# Patient Record
Sex: FEMALE | Race: WHITE | NOT HISPANIC OR LATINO | Employment: OTHER | URBAN - METROPOLITAN AREA
[De-identification: names, ages, dates, MRNs, and addresses within clinical notes are randomized per-mention and may not be internally consistent; named-entity substitution may affect disease eponyms.]

---

## 2017-02-03 ENCOUNTER — OFFICE VISIT (OUTPATIENT)
Dept: FAMILY MEDICINE CLINIC | Facility: CLINIC | Age: 82
End: 2017-02-03

## 2017-02-03 VITALS
OXYGEN SATURATION: 98 % | SYSTOLIC BLOOD PRESSURE: 110 MMHG | BODY MASS INDEX: 26.87 KG/M2 | WEIGHT: 167.2 LBS | DIASTOLIC BLOOD PRESSURE: 68 MMHG | TEMPERATURE: 97.6 F | HEIGHT: 66 IN | HEART RATE: 76 BPM

## 2017-02-03 DIAGNOSIS — S29.019A ACUTE THORACIC MYOFASCIAL STRAIN, INITIAL ENCOUNTER: Primary | ICD-10-CM

## 2017-02-03 PROCEDURE — 99213 OFFICE O/P EST LOW 20 MIN: CPT | Performed by: FAMILY MEDICINE

## 2017-02-03 RX ORDER — ORPHENADRINE CITRATE 100 MG/1
100 TABLET, EXTENDED RELEASE ORAL 2 TIMES DAILY PRN
Qty: 30 TABLET | Refills: 2 | Status: SHIPPED | OUTPATIENT
Start: 2017-02-03 | End: 2017-08-18

## 2017-02-03 NOTE — PROGRESS NOTES
Subjective   Ann Marie Blanton is a 83 y.o. female.     History of Present Illness   Ann Marie Blanton 83 y.o. female who presents today for pain between shoulder blades for 1 week. Denies any history of injury. Denies chest congestion and cough. Pain is aggravated by movement.    she has a history of   Patient Active Problem List   Diagnosis   • Hyperlipidemia   • Depression   • Anxiety   • Osteoarthritis of both knees   • Abrasion of right side of back       The following portions of the patient's history were reviewed and updated as appropriate: allergies, current medications, past family history, past medical history, past social history, past surgical history and problem list.    Review of Systems   Constitutional: Negative for fatigue.   Respiratory: Negative for shortness of breath.    Cardiovascular: Negative for chest pain.   Gastrointestinal: Negative for nausea and vomiting.   Musculoskeletal: Positive for back pain.   Skin: Negative.        Objective   Physical Exam   Constitutional: She appears well-developed and well-nourished.   HENT:   Head: Normocephalic.   Eyes: EOM are normal. Pupils are equal, round, and reactive to light.   Cardiovascular: Normal rate, regular rhythm and normal heart sounds.    Pulmonary/Chest: Effort normal and breath sounds normal.   Musculoskeletal:        Thoracic back: She exhibits tenderness.   Skin: Skin is warm and dry.   Psychiatric: She has a normal mood and affect. Her behavior is normal. Judgment and thought content normal.       Assessment/Plan   Ann Marie was seen today for pain between shoulder blades.    Diagnoses and all orders for this visit:    Acute thoracic myofascial strain, initial encounter  -     orphenadrine (NORFLEX) 100 MG 12 hr tablet; Take 1 tablet by mouth 2 (Two) Times a Day As Needed for muscle spasms.

## 2017-05-02 ENCOUNTER — OFFICE VISIT (OUTPATIENT)
Dept: FAMILY MEDICINE CLINIC | Facility: CLINIC | Age: 82
End: 2017-05-02

## 2017-05-02 VITALS
TEMPERATURE: 97.9 F | HEART RATE: 106 BPM | HEIGHT: 66 IN | DIASTOLIC BLOOD PRESSURE: 70 MMHG | OXYGEN SATURATION: 98 % | RESPIRATION RATE: 16 BRPM | SYSTOLIC BLOOD PRESSURE: 122 MMHG

## 2017-05-02 DIAGNOSIS — L84 CALLUS OF FOOT: Primary | ICD-10-CM

## 2017-05-02 DIAGNOSIS — M79.671 PAIN OF RIGHT HEEL: ICD-10-CM

## 2017-05-02 PROCEDURE — 99213 OFFICE O/P EST LOW 20 MIN: CPT | Performed by: NURSE PRACTITIONER

## 2017-05-02 RX ORDER — VORTIOXETINE 5 MG/1
5 TABLET, FILM COATED ORAL
COMMUNITY
Start: 2017-04-28 | End: 2017-09-14 | Stop reason: SDUPTHER

## 2017-08-18 ENCOUNTER — OFFICE VISIT (OUTPATIENT)
Dept: FAMILY MEDICINE CLINIC | Facility: CLINIC | Age: 82
End: 2017-08-18

## 2017-08-18 VITALS
RESPIRATION RATE: 16 BRPM | HEART RATE: 81 BPM | OXYGEN SATURATION: 98 % | TEMPERATURE: 97.6 F | SYSTOLIC BLOOD PRESSURE: 122 MMHG | BODY MASS INDEX: 23.54 KG/M2 | HEIGHT: 66 IN | DIASTOLIC BLOOD PRESSURE: 95 MMHG | WEIGHT: 146.5 LBS

## 2017-08-18 DIAGNOSIS — G31.84 MILD COGNITIVE IMPAIRMENT: Primary | ICD-10-CM

## 2017-08-18 DIAGNOSIS — Z11.1 PPD SCREENING TEST: ICD-10-CM

## 2017-08-18 PROCEDURE — 99213 OFFICE O/P EST LOW 20 MIN: CPT | Performed by: NURSE PRACTITIONER

## 2017-08-18 PROCEDURE — 86580 TB INTRADERMAL TEST: CPT | Performed by: NURSE PRACTITIONER

## 2017-08-18 NOTE — PROGRESS NOTES
Subjective   Ann Marie Blanton is a 84 y.o. female.     History of Present Illness   Patient presents to office to have paperwork filled out for adult  services. Patient has dementia listed as diagnosis on paperwork but she and daughter deny this.  Daughter states that patient had psych evaluation in the hospital after her   and was not very cooperative with exam.  Evaluation in chart states mild cognitive impairment that is worsened due to anxiety.  Recommended that patient have repeat evaluation in 12 months after anxiety treated.  Patient had neuropsych evaluation after but did not cooperate and complete testing so diagnosis of mild cognitive impairment still stands.  Daughter and patient request referral to have repeat testing. Patient is taking trintellix for anxiety and reports it is working well.  Patient has assistance from her son to manage finances and her daughter fills up her medicine dispenser.  Patient is independent with eating and using the restroom.  She sometimes has assistance bathing.  She has no gait abnormalities but does occasionally have episodes of sciatica.    The following portions of the patient's history were reviewed and updated as appropriate: allergies, current medications, past family history, past medical history, past social history, past surgical history and problem list.    Review of Systems   Constitutional: Negative for fatigue and unexpected weight change.   Respiratory: Negative for shortness of breath.    Cardiovascular: Negative for chest pain and palpitations.   Psychiatric/Behavioral: Negative for agitation, behavioral problems, confusion, decreased concentration, dysphoric mood, hallucinations, self-injury, sleep disturbance and suicidal ideas. The patient is not nervous/anxious and is not hyperactive.        Objective   Physical Exam   Constitutional: She is oriented to person, place, and time. She appears well-developed and well-nourished.    Cardiovascular: Normal rate and regular rhythm.    Pulmonary/Chest: Effort normal and breath sounds normal.   Neurological: She is alert and oriented to person, place, and time.   Psychiatric: She has a normal mood and affect. Her speech is normal and behavior is normal. Judgment and thought content normal. Cognition and memory are normal.   Nursing note and vitals reviewed.      Assessment/Plan   Ann Marie was seen today for paperwork for adult .    Diagnoses and all orders for this visit:    Mild cognitive impairment  -     Ambulatory Referral to Neuropsychology    PPD screening test  -     TB Skin Test    Will refer patient back to neuropsych eval to determine level of cognitive impairment as her anxiety is now well controlled.       Patient will benefit from adult day program.     PPD test placed today. Patient will return Monday to have read. Paperwork will be completed then.

## 2017-08-28 DIAGNOSIS — E78.5 HYPERLIPIDEMIA, UNSPECIFIED HYPERLIPIDEMIA TYPE: ICD-10-CM

## 2017-08-28 RX ORDER — SIMVASTATIN 40 MG
40 TABLET ORAL NIGHTLY
Qty: 30 TABLET | Refills: 5 | Status: ON HOLD | OUTPATIENT
Start: 2017-08-28 | End: 2020-01-01

## 2017-09-14 RX ORDER — VORTIOXETINE 5 MG/1
5 TABLET, FILM COATED ORAL
Qty: 30 TABLET | Refills: 5 | Status: SHIPPED | OUTPATIENT
Start: 2017-09-14 | End: 2018-01-24

## 2018-01-19 ENCOUNTER — TELEPHONE (OUTPATIENT)
Dept: FAMILY MEDICINE CLINIC | Facility: CLINIC | Age: 83
End: 2018-01-19

## 2018-01-19 NOTE — TELEPHONE ENCOUNTER
Pt daughter was called and given info stated, she is aware if the dose increase does not help she is to make a appt for F/U

## 2018-01-19 NOTE — TELEPHONE ENCOUNTER
Pt's daughter called asking if the Trintellix could be increased to twice daily.  The pt was seen and evaluated: her dementia is mild but her anxiety seems to get worse from 3:am until about 7 am.

## 2018-01-24 ENCOUNTER — TELEPHONE (OUTPATIENT)
Dept: FAMILY MEDICINE CLINIC | Facility: CLINIC | Age: 83
End: 2018-01-24

## 2018-01-24 DIAGNOSIS — F33.9 EPISODE OF RECURRENT MAJOR DEPRESSIVE DISORDER, UNSPECIFIED DEPRESSION EPISODE SEVERITY (HCC): Primary | ICD-10-CM

## 2018-01-24 RX ORDER — VORTIOXETINE 5 MG/1
10 TABLET, FILM COATED ORAL
Qty: 30 TABLET | Refills: 5 | Status: SHIPPED | OUTPATIENT
Start: 2018-01-24 | End: 2018-01-24

## 2018-03-01 ENCOUNTER — OFFICE VISIT (OUTPATIENT)
Dept: FAMILY MEDICINE CLINIC | Facility: CLINIC | Age: 83
End: 2018-03-01

## 2018-03-01 VITALS
HEIGHT: 66 IN | WEIGHT: 140 LBS | TEMPERATURE: 98 F | OXYGEN SATURATION: 99 % | HEART RATE: 82 BPM | BODY MASS INDEX: 22.5 KG/M2 | RESPIRATION RATE: 18 BRPM | SYSTOLIC BLOOD PRESSURE: 120 MMHG | DIASTOLIC BLOOD PRESSURE: 69 MMHG

## 2018-03-01 DIAGNOSIS — E78.2 MIXED HYPERLIPIDEMIA: Primary | ICD-10-CM

## 2018-03-01 DIAGNOSIS — G31.84 MILD COGNITIVE IMPAIRMENT: ICD-10-CM

## 2018-03-01 PROCEDURE — 99213 OFFICE O/P EST LOW 20 MIN: CPT | Performed by: NURSE PRACTITIONER

## 2018-03-01 NOTE — PROGRESS NOTES
Subjective   Ann Marie Blanton is a 85 y.o. female.     History of Present Illness   Patient presents to office accompanied by her daughter to have paperwork filled out so she can continue to attend the adult day program.  Daughter states that patient had neuropsych evaluation per Dr. Lorenza Rodriguez.  Do not have results of this.  Daughter states her mother was diagnosed with borderline personality disorder.  Medications were not changed.  She does not have a copy of results but will bring one.      Patient is very paranoid and angry today.  She believes that this visit is so her daughter can place her in a long term care facility.  Discussed with patient that this visit is to continue her day program but she is hesitant to believe this.  Patient is complaint. She reports feeling well and denies any physical complaints.     Patient has not had labs since 2016.    The following portions of the patient's history were reviewed and updated as appropriate: allergies, current medications, past family history, past medical history, past social history, past surgical history and problem list.    Review of Systems   Constitutional: Negative for unexpected weight change.   Respiratory: Negative for shortness of breath.    Cardiovascular: Negative for chest pain and palpitations.   Psychiatric/Behavioral: Positive for agitation, behavioral problems and confusion.       Objective   Physical Exam   Constitutional: She is oriented to person, place, and time. She appears well-developed and well-nourished.   Neck: Carotid bruit is not present.   Cardiovascular: Normal rate and regular rhythm.    Pulmonary/Chest: Effort normal and breath sounds normal.   Neurological: She is alert and oriented to person, place, and time. She has normal strength.   Skin: Skin is warm, dry and intact.   Psychiatric: Her speech is normal. Judgment normal. Her mood appears anxious. She is agitated. She is not aggressive. Thought content is paranoid.   Nursing  note and vitals reviewed.      Assessment/Plan   Ann Marie was seen today for  paper work.    Diagnoses and all orders for this visit:    Mixed hyperlipidemia  -     Comprehensive metabolic panel  -     Lipid panel  -     CBC and Differential  -     TSH    Mild cognitive impairment  -     Ambulatory Referral to Occupational Therapy

## 2018-04-22 DIAGNOSIS — F33.9 EPISODE OF RECURRENT MAJOR DEPRESSIVE DISORDER, UNSPECIFIED DEPRESSION EPISODE SEVERITY (HCC): ICD-10-CM

## 2018-04-23 ENCOUNTER — HOSPITAL ENCOUNTER (OUTPATIENT)
Facility: HOSPITAL | Age: 83
Setting detail: OBSERVATION
Discharge: HOME | End: 2018-04-25
Attending: EMERGENCY MEDICINE | Admitting: HOSPITALIST

## 2018-04-23 ENCOUNTER — APPOINTMENT (OUTPATIENT)
Dept: CT IMAGING | Facility: HOSPITAL | Age: 83
End: 2018-04-23

## 2018-04-23 DIAGNOSIS — R55 SYNCOPE AND COLLAPSE: Primary | ICD-10-CM

## 2018-04-23 DIAGNOSIS — N30.00 ACUTE CYSTITIS WITHOUT HEMATURIA: ICD-10-CM

## 2018-04-23 DIAGNOSIS — R55 VASOVAGAL SYNCOPE: ICD-10-CM

## 2018-04-23 DIAGNOSIS — R31.9 URINARY TRACT INFECTION WITH HEMATURIA, SITE UNSPECIFIED: ICD-10-CM

## 2018-04-23 DIAGNOSIS — N39.0 URINARY TRACT INFECTION WITH HEMATURIA, SITE UNSPECIFIED: ICD-10-CM

## 2018-04-23 DIAGNOSIS — Z74.09 IMPAIRED FUNCTIONAL MOBILITY AND ACTIVITY TOLERANCE: ICD-10-CM

## 2018-04-23 DIAGNOSIS — R01.1 SYSTOLIC MURMUR: ICD-10-CM

## 2018-04-23 PROBLEM — F03.90 DEMENTIA (HCC): Status: ACTIVE | Noted: 2018-04-23

## 2018-04-23 PROBLEM — F32.A DEPRESSION: Status: ACTIVE | Noted: 2018-04-23

## 2018-04-23 LAB
ALBUMIN SERPL-MCNC: 4 G/DL (ref 3.5–5.2)
ALBUMIN/GLOB SERPL: 1.5 G/DL
ALP SERPL-CCNC: 56 U/L (ref 39–117)
ALT SERPL W P-5'-P-CCNC: 11 U/L (ref 1–33)
ANION GAP SERPL CALCULATED.3IONS-SCNC: 13.9 MMOL/L
AST SERPL-CCNC: 16 U/L (ref 1–32)
BACTERIA UR QL AUTO: ABNORMAL /HPF
BASOPHILS # BLD AUTO: 0.03 10*3/MM3 (ref 0–0.2)
BASOPHILS NFR BLD AUTO: 0.4 % (ref 0–1.5)
BILIRUB SERPL-MCNC: 0.6 MG/DL (ref 0.1–1.2)
BILIRUB UR QL STRIP: NEGATIVE
BUN BLD-MCNC: 26 MG/DL (ref 8–23)
BUN/CREAT SERPL: 37.1 (ref 7–25)
CALCIUM SPEC-SCNC: 8.9 MG/DL (ref 8.6–10.5)
CHLORIDE SERPL-SCNC: 101 MMOL/L (ref 98–107)
CLARITY UR: ABNORMAL
CO2 SERPL-SCNC: 27.1 MMOL/L (ref 22–29)
COLOR UR: YELLOW
CREAT BLD-MCNC: 0.7 MG/DL (ref 0.57–1)
DEPRECATED RDW RBC AUTO: 45.3 FL (ref 37–54)
EOSINOPHIL # BLD AUTO: 0.03 10*3/MM3 (ref 0–0.7)
EOSINOPHIL NFR BLD AUTO: 0.4 % (ref 0.3–6.2)
ERYTHROCYTE [DISTWIDTH] IN BLOOD BY AUTOMATED COUNT: 14.8 % (ref 11.7–13)
GFR SERPL CREATININE-BSD FRML MDRD: 80 ML/MIN/1.73
GLOBULIN UR ELPH-MCNC: 2.7 GM/DL
GLUCOSE BLD-MCNC: 101 MG/DL (ref 65–99)
GLUCOSE UR STRIP-MCNC: NEGATIVE MG/DL
HCT VFR BLD AUTO: 45 % (ref 35.6–45.5)
HGB BLD-MCNC: 14.4 G/DL (ref 11.9–15.5)
HGB UR QL STRIP.AUTO: ABNORMAL
HOLD SPECIMEN: NORMAL
HOLD SPECIMEN: NORMAL
HYALINE CASTS UR QL AUTO: ABNORMAL /LPF
IMM GRANULOCYTES # BLD: 0 10*3/MM3 (ref 0–0.03)
IMM GRANULOCYTES NFR BLD: 0 % (ref 0–0.5)
KETONES UR QL STRIP: ABNORMAL
LEUKOCYTE ESTERASE UR QL STRIP.AUTO: ABNORMAL
LIPASE SERPL-CCNC: 39 U/L (ref 13–60)
LYMPHOCYTES # BLD AUTO: 1.2 10*3/MM3 (ref 0.9–4.8)
LYMPHOCYTES NFR BLD AUTO: 17.5 % (ref 19.6–45.3)
MAGNESIUM SERPL-MCNC: 2.3 MG/DL (ref 1.6–2.4)
MCH RBC QN AUTO: 27 PG (ref 26.9–32)
MCHC RBC AUTO-ENTMCNC: 32 G/DL (ref 32.4–36.3)
MCV RBC AUTO: 84.3 FL (ref 80.5–98.2)
MONOCYTES # BLD AUTO: 0.62 10*3/MM3 (ref 0.2–1.2)
MONOCYTES NFR BLD AUTO: 9 % (ref 5–12)
NEUTROPHILS # BLD AUTO: 4.99 10*3/MM3 (ref 1.9–8.1)
NEUTROPHILS NFR BLD AUTO: 72.7 % (ref 42.7–76)
NITRITE UR QL STRIP: POSITIVE
PH UR STRIP.AUTO: 5.5 [PH] (ref 5–8)
PLATELET # BLD AUTO: 213 10*3/MM3 (ref 140–500)
PMV BLD AUTO: 9.6 FL (ref 6–12)
POTASSIUM BLD-SCNC: 3.8 MMOL/L (ref 3.5–5.2)
PROT SERPL-MCNC: 6.7 G/DL (ref 6–8.5)
PROT UR QL STRIP: NEGATIVE
RBC # BLD AUTO: 5.34 10*6/MM3 (ref 3.9–5.2)
RBC # UR: ABNORMAL /HPF
REF LAB TEST METHOD: ABNORMAL
SODIUM BLD-SCNC: 142 MMOL/L (ref 136–145)
SP GR UR STRIP: 1.03 (ref 1–1.03)
SQUAMOUS #/AREA URNS HPF: ABNORMAL /HPF
TROPONIN T SERPL-MCNC: <0.01 NG/ML (ref 0–0.03)
UROBILINOGEN UR QL STRIP: ABNORMAL
WBC NRBC COR # BLD: 6.87 10*3/MM3 (ref 4.5–10.7)
WBC UR QL AUTO: ABNORMAL /HPF
WHOLE BLOOD HOLD SPECIMEN: NORMAL
WHOLE BLOOD HOLD SPECIMEN: NORMAL

## 2018-04-23 PROCEDURE — G0378 HOSPITAL OBSERVATION PER HR: HCPCS

## 2018-04-23 PROCEDURE — 96361 HYDRATE IV INFUSION ADD-ON: CPT

## 2018-04-23 PROCEDURE — 93005 ELECTROCARDIOGRAM TRACING: CPT | Performed by: EMERGENCY MEDICINE

## 2018-04-23 PROCEDURE — 83735 ASSAY OF MAGNESIUM: CPT | Performed by: EMERGENCY MEDICINE

## 2018-04-23 PROCEDURE — 80053 COMPREHEN METABOLIC PANEL: CPT | Performed by: EMERGENCY MEDICINE

## 2018-04-23 PROCEDURE — 81001 URINALYSIS AUTO W/SCOPE: CPT | Performed by: PHYSICIAN ASSISTANT

## 2018-04-23 PROCEDURE — 87186 SC STD MICRODIL/AGAR DIL: CPT | Performed by: PHYSICIAN ASSISTANT

## 2018-04-23 PROCEDURE — 25010000002 CEFTRIAXONE PER 250 MG: Performed by: EMERGENCY MEDICINE

## 2018-04-23 PROCEDURE — 85025 COMPLETE CBC W/AUTO DIFF WBC: CPT

## 2018-04-23 PROCEDURE — 84484 ASSAY OF TROPONIN QUANT: CPT | Performed by: EMERGENCY MEDICINE

## 2018-04-23 PROCEDURE — 99285 EMERGENCY DEPT VISIT HI MDM: CPT

## 2018-04-23 PROCEDURE — 70450 CT HEAD/BRAIN W/O DYE: CPT

## 2018-04-23 PROCEDURE — 83690 ASSAY OF LIPASE: CPT | Performed by: PHYSICIAN ASSISTANT

## 2018-04-23 PROCEDURE — 93010 ELECTROCARDIOGRAM REPORT: CPT | Performed by: INTERNAL MEDICINE

## 2018-04-23 PROCEDURE — 87086 URINE CULTURE/COLONY COUNT: CPT | Performed by: PHYSICIAN ASSISTANT

## 2018-04-23 PROCEDURE — 96365 THER/PROPH/DIAG IV INF INIT: CPT

## 2018-04-23 PROCEDURE — 74176 CT ABD & PELVIS W/O CONTRAST: CPT

## 2018-04-23 RX ORDER — SODIUM CHLORIDE 0.9 % (FLUSH) 0.9 %
10 SYRINGE (ML) INJECTION AS NEEDED
Status: DISCONTINUED | OUTPATIENT
Start: 2018-04-23 | End: 2018-04-25 | Stop reason: HOSPADM

## 2018-04-23 RX ORDER — ONDANSETRON 2 MG/ML
4 INJECTION INTRAMUSCULAR; INTRAVENOUS EVERY 6 HOURS PRN
Status: DISCONTINUED | OUTPATIENT
Start: 2018-04-23 | End: 2018-04-25 | Stop reason: HOSPADM

## 2018-04-23 RX ORDER — ASPIRIN 81 MG/1
81 TABLET ORAL DAILY
COMMUNITY

## 2018-04-23 RX ORDER — ASPIRIN 81 MG/1
81 TABLET ORAL DAILY
Status: DISCONTINUED | OUTPATIENT
Start: 2018-04-24 | End: 2018-04-25 | Stop reason: HOSPADM

## 2018-04-23 RX ORDER — ONDANSETRON 4 MG/1
4 TABLET, FILM COATED ORAL EVERY 6 HOURS PRN
Status: DISCONTINUED | OUTPATIENT
Start: 2018-04-23 | End: 2018-04-25 | Stop reason: HOSPADM

## 2018-04-23 RX ORDER — ATORVASTATIN CALCIUM 20 MG/1
20 TABLET, FILM COATED ORAL DAILY
Status: DISCONTINUED | OUTPATIENT
Start: 2018-04-24 | End: 2018-04-25 | Stop reason: HOSPADM

## 2018-04-23 RX ORDER — SODIUM CHLORIDE 0.9 % (FLUSH) 0.9 %
1-10 SYRINGE (ML) INJECTION AS NEEDED
Status: DISCONTINUED | OUTPATIENT
Start: 2018-04-23 | End: 2018-04-25 | Stop reason: HOSPADM

## 2018-04-23 RX ORDER — VORTIOXETINE 10 MG/1
TABLET, FILM COATED ORAL
Qty: 30 TABLET | Refills: 1 | Status: SHIPPED | OUTPATIENT
Start: 2018-04-23 | End: 2018-04-23

## 2018-04-23 RX ORDER — ASPIRIN 81 MG/1
81 TABLET, CHEWABLE ORAL DAILY
COMMUNITY
End: 2018-04-23

## 2018-04-23 RX ORDER — ACETAMINOPHEN 325 MG/1
650 TABLET ORAL EVERY 4 HOURS PRN
Status: DISCONTINUED | OUTPATIENT
Start: 2018-04-23 | End: 2018-04-25 | Stop reason: HOSPADM

## 2018-04-23 RX ORDER — CEFTRIAXONE SODIUM 1 G/50ML
1 INJECTION, SOLUTION INTRAVENOUS ONCE
Status: COMPLETED | OUTPATIENT
Start: 2018-04-23 | End: 2018-04-23

## 2018-04-23 RX ORDER — ONDANSETRON 4 MG/1
4 TABLET, ORALLY DISINTEGRATING ORAL EVERY 6 HOURS PRN
Status: DISCONTINUED | OUTPATIENT
Start: 2018-04-23 | End: 2018-04-25 | Stop reason: HOSPADM

## 2018-04-23 RX ADMIN — SODIUM CHLORIDE 500 ML: 9 INJECTION, SOLUTION INTRAVENOUS at 12:47

## 2018-04-23 RX ADMIN — CEFTRIAXONE SODIUM 1 G: 1 INJECTION, SOLUTION INTRAVENOUS at 14:00

## 2018-04-23 NOTE — ED TRIAGE NOTES
Pt was at adult day center when she became pale and diaphoretic and dropped to her knees. Pt was placed in chair and symptoms resolved. Denies chest pain, soa, injury from fall. Denies any complaints but does not remember episode

## 2018-04-23 NOTE — ED PROVIDER NOTES
"EMERGENCY DEPARTMENT ENCOUNTER    Room Number:  38/38  Date seen:  4/23/2018  Time seen: 11:57 AM  PCP: PRANEETH Toth    HPI:  Chief complaint: syncope  Context:Ann Marie Blanton is a 85 y.o. female with a history of dementia who presents from adult day care via EMS after becoming \"pale, diaphoretic, and dropping to her knees\", per triage note. Daughter was told pt had been hysterical all morning just prior to this event. Pt does not remember any of these events and denies any abnormal feelings prior. She denies abd pain, N/V/D,, SOA, CP, cough, congestion, or urinary symptoms. Daughter reports pt has been having increased low back pain for a few days, increased confusion x 1 week, and nausea yesterday. Daughter says pt did not eat yesterday but had breakfast this AM and day center was concerned about a UTI.    Onset: gradual  Duration: unknown  Timing: resolved  Character: syncope, per triage note  Aggravating Factors: none  Alleviating Factors: none  Severity: moderate    ALLERGIES  Sulfa antibiotics    PAST MEDICAL HISTORY  Active Ambulatory Problems     Diagnosis Date Noted   • Hyperlipidemia 10/28/2015   • Depression 10/28/2015   • Anxiety 10/28/2015   • Osteoarthritis of both knees 10/28/2015   • Abrasion of right side of back 08/23/2016   • ERRONEOUS ENCOUNTER--DISREGARD 05/16/2017     Resolved Ambulatory Problems     Diagnosis Date Noted   • No Resolved Ambulatory Problems     Past Medical History:   Diagnosis Date   • Anxiety    • Depression    • Eustachian tube dysfunction    • Eye exam normal 2013   • Hyperlipidemia    • Hypokalemia    • Mild cognitive impairment    • Sciatica    • Seasonal allergies        PAST SURGICAL HISTORY  Past Surgical History:   Procedure Laterality Date   • APPENDECTOMY     • EYE SURGERY Left 06/01/2015    Dr. Saldnaa   • HYSTERECTOMY     • KNEE SURGERY  2010    torn meniscus   • TONSILLECTOMY         FAMILY HISTORY  Family History   Problem Relation Age of Onset   • " Kidney disease Mother    • Nephrolithiasis Mother    • Diabetes Mother    • Heart disease Father    • Diabetes Other    • Breast cancer Sister    • Thyroid disease Brother        SOCIAL HISTORY  Social History     Social History   • Marital status:      Spouse name: N/A   • Number of children: N/A   • Years of education: N/A     Occupational History   • Not on file.     Social History Main Topics   • Smoking status: Never Smoker   • Smokeless tobacco: Never Used   • Alcohol use No   • Drug use: No   • Sexual activity: Not on file     Other Topics Concern   • Not on file     Social History Narrative   • No narrative on file       REVIEW OF SYSTEMS  Review of Systems   Unable to perform ROS: Dementia (pt does not remember events)   Constitutional: Positive for diaphoresis. Negative for chills and fever.   HENT: Negative for congestion.    Eyes: Negative.    Respiratory: Negative for cough and shortness of breath.    Cardiovascular: Negative for chest pain.   Gastrointestinal: Positive for nausea (per daughter). Negative for abdominal pain, diarrhea and vomiting.   Genitourinary: Negative.  Negative for difficulty urinating.   Musculoskeletal: Positive for back pain (per daughter, chronic).   Skin: Positive for pallor.   Neurological: Positive for syncope. Negative for headaches.   Psychiatric/Behavioral: Positive for confusion (per daughter).       PHYSICAL EXAM  ED Triage Vitals [04/23/18 1115]   Temp Heart Rate Resp BP SpO2   97.2 °F (36.2 °C) 60 16 130/65 98 %      Temp src Heart Rate Source Patient Position BP Location FiO2 (%)   Oral Monitor Lying -- --     Physical Exam   Constitutional: She is well-developed, well-nourished, and in no distress.   HENT:   Head: Normocephalic and atraumatic.   Right Ear: External ear normal.   Left Ear: External ear normal.   Nose: Nose normal.   Moist MM  Midline uvula   Eyes: Conjunctivae are normal.   Neck: Normal range of motion.   Cardiovascular: Normal rate and  regular rhythm.    Murmur heard.   Systolic murmur is present   Pulmonary/Chest: Effort normal and breath sounds normal.   Abdominal: Soft. Bowel sounds are normal. She exhibits no distension. There is no tenderness.   Musculoskeletal: Normal range of motion.   No lower extremity edema   Neurological: She is alert.   Skin: Skin is warm and dry.   Psychiatric: Affect normal.   Nursing note and vitals reviewed.      LAB RESULTS  Recent Results (from the past 24 hour(s))   Comprehensive Metabolic Panel    Collection Time: 04/23/18 11:31 AM   Result Value Ref Range    Glucose 101 (H) 65 - 99 mg/dL    BUN 26 (H) 8 - 23 mg/dL    Creatinine 0.70 0.57 - 1.00 mg/dL    Sodium 142 136 - 145 mmol/L    Potassium 3.8 3.5 - 5.2 mmol/L    Chloride 101 98 - 107 mmol/L    CO2 27.1 22.0 - 29.0 mmol/L    Calcium 8.9 8.6 - 10.5 mg/dL    Total Protein 6.7 6.0 - 8.5 g/dL    Albumin 4.00 3.50 - 5.20 g/dL    ALT (SGPT) 11 1 - 33 U/L    AST (SGOT) 16 1 - 32 U/L    Alkaline Phosphatase 56 39 - 117 U/L    Total Bilirubin 0.6 0.1 - 1.2 mg/dL    eGFR Non African Amer 80 >60 mL/min/1.73    Globulin 2.7 gm/dL    A/G Ratio 1.5 g/dL    BUN/Creatinine Ratio 37.1 (H) 7.0 - 25.0    Anion Gap 13.9 mmol/L   Magnesium    Collection Time: 04/23/18 11:31 AM   Result Value Ref Range    Magnesium 2.3 1.6 - 2.4 mg/dL   Troponin    Collection Time: 04/23/18 11:31 AM   Result Value Ref Range    Troponin T <0.010 0.000 - 0.030 ng/mL   Light Blue Top    Collection Time: 04/23/18 11:31 AM   Result Value Ref Range    Extra Tube hold for add-on    Green Top (Gel)    Collection Time: 04/23/18 11:31 AM   Result Value Ref Range    Extra Tube Hold for add-ons.    Lavender Top    Collection Time: 04/23/18 11:31 AM   Result Value Ref Range    Extra Tube hold for add-on    Gold Top - SST    Collection Time: 04/23/18 11:31 AM   Result Value Ref Range    Extra Tube Hold for add-ons.    CBC Auto Differential    Collection Time: 04/23/18 11:31 AM   Result Value Ref Range    WBC  6.87 4.50 - 10.70 10*3/mm3    RBC 5.34 (H) 3.90 - 5.20 10*6/mm3    Hemoglobin 14.4 11.9 - 15.5 g/dL    Hematocrit 45.0 35.6 - 45.5 %    MCV 84.3 80.5 - 98.2 fL    MCH 27.0 26.9 - 32.0 pg    MCHC 32.0 (L) 32.4 - 36.3 g/dL    RDW 14.8 (H) 11.7 - 13.0 %    RDW-SD 45.3 37.0 - 54.0 fl    MPV 9.6 6.0 - 12.0 fL    Platelets 213 140 - 500 10*3/mm3    Neutrophil % 72.7 42.7 - 76.0 %    Lymphocyte % 17.5 (L) 19.6 - 45.3 %    Monocyte % 9.0 5.0 - 12.0 %    Eosinophil % 0.4 0.3 - 6.2 %    Basophil % 0.4 0.0 - 1.5 %    Immature Grans % 0.0 0.0 - 0.5 %    Neutrophils, Absolute 4.99 1.90 - 8.10 10*3/mm3    Lymphocytes, Absolute 1.20 0.90 - 4.80 10*3/mm3    Monocytes, Absolute 0.62 0.20 - 1.20 10*3/mm3    Eosinophils, Absolute 0.03 0.00 - 0.70 10*3/mm3    Basophils, Absolute 0.03 0.00 - 0.20 10*3/mm3    Immature Grans, Absolute 0.00 0.00 - 0.03 10*3/mm3   Lipase    Collection Time: 04/23/18 11:31 AM   Result Value Ref Range    Lipase 39 13 - 60 U/L   Urinalysis With / Culture If Indicated - Urine, Catheter    Collection Time: 04/23/18 12:44 PM   Result Value Ref Range    Color, UA Yellow Yellow, Straw    Appearance, UA Cloudy (A) Clear    pH, UA 5.5 5.0 - 8.0    Specific Gravity, UA 1.029 1.005 - 1.030    Glucose, UA Negative Negative    Ketones, UA 40 mg/dL (2+) (A) Negative    Bilirubin, UA Negative Negative    Blood, UA Large (3+) (A) Negative    Protein, UA Negative Negative    Leuk Esterase, UA Trace (A) Negative    Nitrite, UA Positive (A) Negative    Urobilinogen, UA 0.2 E.U./dL 0.2 - 1.0 E.U./dL   Urinalysis, Microscopic Only - Urine, Clean Catch    Collection Time: 04/23/18 12:44 PM   Result Value Ref Range    RBC, UA 6-12 (A) None Seen, 0-2 /HPF    WBC, UA 3-5 (A) None Seen, 0-2 /HPF    Bacteria, UA 3+ (A) None Seen /HPF    Squamous Epithelial Cells, UA 7-12 (A) None Seen, 0-2 /HPF    Hyaline Casts, UA 3-6 None Seen /LPF    Methodology Manual Light Microscopy        I ordered the above labs and reviewed the  results    RADIOLOGY  CT Head Without Contrast   Preliminary Result   There is mild small vessel disease in the cerebral white   matter and mild cerebral atrophy most pronounced in the temporal lobes.   The remainder of the head CT is unremarkable.        The results were communicated to Dr. Edmond in the emergency room by   telephone 04/23/2018 at 2:25 PM.       Radiation dose reduction techniques were utilized, including automated   exposure control and exposure modulation based on body size.              CT Abdomen Pelvis Without Contrast   Preliminary Result   No acute abnormality is seen.       Discussed with Dr. Edmond.              I ordered the above noted radiological studies and reviewed the images on the PACS system.    MEDICATIONS GIVEN IN ER  Medications   sodium chloride 0.9 % flush 10 mL (not administered)   sodium chloride 0.9 % bolus 500 mL (0 mL Intravenous Stopped 4/23/18 1400)   cefTRIAXone (ROCEPHIN) IVPB 1 g (0 g Intravenous Stopped 4/23/18 1431)       EKG  Interpreted by ED Physician, Dr. Edmond    PROCEDURES  Procedures      PROGRESS AND CONSULTS    Progress Notes:    ED Course   1234  I spoke with Lynne [director] at 's adult day center concerning the events of her syncopal episode. She reports being a witness and states pt was emotional and tearful all morning. She had c/o back pain, which is chronic was given ASA, which is the established protocol for her there when she has back pain. Pt also c/o abdominal pain. Director went to bathroom with pt 3 times for nausea, but there was no vomiting. Pt had 2 bites of breakfast and was going to play a game. Pt was sitting down and stood up [as part of the game], reported she was dizzy, then fell to her knees. Pt was caught and helped into a chair. Pt improved within a couple minutes. She stated just after helping her from the ground, she noticed the pt was pale and diaphoretic.    1245  Reviewed pt's history and workup with   "Feli.  After a bedside evaluation; Dr Edmond agrees with the plan of care    1502  CT head results were called to Dr. Edmond, which were negative acute.    1524  Discussed pt's case, labs, imaging, and EKG results with Dr. Whitt ([facility]) who will admit pt to telemetry.    1554  Rechecked pt and notified pt and daughter of all lab, imaging, and EKG results which show some mild dehydration and possible infection to urine, which pt was given rocephin for. Discussed plan to admit pt for potential new heart murmur and syncopal episode. Pt and daughter understands and agrees with the plan, all questions answered.      Disposition vitals:  /74   Pulse 63   Temp 97.2 °F (36.2 °C) (Oral)   Resp 18   Ht 165.1 cm (65\")   Wt 63.5 kg (140 lb)   LMP  (LMP Unknown)   SpO2 98%   BMI 23.30 kg/m²       DIAGNOSIS  Final diagnoses:   Syncope and collapse   Urinary tract infection with hematuria, site unspecified   Systolic murmur       DISPOSITION  ADMISSION    Discussed treatment plan and reason for admission with pt/family and admitting physician.  Pt/family voiced understanding of the plan for admission for further testing/treatment as needed.       Documentation assistance provided by prakash Bettencourt for Noemi Sotelo PA-C.  Information recorded by the prakash was done at my direction and has been verified and validated by me.     Heaven Bettencourt  04/23/18 2307       ANKITA Cordoba  04/23/18 2033    "

## 2018-04-23 NOTE — PROGRESS NOTES
Clinical Pharmacy Services: Medication History    Ann Marie Blanton is a 85 y.o. female presenting to [facility] for   Chief Complaint   Patient presents with   • Syncope       She  has a past medical history of Anxiety; Depression; Eustachian tube dysfunction; Eye exam normal (2013); Hyperlipidemia; Hypokalemia; Mild cognitive impairment; Sciatica; and Seasonal allergies.    Allergies as of 04/23/2018 - Reviewed 04/23/2018   Allergen Reaction Noted   • Sulfa antibiotics Other (See Comments) 10/28/2015       Medication information was obtained from: Family member, pharmacy  Pharmacy and Phone Number: [facility] [phone]    Prior to Admission Medications     Prescriptions Last Dose Informant Patient Reported? Taking?    aspirin 81 MG EC tablet  Family Member Yes Yes    Take 81 mg by mouth Daily.    simvastatin (ZOCOR) 40 MG tablet  Family Member No Yes    Take 1 tablet by mouth Every Night.    Vortioxetine HBr (TRINTELLIX) 10 MG tablet  Pharmacy Yes Yes    Take 10 mg by mouth Daily.            Medication notes: Per patient's family member, patient does not take Simvastatin consistently.     This medication list is complete to the best of my knowledge as of 4/23/2018    Please call if questions.    Mirian Alvarez, Medication History Technician  4/23/2018 3:51 PM

## 2018-04-23 NOTE — PLAN OF CARE
Problem: Patient Care Overview  Goal: Plan of Care Review  Outcome: Ongoing (interventions implemented as appropriate)   04/23/18 9828   Coping/Psychosocial   Plan of Care Reviewed With patient   Plan of Care Review   Progress no change   OTHER   Outcome Summary PT admitted today for syncope. VSS. A&Ox4. No falls. bedside swallow passed. NIH=0. will monitor.       Problem: Fall Risk (Adult)  Goal: Identify Related Risk Factors and Signs and Symptoms  Outcome: Outcome(s) achieved Date Met: 04/23/18    Goal: Absence of Fall  Outcome: Ongoing (interventions implemented as appropriate)

## 2018-04-23 NOTE — ED PROVIDER NOTES
Pt has hx of depression or dementia.  Pt presents to the ED after a syncopal episode PTA which was monitored and controlled by her  center.  Daughter states an employee was holding her arm while the pt became pale, diaphoretic, dizzy, and then dropped down.  The employee caught the pt, so she did not hit the ground.   Pt does not remember the event but denies any current CP or SOA. Daughter reports the pt has not been eating or drinking well the past 2 days and had a hysterical episode this morning prior to the syncopal episode.    On exam, pt is elderly, is in NAD and has no signs of trauma or head injury.  Pt's heart has a 2/6 soft systolic murmur.  Pt's abd is soft and nontender.  I ambulated the pt.  On neuro exam, pt has no focal weakness and a normal gait  Pt's psych is pleasant.      EKG           EKG time: 1157  Rhythm/Rate: NSR, 66  P waves and TN: IVCD  QRS, axis: normal axis, normal QT  ST and T waves: diffuse nonspecific ST and T wave changes  No signs of WPW, Brugada syndrome      Interpreted Contemporaneously by me, independently viewed  Unchanged compared to prior 2016.    Reviewed pt's labs, pt has a UTI.  Plan to perform CT Head and CT Abd/Pelvis for further evaluation.  Most likely will admit the pt.  I spoke with the radiologist, Dr. Ocampo and Dr. Hampton, concerning the results of the CT scan of the head and abdomen and pelvis respectively and they show revealed no acute process.    MD ATTESTATION NOTE    The MOHIT and I have discussed this patient's history, physical exam, and treatment plan.  I have reviewed the documentation and personally had a face to face interaction with the patient. I affirm the documentation and agree with the treatment and plan.  The attached note describes my personal findings.      Documentation assistance provided by prakash Reese for Dr. Edmond. Information recorded by the prakash was done at my direction and has been verified and validated by me.      Yvonne Reese  04/23/18 133       Adolfo Edmond MD  04/23/18 8213

## 2018-04-24 ENCOUNTER — APPOINTMENT (OUTPATIENT)
Dept: CARDIOLOGY | Facility: HOSPITAL | Age: 83
End: 2018-04-24
Attending: HOSPITALIST

## 2018-04-24 ENCOUNTER — APPOINTMENT (OUTPATIENT)
Dept: CARDIOLOGY | Facility: HOSPITAL | Age: 83
End: 2018-04-24
Attending: INTERNAL MEDICINE

## 2018-04-24 PROBLEM — N30.00 ACUTE CYSTITIS WITHOUT HEMATURIA: Status: ACTIVE | Noted: 2018-04-24

## 2018-04-24 PROBLEM — Z66 DNR (DO NOT RESUSCITATE): Status: ACTIVE | Noted: 2018-04-24

## 2018-04-24 LAB
ANION GAP SERPL CALCULATED.3IONS-SCNC: 11.9 MMOL/L
BH CV ECHO MEAS - ACS: 1.7 CM
BH CV ECHO MEAS - AO MEAN PG (FULL): 5 MMHG
BH CV ECHO MEAS - AO MEAN PG: 7 MMHG
BH CV ECHO MEAS - AO ROOT AREA: 5.7 CM^2
BH CV ECHO MEAS - AO ROOT DIAM: 2.7 CM
BH CV ECHO MEAS - AO V2 MEAN: 117 CM/SEC
BH CV ECHO MEAS - AO V2 VTI: 42.9 CM
BH CV ECHO MEAS - AVA(I,A): 1.3 CM^2
BH CV ECHO MEAS - AVA(I,D): 1.3 CM^2
BH CV ECHO MEAS - BZI_METRIC_HEIGHT: 167.6 CM
BH CV ECHO MEAS - CO(CUBED): 2.6 L/MIN
BH CV ECHO MEAS - CO(MOD-SP2): 2 L/MIN
BH CV ECHO MEAS - CO(MOD-SP4): 2.7 L/MIN
BH CV ECHO MEAS - CO(TEICH): 2.5 L/MIN
BH CV ECHO MEAS - CONTRAST EF (2CH): 55.4 ML/M^2
BH CV ECHO MEAS - CONTRAST EF 4CH: 57.7 ML/M^2
BH CV ECHO MEAS - EDV(MOD-SP2): 56 ML
BH CV ECHO MEAS - EDV(MOD-SP4): 71 ML
BH CV ECHO MEAS - EDV(TEICH): 70 ML
BH CV ECHO MEAS - EF(CUBED): 61.9 %
BH CV ECHO MEAS - EF(MOD-BP): 57 %
BH CV ECHO MEAS - EF(MOD-SP2): 55.4 %
BH CV ECHO MEAS - EF(MOD-SP4): 57.7 %
BH CV ECHO MEAS - EF(TEICH): 54 %
BH CV ECHO MEAS - ESV(MOD-SP2): 25 ML
BH CV ECHO MEAS - ESV(MOD-SP4): 30 ML
BH CV ECHO MEAS - ESV(TEICH): 32.2 ML
BH CV ECHO MEAS - FS: 27.5 %
BH CV ECHO MEAS - IVS/LVPW: 1
BH CV ECHO MEAS - IVSD: 1.2 CM
BH CV ECHO MEAS - LAT PEAK E' VEL: 5 CM/SEC
BH CV ECHO MEAS - LV MASS(C)D: 165.5 GRAMS
BH CV ECHO MEAS - LV MEAN PG: 2 MMHG
BH CV ECHO MEAS - LV V1 MEAN: 63.4 CM/SEC
BH CV ECHO MEAS - LV V1 VTI: 22.5 CM
BH CV ECHO MEAS - LVIDD: 4 CM
BH CV ECHO MEAS - LVIDS: 2.9 CM
BH CV ECHO MEAS - LVLD AP2: 6.4 CM
BH CV ECHO MEAS - LVLD AP4: 6.1 CM
BH CV ECHO MEAS - LVLS AP2: 5.4 CM
BH CV ECHO MEAS - LVLS AP4: 5.4 CM
BH CV ECHO MEAS - LVOT AREA (M): 2.5 CM^2
BH CV ECHO MEAS - LVOT AREA: 2.5 CM^2
BH CV ECHO MEAS - LVOT DIAM: 1.8 CM
BH CV ECHO MEAS - LVPWD: 1.2 CM
BH CV ECHO MEAS - MED PEAK E' VEL: 5 CM/SEC
BH CV ECHO MEAS - MM HR: 66 BPM
BH CV ECHO MEAS - MM R-R INT: 0.91 SEC
BH CV ECHO MEAS - MV A DUR: 0.1 SEC
BH CV ECHO MEAS - MV A MAX VEL: 115 CM/SEC
BH CV ECHO MEAS - MV DEC SLOPE: 376 CM/SEC^2
BH CV ECHO MEAS - MV DEC TIME: 0.23 SEC
BH CV ECHO MEAS - MV E MAX VEL: 84.5 CM/SEC
BH CV ECHO MEAS - MV E/A: 0.73
BH CV ECHO MEAS - MV MEAN PG: 3 MMHG
BH CV ECHO MEAS - MV P1/2T MAX VEL: 87.7 CM/SEC
BH CV ECHO MEAS - MV P1/2T: 68.3 MSEC
BH CV ECHO MEAS - MV V2 MEAN: 74.9 CM/SEC
BH CV ECHO MEAS - MV V2 VTI: 37.7 CM
BH CV ECHO MEAS - MVA P1/2T LCG: 2.5 CM^2
BH CV ECHO MEAS - MVA(P1/2T): 3.2 CM^2
BH CV ECHO MEAS - MVA(VTI): 1.5 CM^2
BH CV ECHO MEAS - PULM A REVS DUR: 0.13 SEC
BH CV ECHO MEAS - PULM A REVS VEL: 43.3 CM/SEC
BH CV ECHO MEAS - PULM DIAS VEL: 45 CM/SEC
BH CV ECHO MEAS - PULM S/D: 1.4
BH CV ECHO MEAS - PULM SYS VEL: 64.7 CM/SEC
BH CV ECHO MEAS - RAP SYSTOLE: 3 MMHG
BH CV ECHO MEAS - RVSP: 23 MMHG
BH CV ECHO MEAS - SUP REN AO DIAM: 2 CM
BH CV ECHO MEAS - SV(AO): 245.6 ML
BH CV ECHO MEAS - SV(CUBED): 39.6 ML
BH CV ECHO MEAS - SV(LVOT): 57.3 ML
BH CV ECHO MEAS - SV(MOD-SP2): 31 ML
BH CV ECHO MEAS - SV(MOD-SP4): 41 ML
BH CV ECHO MEAS - SV(TEICH): 37.8 ML
BH CV ECHO MEAS - TAPSE (>1.6): 1.8 CM2
BH CV ECHO MEAS - TR MAX VEL: 225 CM/SEC
BH CV ECHO MEASUREMENTS AVERAGE E/E' RATIO: 16.9
BH CV XLRA - RV BASE: 2.5 CM
BH CV XLRA - TDI S': 13 CM/SEC
BH CV XLRA MEAS LEFT DIST CCA EDV: 10.6 CM/SEC
BH CV XLRA MEAS LEFT DIST CCA PSV: 50.3 CM/SEC
BH CV XLRA MEAS LEFT DIST ICA EDV: -23.8 CM/SEC
BH CV XLRA MEAS LEFT DIST ICA PSV: -74.1 CM/SEC
BH CV XLRA MEAS LEFT MID ICA EDV: -26.9 CM/SEC
BH CV XLRA MEAS LEFT MID ICA PSV: -79.8 CM/SEC
BH CV XLRA MEAS LEFT PROX CCA EDV: 14.1 CM/SEC
BH CV XLRA MEAS LEFT PROX CCA PSV: 86.2 CM/SEC
BH CV XLRA MEAS LEFT PROX ECA EDV: -7.2 CM/SEC
BH CV XLRA MEAS LEFT PROX ECA PSV: -90.2 CM/SEC
BH CV XLRA MEAS LEFT PROX ICA EDV: 16.1 CM/SEC
BH CV XLRA MEAS LEFT PROX ICA PSV: 48.7 CM/SEC
BH CV XLRA MEAS LEFT PROX SCLA PSV: 106 CM/SEC
BH CV XLRA MEAS LEFT VERTEBRAL A EDV: 11.5 CM/SEC
BH CV XLRA MEAS LEFT VERTEBRAL A PSV: 46 CM/SEC
BH CV XLRA MEAS RIGHT DIST CCA EDV: 12.2 CM/SEC
BH CV XLRA MEAS RIGHT DIST CCA PSV: 51.1 CM/SEC
BH CV XLRA MEAS RIGHT DIST ICA EDV: -17.1 CM/SEC
BH CV XLRA MEAS RIGHT DIST ICA PSV: -70.9 CM/SEC
BH CV XLRA MEAS RIGHT MID ICA EDV: -24.4 CM/SEC
BH CV XLRA MEAS RIGHT MID ICA PSV: -69.5 CM/SEC
BH CV XLRA MEAS RIGHT PROX CCA EDV: 10.2 CM/SEC
BH CV XLRA MEAS RIGHT PROX CCA PSV: 62.9 CM/SEC
BH CV XLRA MEAS RIGHT PROX ECA EDV: -6.7 CM/SEC
BH CV XLRA MEAS RIGHT PROX ECA PSV: -61.8 CM/SEC
BH CV XLRA MEAS RIGHT PROX ICA EDV: -18.9 CM/SEC
BH CV XLRA MEAS RIGHT PROX ICA PSV: -57.8 CM/SEC
BH CV XLRA MEAS RIGHT PROX SCLA PSV: 77.4 CM/SEC
BH CV XLRA MEAS RIGHT VERTEBRAL A EDV: -5.5 CM/SEC
BH CV XLRA MEAS RIGHT VERTEBRAL A PSV: -36.9 CM/SEC
BUN BLD-MCNC: 17 MG/DL (ref 8–23)
BUN/CREAT SERPL: 23.6 (ref 7–25)
CALCIUM SPEC-SCNC: 8.7 MG/DL (ref 8.6–10.5)
CHLORIDE SERPL-SCNC: 105 MMOL/L (ref 98–107)
CO2 SERPL-SCNC: 26.1 MMOL/L (ref 22–29)
CREAT BLD-MCNC: 0.72 MG/DL (ref 0.57–1)
DEPRECATED RDW RBC AUTO: 45.3 FL (ref 37–54)
ERYTHROCYTE [DISTWIDTH] IN BLOOD BY AUTOMATED COUNT: 14.7 % (ref 11.7–13)
GFR SERPL CREATININE-BSD FRML MDRD: 77 ML/MIN/1.73
GLUCOSE BLD-MCNC: 106 MG/DL (ref 65–99)
HCT VFR BLD AUTO: 44.3 % (ref 35.6–45.5)
HGB BLD-MCNC: 13.8 G/DL (ref 11.9–15.5)
LEFT ARM BP: NORMAL MMHG
LEFT ATRIUM VOLUME INDEX: 21 ML/M2
LEFT ATRIUM VOLUME: 36 CM3
LV EF 2D ECHO EST: 57 %
MCH RBC QN AUTO: 26.4 PG (ref 26.9–32)
MCHC RBC AUTO-ENTMCNC: 31.2 G/DL (ref 32.4–36.3)
MCV RBC AUTO: 84.7 FL (ref 80.5–98.2)
PLATELET # BLD AUTO: 207 10*3/MM3 (ref 140–500)
PMV BLD AUTO: 9.4 FL (ref 6–12)
POTASSIUM BLD-SCNC: 3.7 MMOL/L (ref 3.5–5.2)
RBC # BLD AUTO: 5.23 10*6/MM3 (ref 3.9–5.2)
RIGHT ARM BP: NORMAL MMHG
SODIUM BLD-SCNC: 143 MMOL/L (ref 136–145)
TSH SERPL DL<=0.05 MIU/L-ACNC: 1.95 MIU/ML (ref 0.27–4.2)
VIT B12 BLD-MCNC: 322 PG/ML (ref 211–946)
WBC NRBC COR # BLD: 6.52 10*3/MM3 (ref 4.5–10.7)

## 2018-04-24 PROCEDURE — G0378 HOSPITAL OBSERVATION PER HR: HCPCS

## 2018-04-24 PROCEDURE — 96366 THER/PROPH/DIAG IV INF ADDON: CPT

## 2018-04-24 PROCEDURE — 25010000002 CEFTRIAXONE PER 250 MG: Performed by: INTERNAL MEDICINE

## 2018-04-24 PROCEDURE — 93880 EXTRACRANIAL BILAT STUDY: CPT

## 2018-04-24 PROCEDURE — G8980 MOBILITY D/C STATUS: HCPCS

## 2018-04-24 PROCEDURE — 93306 TTE W/DOPPLER COMPLETE: CPT | Performed by: INTERNAL MEDICINE

## 2018-04-24 PROCEDURE — 97110 THERAPEUTIC EXERCISES: CPT

## 2018-04-24 PROCEDURE — 97161 PT EVAL LOW COMPLEX 20 MIN: CPT

## 2018-04-24 PROCEDURE — 84443 ASSAY THYROID STIM HORMONE: CPT | Performed by: HOSPITALIST

## 2018-04-24 PROCEDURE — 85027 COMPLETE CBC AUTOMATED: CPT | Performed by: HOSPITALIST

## 2018-04-24 PROCEDURE — 99204 OFFICE O/P NEW MOD 45 MIN: CPT | Performed by: INTERNAL MEDICINE

## 2018-04-24 PROCEDURE — G8979 MOBILITY GOAL STATUS: HCPCS

## 2018-04-24 PROCEDURE — G8978 MOBILITY CURRENT STATUS: HCPCS

## 2018-04-24 PROCEDURE — 93306 TTE W/DOPPLER COMPLETE: CPT

## 2018-04-24 PROCEDURE — 96361 HYDRATE IV INFUSION ADD-ON: CPT

## 2018-04-24 PROCEDURE — 80048 BASIC METABOLIC PNL TOTAL CA: CPT | Performed by: HOSPITALIST

## 2018-04-24 PROCEDURE — 82607 VITAMIN B-12: CPT | Performed by: INTERNAL MEDICINE

## 2018-04-24 RX ORDER — CEFTRIAXONE SODIUM 1 G/50ML
1 INJECTION, SOLUTION INTRAVENOUS EVERY 24 HOURS
Status: DISCONTINUED | OUTPATIENT
Start: 2018-04-24 | End: 2018-04-25

## 2018-04-24 RX ORDER — SODIUM CHLORIDE 9 MG/ML
100 INJECTION, SOLUTION INTRAVENOUS CONTINUOUS
Status: DISCONTINUED | OUTPATIENT
Start: 2018-04-24 | End: 2018-04-25 | Stop reason: HOSPADM

## 2018-04-24 RX ADMIN — CEFTRIAXONE SODIUM 1 G: 1 INJECTION, SOLUTION INTRAVENOUS at 12:42

## 2018-04-24 RX ADMIN — ASPIRIN 81 MG: 81 TABLET ORAL at 08:59

## 2018-04-24 RX ADMIN — ATORVASTATIN CALCIUM 20 MG: 20 TABLET, FILM COATED ORAL at 08:59

## 2018-04-24 RX ADMIN — VORTIOXETINE 10 MG: 10 TABLET, FILM COATED ORAL at 08:59

## 2018-04-24 RX ADMIN — SODIUM CHLORIDE 100 ML/HR: 9 INJECTION, SOLUTION INTRAVENOUS at 12:42

## 2018-04-24 NOTE — CONSULTS
"Adult Nutrition  Assessment/PES    Patient Name:  Ann Marie Blanton  YOB: 1933  MRN: [id number]  Admit Date:  4/23/2018    Assessment Date:  4/24/2018    Comments:  Nutrition screen. Pt receiving regular diet with supplements.  Po intake good. She states she likes any flavor of the supplement.          Adult Nutrition Assessment     Row Name 04/24/18 0849       Nutrition Prescription PO    Current PO Diet Regular    Supplement Ensure Enlive    Supplement Frequency 3 times a day    Row Name 04/24/18 0848       Labs/Procedures/Meds    Lab Results Reviewed reviewed    Lab Results Comments glu, bun       Physical Findings    Skin --   intact    Row Name 04/24/18 0847       Nutrition/Diet History    Typical Food/Fluid Intake good    Supplemental Drinks/Foods/Additives any flavor       Anthropometrics    Height 167.6 cm (65.98\")    Weight 63.5 kg (139 lb 15.9 oz)       Ideal Body Weight (IBW)    Ideal Body Weight (IBW) (kg) 59.54    % Ideal Body Weight 106.65       Body Mass Index (BMI)    BMI (kg/m2) 22.65    BMI Assessment BMI 18.5-24.9: normal       IBW Adjustment, Para/Tetraplegia    5% Adjustment, Para (IBW) 56.56    10% Adjustment, Para (IBW) 53.59    10% Adjustment, Tetra (IBW) 53.59    15% Adjustment, Tetra (IBW) 50.61    Row Name 04/24/18 0846       Reason for Assessment    Reason For Assessment per organizational policy    Diagnosis --   syncope, hx of dementia, depression, HTN, HLD          Problem/Interventions:        Problem 1     Row Name 04/24/18 0849       Nutrition Diagnoses Problem 1    Problem 1 Nutrition Appropriate for Condition at this Time    Etiology (related to) MNT for Treatment/Condition                    Intervention Goal     Row Name 04/24/18 0849       Intervention Goal    General Maintain nutrition    PO PO intake (%)    PO Intake % 80 %    Weight Maintain weight            Nutrition Intervention     Row Name 04/24/18 0850       Nutrition Intervention    RD/Tech Action Follow " Tx progress;Care plan reviewd;Interview for preference;Supplement provided              Education/Evaluation     Row Name 04/24/18 0850       Education    Education Will Instruct as appropriate       Monitor/Evaluation    Monitor Per protocol        Electronically signed by:  Shona Lopes RD  04/24/18 8:50 AM

## 2018-04-24 NOTE — PLAN OF CARE
Problem: Patient Care Overview  Goal: Plan of Care Review  Outcome: Ongoing (interventions implemented as appropriate)   04/24/18 0420   Coping/Psychosocial   Plan of Care Reviewed With patient   OTHER   Outcome Summary vital signs stable. patient confused overnight-disoriented to place and situation. no falls. frequent reorientation overnight. cardiology consulted to see today. echo ordered for this morning. encourage rest.      Goal: Individualization and Mutuality  Outcome: Ongoing (interventions implemented as appropriate)    Goal: Discharge Needs Assessment  Outcome: Ongoing (interventions implemented as appropriate)      Problem: Fall Risk (Adult)  Goal: Absence of Fall  Outcome: Ongoing (interventions implemented as appropriate)

## 2018-04-24 NOTE — CONSULTS
Date of Consultation: 18    Referral Provider: Gorge Whitt MD     Reason for Consultation: Syncope    Encounter Provider: Kash Johnson MD    Group of Service: [facility]     Patient Name: Ann Marie Blanton    :1933    Chief complaint:  Syncope    History of Present Illness:  Ms. Ann Maire Blanton is an 85 year old female patient with a history of HTN, HLD, anxiety and advanced dementia.     She presented to the ED via EMS from the [facility] after becoming pale and diaphoretic associated with syncopal episode. She reported that she went from sitting to standing when she became dizzy and fell to her knees. Upon arrival vitals were HR 60 and /65. Troponin was negative. UA showed possible infection with hematuria and she was started on rocephin. CT head showed mild small vessel disease but nothing acute. CXR was negative.     We were consulted for syncope. Orthostatics were lying /62, sitting /61 and standing /65. TSH is 1.9. She is receiving ASA. An echo has been ordered.  She is not a very accurate historian.    Past Medical History:   Diagnosis Date   • Anxiety    • Depression    • Eustachian tube dysfunction    • Eye exam normal    • Hyperlipidemia    • Hypokalemia    • Mild cognitive impairment    • Sciatica    • Seasonal allergies          Past Surgical History:   Procedure Laterality Date   • APPENDECTOMY     • EYE SURGERY Left 2015    Dr. Saldana   • HYSTERECTOMY     • KNEE SURGERY  2010    torn meniscus   • TONSILLECTOMY           Allergies   Allergen Reactions   • Sulfa Antibiotics Other (See Comments)         No current facility-administered medications on file prior to encounter.      Current Outpatient Prescriptions on File Prior to Encounter   Medication Sig Dispense Refill   • simvastatin (ZOCOR) 40 MG tablet Take 1 tablet by mouth Every Night. 30 tablet 5         Social History     Social History   • Marital status:   "    Spouse name: N/A   • Number of children: N/A   • Years of education: N/A     Occupational History   • Not on file.     Social History Main Topics   • Smoking status: Never Smoker   • Smokeless tobacco: Never Used   • Alcohol use No   • Drug use: No   • Sexual activity: Not on file     Other Topics Concern   • Not on file     Social History Narrative   • No narrative on file         Family History   Problem Relation Age of Onset   • Kidney disease Mother    • Nephrolithiasis Mother    • Diabetes Mother    • Heart disease Father    • Diabetes Other    • Breast cancer Sister    • Thyroid disease Brother        REVIEW OF SYSTEMS:   The patient has dementia, and an accurate review of systems could not be obtained.     Objective:     Vitals:    04/23/18 1900 04/23/18 2300 04/24/18 0737 04/24/18 0847   BP: 133/78 101/55 147/66    BP Location: Right arm Right arm Right arm    Patient Position: Lying Lying Lying    Pulse: 68 75 67    Resp: 20 20 18    Temp: 97.9 °F (36.6 °C) 98.5 °F (36.9 °C) 97.9 °F (36.6 °C)    TempSrc: Oral Oral Oral    SpO2: 96%  93%    Weight:    63.5 kg (139 lb 15.9 oz)   Height:    167.6 cm (65.98\")     Body mass index is 22.61 kg/m².  Flowsheet Rows    Flowsheet Row First Filed Value   Admission Height 165.1 cm (65\") Documented at 04/23/2018 1115   Admission Weight 63.5 kg (140 lb) Documented at 04/23/2018 1115           General:    No acute distress, alert and fairly appropriate, pleasant                   Head:    Normocephalic, atraumatic.   Eyes:          Conjunctivae and sclerae normal, no icterus, PERRLA   Throat:   No oral lesions, no thrush, oral mucosa moist.    Neck:   Supple, trachea midline.   Lungs:     Clear to auscultation bilaterally     Heart:    Regular rhythm and normal rate.  II/VI SM RUSB, LUSB   Abdomen:     Soft, non-tender, non-distended, positive bowel sounds.    Extremities:   No clubbing, cyanosis, or edema.     Pulses:   Pulses palpable and equal bilaterally.    Skin:  "  No bleeding or rash.   Neuro:   Non-focal.  Moves all extremities well.    Psychiatric:   Normal mood and affect.       Lab Review:                  Results from last 7 days  Lab Units 04/24/18  0542   SODIUM mmol/L 143   POTASSIUM mmol/L 3.7   CHLORIDE mmol/L 105   CO2 mmol/L 26.1   BUN mg/dL 17   CREATININE mg/dL 0.72   GLUCOSE mg/dL 106*   CALCIUM mg/dL 8.7       Results from last 7 days  Lab Units 04/23/18  1131   TROPONIN T ng/mL <0.010       Results from last 7 days  Lab Units 04/24/18  0542   WBC 10*3/mm3 6.52   HEMOGLOBIN g/dL 13.8   HEMATOCRIT % 44.3   PLATELETS 10*3/mm3 207               Results from last 7 days  Lab Units 04/23/18  1131   MAGNESIUM mg/dL 2.3       EKG 4/23/2018      EKG 4/3/2016      EKG (reviewed by me personally):      Assessment:   1. Syncope   2. Systolic murmur  3. Dementia     Plan:       The patient was minimally orthostatic in the ER, although repeat orthostatics did not show any significant issues.  The patient is not a good historian, and it is unclear as to what happened.  I would recommend checking an echocardiogram at this point, along with a bilateral carotid artery Doppler ultrasound.  If these tests are relatively unrevealing, I would recommend a 14 day Zio Patch at the time of discharge.  She does have a murmur on examination which may be aortic stenosis, although the second heart sound is preserved.  This would go against severe aortic stenosis.  However, further plans will be made after the echocardiogram.  We will continue to follow the patient with you, and make recommendations accordingly.    Thank you very much this consult.    Dex Johnson M.D.

## 2018-04-24 NOTE — PROGRESS NOTES
Discharge Planning Assessment  [facility]     Patient Name: Ann Marie Blanton  MRN: [id number]  Today's Date: 4/24/2018    Admit Date: 4/23/2018          Discharge Needs Assessment     Row Name 04/24/18 1548       Living Environment    Lives With alone    Current Living Arrangements home/apartment/condo    Primary Care Provided by self    Provides Primary Care For no one, unable/limited ability to care for self    Family Caregiver if Needed child(cate), adult    Family Caregiver Names But both work    Quality of Family Relationships helpful;involved;supportive    Able to Return to Prior Arrangements yes       Resource/Environmental Concerns    Resource/Environmental Concerns none       Transition Planning    Patient/Family Anticipates Transition to home with help/services;other (see comments)    Patient/Family Anticipated Services at Transition home health care;community agency;other (see comments)    Transportation Anticipated family or friend will provide;other (see comments)       Discharge Needs Assessment    Readmission Within the Last 30 Days no previous admission in last 30 days    Concerns to be Addressed home safety;decision making;discharge planning    Equipment Currently Used at Home none    Anticipated Changes Related to Illness inability to care for self    Equipment Needed After Discharge other (see comments)    Outpatient/Agency/Support Group Needs adult     Discharge Facility/Level of Care Needs other (see comments)    Offered/Gave Vendor List yes    Current Discharge Risk lives alone            Discharge Plan     Row Name 04/24/18 1541       Plan    Plan Undetermined-    Patient/Family in Agreement with Plan yes    Plan Comments Patient is confused, CCP called pts daughter Linette Gamboa ([phone]) via outbound call, introduced self and explained CCP role. Verified facesheet and confirmed [facility]. Sanjana states pt was living alone prior to admission,  attends an adult day program and rides [employer]. Pt has 3 steps to enter home. Daughter denies any DME, HH or SNF hx. Pts daughter states Advance Directive should be on file here at the hospital and has pts son David Blanton as primary  POA but in his abscence, Sanjana Gamboa is decision maker.  CCP discussed dc planning and pts daughter is unsure of dc needs at this time. CCP explained awaiting PT eval to further assist, but pt is confused and would not be able to return home alone. CCP left resources at bedside for her and will follow up tomorrow to assist in dc plan. Paz RN/CCP        Destination     No service coordination in this encounter.      Durable Medical Equipment     No service coordination in this encounter.      Dialysis/Infusion     No service coordination in this encounter.      Home Medical Care     No service coordination in this encounter.      Social Care     No service coordination in this encounter.                Demographic Summary     Row Name 04/24/18 1772       General Information    Referral Source admission list    Reason for Consult discharge planning    Preferred Language English       Contact Information    Permission Granted to Share Info With family/designee            Functional Status     Row Name 04/24/18 9458       Functional Status    Usual Activity Tolerance good;moderate    Current Activity Tolerance fair       Functional Status, IADL    Medications independent    Meal Preparation independent    Housekeeping assistive person    Laundry assistive person    Shopping assistive person       Mental Status    General Appearance WDL WDL       Mental Status Summary    Recent Changes in Mental Status/Cognitive Functioning decision making/judgment;thought patterns;mental status       Employment/    Employment Status retired            Psychosocial    No documentation.           Abuse/Neglect    No documentation.           Legal     Row Name 04/24/18 8104        Financial/Legal    Finance Comments Pt has advance directive part of medical records, CCP will try to locate. Paz MERCER/CCP            Substance Abuse    No documentation.           Patient Forms     Row Name 04/24/18 1558       Patient Forms    Provider Choice List Delivered   LEFT AT BEDSIDE FOR DAUGHTER    Delivered to Support person    Method of delivery Telephone    Row Name 04/24/18 1548       Patient Forms    Patient Observation Letter Delivered    Delivered to Support person    Method of delivery Telephone          Malaika Foreman, RN

## 2018-04-24 NOTE — THERAPY EVALUATION
Acute Care - Physical Therapy Initial Evaluation  [facility]     Patient Name: Ann Marie Blanton  : 1933  MRN: [id number]  Today's Date: 2018   Onset of Illness/Injury or Date of Surgery: (P) 18  Date of Referral to PT: (P) 18  Referring Physician: Andrea (P)      Admit Date: 2018    Visit Dx:     ICD-10-CM ICD-9-CM   1. Syncope and collapse R55 780.2   2. Urinary tract infection with hematuria, site unspecified N39.0 599.0    R31.9    3. Systolic murmur R01.1 785.2   4. Impaired functional mobility and activity tolerance Z74.09 V49.89     Patient Active Problem List   Diagnosis   • Hyperlipidemia   • Depression   • Anxiety   • Osteoarthritis of both knees   • Abrasion of right side of back   • ERRONEOUS ENCOUNTER--DISREGARD   • Syncope and collapse   • Dementia   • Depression   • Heart murmur   • DNR (do not resuscitate)   • Acute cystitis without hematuria     Past Medical History:   Diagnosis Date   • Anxiety    • Depression    • Eustachian tube dysfunction    • Eye exam normal    • Hyperlipidemia    • Hypokalemia    • Mild cognitive impairment    • Sciatica    • Seasonal allergies      Past Surgical History:   Procedure Laterality Date   • APPENDECTOMY     • EYE SURGERY Left 2015    Dr. Saldana   • HYSTERECTOMY     • KNEE SURGERY  2010    torn meniscus   • TONSILLECTOMY          PT ASSESSMENT (last 12 hours)      Physical Therapy Evaluation     Row Name 18 1616 18 1611       PT Evaluation Time/Intention    Subjective Information (P)  no complaints  -RE (P)  no complaints  -RE    Document Type (P)  evaluation  -RE (P)  evaluation  -RE    Mode of Treatment (P)  physical therapy  -RE (P)  physical therapy  -RE    Patient Effort (P)  good  -RE (P)  good  -RE    Symptoms Noted During/After Treatment (P)  dizziness;other (see comments)   Pt anxious  -RE (P)  dizziness   Pt anxious at entry.  -RE    Comment (P)  Pt anxious sitting EOB at entry calling out for  daughter. VSS. No Acute distress.  -RE (P)  Pt anxious at entry and confused. VSS. No acute distress.   -RE    Row Name 04/24/18 1616 04/24/18 1611       General Information    Patient Profile Reviewed? (P)  yes  -RE (P)  yes  -RE    Onset of Illness/Injury or Date of Surgery (P)  04/23/18  -RE (P)  04/23/18  -RE    Referring Physician (P)   Emre  -RE (P)  Emre  -RE    Patient Observations (P)  agree to therapy;cooperative   Pt anxious  -RE (P)  agree to therapy;cooperative   Pt confused and anxious.  -RE    General Observations of Patient (P)  Pt sitting EOB at entry calling into hallway for daughter.   -RE  --    Prior Level of Function (P)  independent:   Difficult to assess secondary to pt confusion.  -RE  --    Equipment Currently Used at Home (P)  none  -RE  --    Pertinent History of Current Functional Problem (P)  This visit for syncope at adult day care center during ambulation. Pt w/ h/o dementia that has been increasing.  -RE  --    Existing Precautions/Restrictions (P)  fall  -RE  --    Risks Reviewed (P)  patient:  -RE  --    Barriers to Rehab (P)  none identified  -RE  --    Row Name 04/24/18 1616          Relationship/Environment    Lives With (P)  alone  -RE     Family Caregiver if Needed (P)  child(cate), adult  -RE     Row Name 04/24/18 1616          Resource/Environmental Concerns    Current Living Arrangements (P)  home/apartment/condo  -RE     Resource/Environmental Concerns (P)  none  -RE     Row Name 04/24/18 1616          Home Main Entrance    Number of Stairs, Main Entrance (P)  four  -RE     Stair Railings, Main Entrance (P)  railings on both sides of stairs  -RE     Row Name 04/24/18 1616          Stairs Within Home, Primary    Number of Stairs, Within Home, Primary (P)  other (see comments)   11  -RE     Stair Railings, Within Home, Primary (P)  railing on right side (ascending)  -RE     Row Name 04/24/18 1616          Cognitive Assessment/Intervention- PT/OT    Orientation Status  (Cognition) (P)  oriented to;person  -RE     Follows Commands (Cognition) (P)  75-90% accuracy;verbal cues/prompting required;follows one step commands  -RE     Safety Deficit (Cognitive) (P)  moderate deficit;awareness of need for assistance;insight into deficits/self awareness;safety precautions awareness  -RE     Personal Safety Interventions (P)  fall prevention program maintained;gait belt;nonskid shoes/slippers when out of bed  -RE     Row Name 04/24/18 1616          Safety Issues, Functional Mobility    Safety Issues Affecting Function (Mobility) (P)  awareness of need for assistance;insight into deficits/self awareness;safety precaution awareness  -RE     Impairments Affecting Function (Mobility) (P)  endurance/activity tolerance  -RE     Row Name 04/24/18 1616          Bed Mobility Assessment/Treatment    Bed Mobility Assessment/Treatment (P)  rolling right;supine-sit;sit-supine  -RE     Rolling Right [place] (Bed Mobility) (P)  supervision  -RE     Supine-Sit [place] (Bed Mobility) (P)  supervision  -RE     Sit-Supine [place] (Bed Mobility) (P)  supervision  -RE     Row Name 04/24/18 1616          Transfer Assessment/Treatment    Transfer Assessment/Treatment (P)  sit-stand transfer;stand-sit transfer  -RE     Sit-Stand [place] (Transfers) (P)  supervision  -RE     Stand-Sit [place] (Transfers) (P)  supervision  -RE     Row Name 04/24/18 1616          Gait/Stairs Assessment/Training    [place] Level (Gait) (P)  stand by assist  -RE     Distance in Feet (Gait) (P)  150  -RE     Pattern (Gait) (P)  swing-through  -RE     Deviations/Abnormal Patterns (Gait) (P)  base of support, narrow  -RE     Row Name 04/24/18 1616          General ROM    GENERAL ROM COMMENTS (P)  BUE/BLE WFL  -RE     Row Name 04/24/18 1616          General Assessment (Manual Muscle Testing)    Comment, General Manual Muscle Testing (MMT) Assessment (P)  BUE/BLE 4/5;  equal bilaterally  -RE     Row Name  04/24/18 1616          Motor Assessment/Intervention    Additional Documentation (P)  Therapeutic Exercise (Group)  -RE     Row Name 04/24/18 1616          Therapeutic Exercise    Additional Documentation (P)  --   B AP's x5  -RE     Row Name 04/24/18 1616          Pain Assessment    Additional Documentation (P)  Pain Scale: Numbers Pre/Post-Treatment (Group)  -RE     Row Name 04/24/18 1616          Pain Scale: Numbers Pre/Post-Treatment    Pain Scale: Numbers, Pretreatment (P)  0/10 - no pain  -RE     Row Name 04/24/18 1616          Plan of Care Review    Plan of Care Reviewed With (P)  patient  -RE     Row Name 04/24/18 1616          Physical Therapy Clinical Impression    Date of Referral to PT (P)  04/24/18  -RE     Functional Level at Time of Evaluation (PT Clinical Impression) (P)  supervision  -RE     Patient/Family Goals Statement (PT Clinical Impression) (P)  Return to PLOF.  -RE     Criteria for Skilled Interventions Met (PT Clinical Impression) (P)  no problems identified which require skilled intervention  -RE     Pathology/Pathophysiology Noted (Describe Specifically for Each System) (P)  --   Evaluation only  -RE     Impairments Found (describe specific impairments) (P)  other (see comments)   Evaluation only  -RE     Rehab Potential (PT Clinical Summary) (P)  other (see comments)   Evaluation only  -RE     Row Name 04/24/18 1616          Vital Signs    Pre Systolic BP Rehab (P)  147  -RE     Pre Treatment Diastolic BP (P)  66  -RE     Pretreatment Heart Rate (beats/min) (P)  77  -RE     Posttreatment Heart Rate (beats/min) (P)  74  -RE     Pre SpO2 (%) (P)  98  -RE     O2 Delivery Pre Treatment (P)  room air  -RE     Post SpO2 (%) (P)  97  -RE     O2 Delivery Post Treatment (P)  room air  -RE     Row Name 04/24/18 1616          Positioning and Restraints    Pre-Treatment Position (P)  in bed  -RE     Post Treatment Position (P)  bed  -RE     Row Name 04/24/18 1616          Living Environment    Home  Accessibility (P)  stairs to enter home;stairs within home  -RE       User Key  (r) = Recorded By, (t) = Taken By, (c) = Cosigned By    Initials Name Provider Type    VIVIENNE Jose, PT Student PT Student          Physical Therapy Education     Title: PT OT SLP Therapies (Done)     Topic: Physical Therapy (Done)     Point: Mobility training (Done)    Learning Progress Summary     Learner Status Readiness Method Response Comment Documented by    Patient Done Acceptance MARIA GHOLLY POLLOCK,NR  RE 04/24/18 1631          Point: Home exercise program (Done)    Learning Progress Summary     Learner Status Readiness Method Response Comment Documented by    Patient Done Acceptance SETH CUMMINS DU,NR  RE 04/24/18 1631          Point: Body mechanics (Done)    Learning Progress Summary     Learner Status Readiness Method Response Comment Documented by    Patient Done Acceptance SETH CUMMINS DU,NR  RE 04/24/18 1631          Point: Precautions (Done)    Learning Progress Summary     Learner Status Readiness Method Response Comment Documented by    Patient Done Acceptance MARIA GHOLLY POLLOCK,NR  RE 04/24/18 1631                      User Key     Initials Effective Dates Name Provider Type Discipline    RE 02/05/18 -  Ruben Jose, PT Student PT Student PT                PT Recommendation and Plan  Anticipated Discharge Disposition (PT): (P) home with home health care, skilled nursing facility (SNF)  Planned Therapy Interventions (PT Eval): (P) other (see comments) (Evaluation only)  Therapy Frequency (PT Clinical Impression): (P) evaluation only  Outcome Summary/Treatment Plan (PT)  Anticipated Discharge Disposition (PT): (P) home with home health care, skilled nursing facility (SNF)  Plan of Care Reviewed With: (P) patient  Outcome Summary: (P) Pt is an 86 y/o F presenting this visit secondary to syncopal episode at adult day care center. Pt confused at entry w/ h/o dementia that has been getting worse. ROM wfl. No noted strength deficits. Pt SBA for  all functional mobility and ambulation. very mild dizziness w/ ambulation. No noted impairments that require skilled PT at this time. Please reconsult PT it patient condition changes. PT will D/C skilled services at this time. PT D/C recommendation difficult secondary to pt confusion about assistance and location. Currently D/C recommendation is SNF memory care secondary to patient being unsafe to live alone. If pt has 24 hour supervision and assist of children PT would recommend D/C home w/ HH and assist of children.           Outcome Measures     Row Name 04/24/18 3828             How much help from another person do you currently need...    Turning from your back to your side while in flat bed without using bedrails? (P)  4  -RE      Moving from lying on back to sitting on the side of a flat bed without bedrails? (P)  4  -RE      Moving to and from a bed to a chair (including a wheelchair)? (P)  3  -RE      Standing up from a chair using your arms (e.g., wheelchair, bedside chair)? (P)  3  -RE      Climbing 3-5 steps with a railing? (P)  3  -RE      To walk in hospital room? (P)  3  -RE      AM-PAC 6 Clicks Score (P)  20  -RE         Functional Assessment    Outcome Measure Options (P)  AM-PAC 6 Clicks Basic Mobility (PT)  -RE        User Key  (r) = Recorded By, (t) = Taken By, (c) = Cosigned By    Initials Name Provider Type    VIVIENNE Jose PT Student PT Student           Time Calculation:         PT Charges     Row Name 04/24/18 1638             Time Calculation    Start Time (P)  1540  -RE      Stop Time (P)  1605  -RE      Time Calculation (min) (P)  25 min  -RE      PT Received On (P)  04/24/18  -RE        User Key  (r) = Recorded By, (t) = Taken By, (c) = Cosigned By    Initials Name Provider Type    VIVIENNE Jose PT Student PT Student          Therapy Charges for Today     Code Description Service Date Service Provider Modifiers Qty    40673793533  PT EVAL LOW COMPLEXITY 1 4/24/2018  Ruben Jose, PT Student GP 1    19825874033 HC PT THER PROC EA 15 MIN 4/24/2018 Ruben Jose, PT Student GP 2          PT G-Codes  Outcome Measure Options: (P) AM-PAC 6 Clicks Basic Mobility (PT)      Ruben Jose, PT Student  4/24/2018

## 2018-04-24 NOTE — PLAN OF CARE
Problem: Patient Care Overview  Goal: Plan of Care Review  Outcome: Outcome(s) achieved Date Met: 04/24/18 04/24/18 8097   Coping/Psychosocial   Plan of Care Reviewed With patient   OTHER   Outcome Summary Pt is an 86 y/o F presenting this visit secondary to syncopal episode at adult day care center. Pt confused at entry w/ h/o dementia that has been getting worse. ROM wfl. No noted strength deficits. Pt SBA for all functional mobility and ambulation. very mild dizziness w/ ambulation. No noted impairments that require skilled PT at this time. Please reconsult PT it patient condition changes. PT will D/C skilled services at this time. PT D/C recommendation difficult secondary to pt confusion about assistance level and location. Currently D/C recommendation is SNF memory care secondary to patient being unsafe to live alone at the present time. If pt has 24 hour supervision and assist of children PT would recommend D/C home w/ HH and assist of children.

## 2018-04-24 NOTE — H&P
HISTORY AND PHYSICAL   [facility]        Patient Identification:  Name: Ann Marie Blanton  Age: 85 y.o.  Sex: female  :  1933  MRN: [id number]                     Primary Care Physician: PRANEETH Toth    Chief Complaint:  Syncope    History of Present Illness:   Pleasant 85-year-old female.  She has advanced dementia and cannot really give much history.  History comes from her daughter who is at bedside who in turn got much of the history from the caregivers at the  center mother state.  The daughter notes that the patient was quite emotional through much of the morning.  No specific physical complaints however.  The degree to the  center and were informed later that she had passed out.  Reportedly she was sitting at a table and got up that after walking a few feet became pale, diaphoretic and slumped to her knees.  She was, by one of the tendons at the facility.  Reportedly she woke back up again in less than a minute, possibly within a few seconds.  Initially the patient had stated that she had no recollection of this.  At this point she states she does but is uncertain whether this is just reinforced family members.  Presently the patient states that she feels well.      Past Medical History:  Past Medical History:   Diagnosis Date   • Anxiety    • Depression    • Eustachian tube dysfunction    • Eye exam normal    • Hyperlipidemia    • Hypokalemia    • Mild cognitive impairment    • Sciatica    • Seasonal allergies      Past Surgical History:  Past Surgical History:   Procedure Laterality Date   • APPENDECTOMY     • EYE SURGERY Left 2015    Dr. Saldana   • HYSTERECTOMY     • KNEE SURGERY  2010    torn meniscus   • TONSILLECTOMY        Home Meds:  Prescriptions Prior to Admission   Medication Sig Dispense Refill Last Dose   • aspirin 81 MG EC tablet Take 81 mg by mouth Daily.      • simvastatin (ZOCOR) 40 MG tablet Take 1 tablet by mouth Every Night. 30  tablet 5 More than a month at Unknown time   • Vortioxetine HBr (TRINTELLIX) 10 MG tablet Take 10 mg by mouth Daily.          Allergies:  Allergies   Allergen Reactions   • Sulfa Antibiotics Other (See Comments)     Immunizations:  Immunization History   Administered Date(s) Administered   • PPD Test 2017     Social History:   Social History     Social History Narrative   • No narrative on file     Social History   Substance Use Topics   • Smoking status: Never Smoker   • Smokeless tobacco: Never Used   • Alcohol use No     Family History:  Family History   Problem Relation Age of Onset   • Kidney disease Mother    • Nephrolithiasis Mother    • Diabetes Mother    • Heart disease Father    • Diabetes Other    • Breast cancer Sister    • Thyroid disease Brother         Review of Systems  Review of Systems   Unable to perform ROS: Dementia       Objective:  tMax 24 hrs: Temp (24hrs), Av.6 °F (36.4 °C), Min:97.2 °F (36.2 °C), Max:97.9 °F (36.6 °C)    Vitals Ranges:   Temp:  [97.2 °F (36.2 °C)-97.9 °F (36.6 °C)] 97.9 °F (36.6 °C)  Heart Rate:  [60-71] 68  Resp:  [16-20] 20  BP: (111-138)/(58-78) 133/78      Exam:  Physical Exam   Constitutional: She appears well-developed and well-nourished. No distress.   HENT:   Head: Normocephalic and atraumatic.   Right Ear: External ear normal.   Left Ear: External ear normal.   Nose: Nose normal.   Mouth/Throat: Oropharynx is clear and moist.   Eyes: Conjunctivae and EOM are normal. Right eye exhibits no discharge. Left eye exhibits no discharge. No scleral icterus.   Neck: No JVD present. No tracheal deviation present. No thyromegaly present.   Cardiovascular: Normal rate, regular rhythm and intact distal pulses.  Exam reveals no gallop and no friction rub.    Murmur (2/6 systolic murmur upper sternal borders radiating to the carotidsbilaterally.) heard.  Pulmonary/Chest: Effort normal and breath sounds normal. No respiratory distress. She exhibits no tenderness.    Abdominal: Soft. Bowel sounds are normal. She exhibits no distension and no mass. There is no tenderness. There is no rebound and no guarding.   Musculoskeletal: She exhibits no edema or deformity.   Neurological: She is alert. No cranial nerve deficit. She exhibits normal muscle tone. Coordination normal.   Oriented to person and the fact that she is in a hospital.   Skin: Skin is warm and dry. She is not diaphoretic.   Psychiatric: She has a normal mood and affect. Her behavior is normal. Judgment and thought content normal.       Data Review:  All labs and radiology reviewed.    Assessment:  Principal Problem:    Syncope and collapse  Active Problems:    Dementia    Depression    Heart murmur    Abnormal UTI.  This is a contaminated specimen.  Only 3-5 WBCs and this does not indicate UTI.  Do not recommend ongoing antibiotics.    Plan:  The history sounds very much like orthostatic hypotension.  She is only minimally orthostatic in ER however.  We will recheck orthostatics.  Monitor rhythm through the night.  She has a heart murmur that sounds suspicious for aortic stenosis.  We'll check a echocardiogram in the morning.  We'll bring her cardiologist in on the case also.    Gorge Whitt MD  4/23/2018  10:17 PM    EMR Dragon/Transcription disclaimer:   Much of this encounter note is an electronic transcription/translation of spoken language to printed text. The electronic translation of spoken language may permit erroneous, or at times, nonsensical words or phrases to be inadvertently transcribed; Although I have reviewed the note for such errors, some may still exist.

## 2018-04-24 NOTE — PROGRESS NOTES
Name: Ann Marie Blantno ADMIT: 2018   : 1933  PCP: PRANEETH Toth    MRN: [id number] LOS: 0 days   AGE/SEX: 85 y.o. female  ROOM: [facility]   Subjective   Cc- sycnope    [facility]  Became pale, diaphoretic  Had had some low back pain for a few days  Increased confusion  +nausea  Decreased po intake    Feels better now  Denies any dysuria or hematuria    ROS  No f/c  +n/-v  No cp/palp  No soa/cough      Objective   Vital Signs  Temp:  [97.2 °F (36.2 °C)-98.5 °F (36.9 °C)] 97.9 °F (36.6 °C)  Heart Rate:  [60-75] 67  Resp:  [16-20] 18  BP: (101-147)/(55-78) 147/66  SpO2:  [93 %-98 %] 93 %  on   ;   Device (Oxygen Therapy): room air  Body mass index is 22.61 kg/m².    Physical Exam   Constitutional: She appears well-developed and well-nourished.   HENT:   Head: Normocephalic and atraumatic.   Eyes: Conjunctivae are normal. No scleral icterus.   Neck: Neck supple. No tracheal deviation present.   Cardiovascular: Normal rate and regular rhythm.    Murmur (trace) heard.  Pulmonary/Chest: Effort normal and breath sounds normal.   Abdominal: Soft. There is no tenderness. There is no guarding.   Genitourinary:   Genitourinary Comments: Deferred    Neurological: She is alert. She exhibits normal muscle tone.   Skin: Skin is warm and dry.   Psychiatric: She has a normal mood and affect. Her behavior is normal.       Results Review:       I reviewed the patient's new clinical results.    Results from last 7 days  Lab Units 18  0542 18  1131   WBC 10*3/mm3 6.52 6.87   HEMOGLOBIN g/dL 13.8 14.4   PLATELETS 10*3/mm3 207 213       Results from last 7 days  Lab Units 18  0542 18  1131   SODIUM mmol/L 143 142   POTASSIUM mmol/L 3.7 3.8   CHLORIDE mmol/L 105 101   CO2 mmol/L 26.1 27.1   BUN mg/dL 17 26*   CREATININE mg/dL 0.72 0.70   GLUCOSE mg/dL 106* 101*   Estimated Creatinine Clearance: 51.5 mL/min (by C-G formula based on SCr of 0.72 mg/dL).    Results from  last 7 days  Lab Units 04/24/18  0542 04/23/18  1131   CALCIUM mg/dL 8.7 8.9   ALBUMIN g/dL  --  4.00   MAGNESIUM mg/dL  --  2.3   EMERGENCY NONCONTRAST HEAD CT 04/23/2018     CLINICAL HISTORY: Vertigo, syncope.     TECHNIQUE: Spiral CT images were obtained from the base of the skull to  the vertex without intravenous contrast. Images were reformatted and are  submitted in 3 mm thick axial CT sections with brain algorithm.     COMPARISON: There are new prior head CTs for comparison.     FINDINGS: There is some mild low-density in the periventricular white  matter consistent with mild small vessel disease. The remainder of the  brain parenchyma is normal in attenuation. The ventricles are normal in  size. I see no mass effect, no midline shift, and new extra-axial fluid  collections are identified. There is no evidence of acute intracranial  hemorrhage. There is mild cerebral atrophy most pronounced in the  temporal lobes. Paranasal sinuses, mastoid air cells and middle ear  cavities are clear.     IMPRESSION:  There is mild small vessel disease in the cerebral white  matter and mild cerebral atrophy most pronounced in the temporal lobes.  The remainder of the head CT is unremarkable.      The results were communicated to Dr. Edmond in the emergency room by  telephone 04/23/2018 at 2:25 PM.     Radiation dose reduction techniques were utilized, including automated  exposure control and exposure modulation based on body size.     This report was finalized on 4/24/2018 9:00 AM by Dr. Flo Maier MD.     CT ABDOMEN AND PELVIS WITHOUT IV CONTRAST     HISTORY: 85-year-old female with abdominal pain and nausea.     TECHNIQUE: Radiation dose reduction techniques were utilized, including  automated exposure control and exposure modulation based on body size.   3 mm images were obtained through the abdomen and pelvis without the  administration of IV contrast. Compared with previous CT from  02/04/2008.     FINDINGS: There  are several hepatic cysts with the largest in the right  hepatic lobe measuring 3.4 cm, previously 2.8 cm. Noncontrasted  gallbladder, spleen, pancreas, adrenals, and kidneys appear  unremarkable. There is a 1 cm right renal cyst. No definite acute  abnormality is seen. There is moderately extensive sigmoid  diverticulosis without evidence for diverticulitis. The uterus is  surgically absent. Adnexa appear unremarkable. There are moderately  extensive abdominal aortic atherosclerotic changes without aneurysmal  dilatation.     IMPRESSION:  No acute abnormality is seen.     Discussed with Dr. Edmond.      aspirin 81 mg Oral Daily   atorvastatin 20 mg Oral Daily   ceftriaxone 1 g Intravenous Q24H   Vortioxetine HBr 10 mg Oral Daily       sodium chloride 100 mL/hr   Diet Regular      Assessment/Plan      Active Hospital Problems (** Indicates Principal Problem)    Diagnosis Date Noted   • **Syncope and collapse [R55] 04/23/2018   • DNR (do not resuscitate) [Z66] 04/24/2018   • Acute cystitis without hematuria [N30.00] 04/24/2018   • Depression [F32.9] 04/23/2018   • Heart murmur [R01.1] 04/23/2018      Resolved Hospital Problems    Diagnosis Date Noted Date Resolved   No resolved problems to display.       Ms. Blanton is a 85 y.o. female with a history of mild cognitive impairment who has been admitted with syncope    · Syncope sounds most likely like vasovagal or orthostatic. Some ectopy on telemetry, murmur heard by ER and Dr. Whitt (I only hear faint), ECHO ordered and Cardiology consult. Daily orthostatics.  · Gentle fluids, PT consultation  · DNR/DNI discussed with patient and she has understanding/decisional to code status discussion  · Denies any dysuria or urinary symptoms. Family had noted some nausea and lower back pain- hard to say if symptomatic or not but will treat with short course for UTI and add back ceftriaxone    Reviewed previous records        Casper Andrea MD  [facility]  04/24/18  9:30 AM

## 2018-04-25 ENCOUNTER — APPOINTMENT (OUTPATIENT)
Dept: CARDIOLOGY | Facility: HOSPITAL | Age: 83
End: 2018-04-25
Attending: INTERNAL MEDICINE

## 2018-04-25 VITALS
HEIGHT: 66 IN | SYSTOLIC BLOOD PRESSURE: 123 MMHG | OXYGEN SATURATION: 95 % | WEIGHT: 138 LBS | DIASTOLIC BLOOD PRESSURE: 76 MMHG | HEART RATE: 71 BPM | RESPIRATION RATE: 18 BRPM | TEMPERATURE: 98.3 F | BODY MASS INDEX: 22.18 KG/M2

## 2018-04-25 LAB
BACTERIA SPEC AEROBE CULT: ABNORMAL
BACTERIA SPEC AEROBE CULT: ABNORMAL

## 2018-04-25 PROCEDURE — G0378 HOSPITAL OBSERVATION PER HR: HCPCS

## 2018-04-25 PROCEDURE — 25010000002 CEFTRIAXONE PER 250 MG: Performed by: INTERNAL MEDICINE

## 2018-04-25 PROCEDURE — 99212 OFFICE O/P EST SF 10 MIN: CPT | Performed by: NURSE PRACTITIONER

## 2018-04-25 PROCEDURE — 96366 THER/PROPH/DIAG IV INF ADDON: CPT

## 2018-04-25 PROCEDURE — 0296T HC EXT ECG > 48HR TO 21 DAY RCRD W/CONECT INTL RCRD: CPT

## 2018-04-25 RX ORDER — CEPHALEXIN 500 MG/1
500 CAPSULE ORAL 3 TIMES DAILY
Status: DISCONTINUED | OUTPATIENT
Start: 2018-04-26 | End: 2018-04-25 | Stop reason: HOSPADM

## 2018-04-25 RX ORDER — CEPHALEXIN 500 MG/1
500 CAPSULE ORAL 3 TIMES DAILY
Qty: 12 CAPSULE | Refills: 0 | Status: SHIPPED | OUTPATIENT
Start: 2018-04-26 | End: 2018-04-30

## 2018-04-25 RX ORDER — ACETAMINOPHEN 325 MG/1
650 TABLET ORAL EVERY 4 HOURS PRN
Start: 2018-04-25

## 2018-04-25 RX ORDER — LANOLIN ALCOHOL/MO/W.PET/CERES
1000 CREAM (GRAM) TOPICAL DAILY
Qty: 90 TABLET | Refills: 0 | Status: SHIPPED | OUTPATIENT
Start: 2018-04-25

## 2018-04-25 RX ADMIN — CEFTRIAXONE SODIUM 1 G: 1 INJECTION, SOLUTION INTRAVENOUS at 09:42

## 2018-04-25 RX ADMIN — VORTIOXETINE 10 MG: 10 TABLET, FILM COATED ORAL at 08:09

## 2018-04-25 RX ADMIN — ASPIRIN 81 MG: 81 TABLET ORAL at 08:09

## 2018-04-25 RX ADMIN — ATORVASTATIN CALCIUM 20 MG: 20 TABLET, FILM COATED ORAL at 08:09

## 2018-04-25 NOTE — PROGRESS NOTES
"    Patient Name: Ann Marie Blanton  :1933  85 y.o.      Patient Care Team:  PRANEETH Toth as PCP - General (Family Medicine)  Truong Matthew DPM as PCP - Claims Attributed    Chief Complaint: follow up syncope    Interval History: No additional episodes. BP stable. No specific complaints.          Objective   Vital Signs  Temp:  [97.7 °F (36.5 °C)-98.5 °F (36.9 °C)] 98.3 °F (36.8 °C)  Heart Rate:  [60-99] 71  Resp:  [18] 18  BP: (114-137)/(64-78) 123/76    Intake/Output Summary (Last 24 hours) at 18 1517  Last data filed at 18 1300   Gross per 24 hour   Intake              470 ml   Output                0 ml   Net              470 ml     Flowsheet Rows    Flowsheet Row First Filed Value   Admission Height 165.1 cm (65\") Documented at 2018 1115   Admission Weight 63.5 kg (140 lb) Documented at 2018 1115          Physical Exam:   General Appearance:    Alert, cooperative, in no acute distress   Lungs:     Clear to auscultation.  Normal respiratory effort and rate.      Heart:    Regular rhythm and normal rate, normal S1 and S2, 2/6 murmur, gallops or rubs.     Chest Wall:    No abnormalities observed   Abdomen:     Soft, nontender, positive bowel sounds.     Extremities:   no cyanosis, clubbing or edema.  No marked joint deformities.  Adequate musculoskeletal strength.       Results Review:      Results from last 7 days  Lab Units 18  0542   SODIUM mmol/L 143   POTASSIUM mmol/L 3.7   CHLORIDE mmol/L 105   CO2 mmol/L 26.1   BUN mg/dL 17   CREATININE mg/dL 0.72   GLUCOSE mg/dL 106*   CALCIUM mg/dL 8.7       Results from last 7 days  Lab Units 18  1131   TROPONIN T ng/mL <0.010       Results from last 7 days  Lab Units 18  0542   WBC 10*3/mm3 6.52   HEMOGLOBIN g/dL 13.8   HEMATOCRIT % 44.3   PLATELETS 10*3/mm3 207           Results from last 7 days  Lab Units 18  1131   MAGNESIUM mg/dL 2.3                   Medication Review:     aspirin 81 mg Oral " Daily   atorvastatin 20 mg Oral Daily   [START ON 4/26/2018] cephalexin 500 mg Oral TID   Vortioxetine HBr 10 mg Oral Daily          sodium chloride 100 mL/hr Last Rate: Stopped (04/24/18 2100)       Assessment/Plan   1. Syncnope- echo with normal LV systolic function and calcified mitral valve. Otherwise unremarkable. Carotid artery duplex normal. Plan to discharge with Zio patch.     2. Dementia    Ok to discharge from a cardiac standpoint. Will follow up on zio patch results. She should see Dr. Johnson in 4 weeks.     PRANEETH Cooley  [facility]  04/25/18  3:17 PM

## 2018-04-25 NOTE — PROGRESS NOTES
Discharge Planning Assessment  [facility]     Patient Name: Ann Marie Blanton  MRN: [id number]  Today's Date: 4/25/2018    Admit Date: 4/23/2018          Discharge Needs Assessment    No documentation.             Discharge Plan    No documentation.       Destination     No service coordination in this encounter.      Durable Medical Equipment     No service coordination in this encounter.      Dialysis/Infusion     No service coordination in this encounter.      Home Medical Care     No service coordination in this encounter.      Social Care     No service coordination in this encounter.                Demographic Summary    No documentation.           Functional Status    No documentation.           Psychosocial    No documentation.           Abuse/Neglect    No documentation.           Legal     Row Name 04/25/18 1015       Financial/Legal    Finance Comments CCP called medical records and no POA documents found in EPIC and old system. Spoke with daughter puneet and she will bring in a copy. jina kearney/ccp            Substance Abuse    No documentation.           Patient Forms    No documentation.         Malaika Foreman, RN

## 2018-04-25 NOTE — DISCHARGE SUMMARY
NAME: Ann Marie Blanton ADMIT: 2018   : 1933  PCP: PRANEETH Toth    MRN: [id number] LOS: 0 days   AGE/SEX: 85 y.o. female  ROOM: [facility]     Date of Admission:  2018  Date of Discharge:  2018    PCP: PRANEETH Toth    CHIEF COMPLAINT  Syncope      DISCHARGE DIAGNOSIS  Active Hospital Problems (** Indicates Principal Problem)    Diagnosis Date Noted   • **Vasovagal syncope [R55] 2018   • DNR (do not resuscitate) [Z66] 2018   • Acute cystitis without hematuria [N30.00] 2018   • Depression [F32.9] 2018   • Heart murmur [R01.1] 2018      Resolved Hospital Problems    Diagnosis Date Noted Date Resolved   No resolved problems to display.       SECONDARY DIAGNOSES  Past Medical History:   Diagnosis Date   • Anxiety    • Depression    • Eustachian tube dysfunction    • Eye exam normal    • Hyperlipidemia    • Hypokalemia    • Mild cognitive impairment    • Sciatica    • Seasonal allergies        CONSULTS   Dr. Johnson- Cardiology    HOSPITAL COURSE  Patient is a 85 y.o. female with history of mild cognitive impairment (no official diagnosis of dementia yet but suspected), presented with syncope. The episode was preceded by some nausea and sounded to be vasovagal. She actually had some likely food poisening by eating some old meat. Orthostatics negative. CT head, A/P in ER negative (some chronic cysts which can be followed by PCP) ER physician concerned about heart murmur so she was admitted.    Telemetry without any arrhythmia. ECHO was without any significant issue which would cause syncope or need to be treated. Carotid doppler was without any significant stenosis. She had been having nausea and back pain prior to admission (none here) but no dysuria so it was hard to say if symptomatic or not but will treat with short course for UTI and discharged on keflex based on cultures. She ambulated well with PT and they did not recommend  any rehab subacute rehab. Did discuss potential memory unit.TSH normal. B12 is normal but borderline could benefit from daily po. Patient will be discharged to the preferred disposition per family/patient. If they go home I will order PT/OT/RN with home health. Cardiology was going to order zio patch for outpatient and follow with [facility] in 4 weeks.       DIAGNOSTICS  ECHO 4/24/18  · Calculated EF = 57%. Estimated EF was in agreement with the calculated EF. Estimated EF = 57%. Normal left ventricular cavity size and wall thickness noted. All left ventricular wall segments contract normally. Left ventricular diastolic dysfunction is noted (grade I a w/high LAP) consistent with impaired relaxation.  · There is moderate thickening of the aortic valve. Trace aortic valve regurgitation is present.  · The mitral valve is abnormal in structure. Severe MAC is present. Mild mitral valve regurgitation is present.  · Mild tricuspid valve regurgitation is present. Estimated right ventricular systolic pressure from tricuspid regurgitation is normal (<35 mmHg). Calculated right ventricular systolic pressure from tricuspid regurgitation is 23 mmHg.    Duplex Carotid Ultrasound CAR [[id number]] Reviewed: 04/24/18 1649   Order Status: Completed      Narrative:     · Proximal right internal carotid artery plaque without significant   stenosis.  · Proximal left internal carotid artery plaque without significant   stenosis.     CT Abdomen Pelvis Without Contrast [[id number]] Adolfo as Reviewed   Order Status: Completed Collected: 04/23/18 1457    Updated: 04/24/18 1116   Narrative:     CT ABDOMEN AND PELVIS WITHOUT IV CONTRAST     HISTORY: 85-year-old female with abdominal pain and nausea.     TECHNIQUE: Radiation dose reduction techniques were utilized, including  automated exposure control and exposure modulation based on body size.   3 mm images were obtained through the abdomen and pelvis without the  administration of  IV contrast. Compared with previous CT from  02/04/2008.     FINDINGS: There are several hepatic cysts with the largest in the right  hepatic lobe measuring 3.4 cm, previously 2.8 cm. Noncontrasted  gallbladder, spleen, pancreas, adrenals, and kidneys appear  unremarkable. There is a 1 cm right renal cyst. No definite acute  abnormality is seen. There is moderately extensive sigmoid  diverticulosis without evidence for diverticulitis. The uterus is  surgically absent. Adnexa appear unremarkable. There are moderately  extensive abdominal aortic atherosclerotic changes without aneurysmal  dilatation.      Impression:     No acute abnormality is seen.     Discussed with Dr. Edmond.     This report was finalized on 4/24/2018 11:12 AM by Dr. Milly Hampton MD.      CT Head Without Contrast [[id number]] Adolfo as Reviewed   Order Status: Completed Collected: 04/23/18 1523    Updated: 04/24/18 0903   Narrative:     EMERGENCY NONCONTRAST HEAD CT 04/23/2018     CLINICAL HISTORY: Vertigo, syncope.     TECHNIQUE: Spiral CT images were obtained from the base of the skull to  the vertex without intravenous contrast. Images were reformatted and are  submitted in 3 mm thick axial CT sections with brain algorithm.     COMPARISON: There are new prior head CTs for comparison.     FINDINGS: There is some mild low-density in the periventricular white  matter consistent with mild small vessel disease. The remainder of the  brain parenchyma is normal in attenuation. The ventricles are normal in  size. I see no mass effect, no midline shift, and new extra-axial fluid  collections are identified. There is no evidence of acute intracranial  hemorrhage. There is mild cerebral atrophy most pronounced in the  temporal lobes. Paranasal sinuses, mastoid air cells and middle ear  cavities are clear.      Impression:     There is mild small vessel disease in the cerebral white  matter and mild cerebral atrophy most pronounced in the temporal  lobes.  The remainder of the head CT is unremarkable.      Vitamin B12 [[id number]] (Normal) Collected: 04/24/18 1001   Lab Status: Final result Specimen: Blood Updated: 04/24/18 1243    Vitamin B-12 322 pg/mL      TSH [[id number]] (Normal) Collected: 04/24/18 0542   Lab Status: Final result Specimen: Blood Updated: 04/24/18 0645    TSH 1.950 mIU/mL          PHYSICAL EXAM  Objective     Alert  nad  Trace murmur  Soft, nt  No edema  Pleasant but poor memory    CONDITION ON DISCHARGE  Stable.      DISCHARGE DISPOSITION   Home or Self Care      DISCHARGE MEDICATIONS   Ann Marie Blanton   Home Medication Instructions ELODIA:[id number]    Printed on:04/25/18 1456   Medication Information                      acetaminophen (TYLENOL) 325 MG tablet  Take 2 tablets by mouth Every 4 (Four) Hours As Needed for Mild Pain .             aspirin 81 MG EC tablet  Take 81 mg by mouth Daily.             cephalexin (KEFLEX) 500 MG capsule  Take 1 capsule by mouth 3 (Three) Times a Day for 12 doses.             simvastatin (ZOCOR) 40 MG tablet  Take 1 tablet by mouth Every Night.             vitamin B-12 (CYANOCOBALAMIN) 1000 MCG tablet  Take 1 tablet by mouth Daily.             Vortioxetine HBr (TRINTELLIX) 10 MG tablet  Take 10 mg by mouth Daily.               Diet Instructions     Regular diet, no restrictions                 Activity Instructions     As tolerated no restrictions              No future appointments.  Additional Instructions for the Follow-ups that You Need to Schedule     Discharge Follow-up with PCP    As directed      Follow Up Details:  1-2 weeks         Referral to Home Health    As directed      PRANEETH Toth    Order Comments:  PRANEETH Toth     Face to Face Visit Date:  4/25/2018    Follow-up Provider for Plan of Care?:  I treated the patient in an acute care facility and will not continue treatment after discharge.    Follow-up Provider:  VERÓNICA GREENFIELD [[id number]]     Reason/Clinical Findings:  syncope, cognitive impairment    Describe mobility limitations that make leaving home difficult:  syncope, cognitive impairment    Nursing/Therapeutic Services Requested:  Skilled Nursing Physical Therapy Occupational Therapy    Skilled nursing orders:  Medication education Mental health    PT orders:  Therapeutic exercise Strengthening Home safety assessment    Occupational orders:  Strengthening Fine motor Activities of daily living Cognition Home safety assessment            Contact information for follow-up providers     PRANEETH Toth .    Specialty:  Family Medicine  Why:  1-2 weeks  Contact information:  [address] [facility]  [address]  [facility] [id number] [phone]             Truong Matthew DPM .    Specialty:  Podiatry  Why:  1-2 weeks  Contact information:  [address] [facility] [id number] [phone]                   Contact information for after-discharge care     Home Medical Care     [facility] .    Specialty:  Home Health Services  Contact information:  [address]  [facility] [address] [phone]                             TEST  RESULTS PENDING AT DISCHARGE         Casper Andrea MD  [facility]  04/25/18  4:07 PM      It was a pleasure taking care of this patient while in the hospital.

## 2018-04-25 NOTE — PLAN OF CARE
Problem: Patient Care Overview  Goal: Plan of Care Review   04/25/18 0513   Coping/Psychosocial   Plan of Care Reviewed With patient   Plan of Care Review   Progress no change   OTHER   Outcome Summary patient confused overnight, oriented only to self. despite continuous reorientation to environment, patient very restless until daughter able to come be at bedside overnight. No falls. Continue IV fluids. Possible d/c once safe d/c plan established for home.      Goal: Individualization and Mutuality  Outcome: Ongoing (interventions implemented as appropriate)    Goal: Discharge Needs Assessment  Outcome: Ongoing (interventions implemented as appropriate)

## 2018-04-25 NOTE — PROGRESS NOTES
Continued Stay Note  [facility]     Patient Name: Ann Marie Blanton  MRN: [id number]  Today's Date: 4/25/2018    Admit Date: 4/23/2018          Discharge Plan     Row Name 04/25/18 1449       Plan    Plan Home with [facility]     Patient/Family in Agreement with Plan yes    Plan Comments Spoke with patients daughter discussed dc planning, and she stated plan is Home with HH after her discussion with Dr. Andrea. CCP reviewed HH providers and she did not have preference and chose [facility]. Pts daughter sent over POA papers and CCP scanned into [employer]. Daughter will transport home and they will arrange additional care if needed once they get home. CCP spoke with [facility] for referral and dc today. Updated RN Nima Mullen RN/CCP              Discharge Codes    No documentation.       Expected Discharge Date and Time     Expected Discharge Date Expected Discharge Time    Apr 25, 2018             Malaika Foreman, RN

## 2018-04-26 NOTE — PROGRESS NOTES
Case Management Discharge Note    Final Note: Home with [facility] and family support    Destination     No service coordination in this encounter.      Durable Medical Equipment     No service coordination in this encounter.      Dialysis/Infusion     No service coordination in this encounter.      Home Medical Care - Selection Complete     Service Request Status Selected Specialties Address Phone Number Fax Number    [facility] [place] Selected Home Health Services [address] [address] [phone]      Social Care     No service coordination in this encounter.        Other: Other (family)    Final Discharge Disposition Code: 06 - home with home health care

## 2018-05-27 PROCEDURE — 0298T HOLTER MONITOR - 72 HOUR UP TO 21 DAY: CPT | Performed by: INTERNAL MEDICINE

## 2018-06-25 DIAGNOSIS — F33.9 EPISODE OF RECURRENT MAJOR DEPRESSIVE DISORDER, UNSPECIFIED DEPRESSION EPISODE SEVERITY (HCC): ICD-10-CM

## 2018-06-25 RX ORDER — VORTIOXETINE 10 MG/1
TABLET, FILM COATED ORAL
Qty: 30 TABLET | Refills: 0 | Status: SHIPPED | OUTPATIENT
Start: 2018-06-25 | End: 2018-07-28 | Stop reason: SDUPTHER

## 2018-07-14 ENCOUNTER — HOSPITAL ENCOUNTER (EMERGENCY)
Facility: HOSPITAL | Age: 83
Discharge: HOME | End: 2018-07-14
Attending: FAMILY MEDICINE | Admitting: FAMILY MEDICINE

## 2018-07-14 VITALS
OXYGEN SATURATION: 98 % | WEIGHT: 130 LBS | TEMPERATURE: 97.6 F | HEIGHT: 66 IN | RESPIRATION RATE: 18 BRPM | BODY MASS INDEX: 20.89 KG/M2 | HEART RATE: 68 BPM | DIASTOLIC BLOOD PRESSURE: 73 MMHG | SYSTOLIC BLOOD PRESSURE: 142 MMHG

## 2018-07-14 DIAGNOSIS — F60.3 BORDERLINE PERSONALITY DISORDER (HCC): ICD-10-CM

## 2018-07-14 DIAGNOSIS — F41.0 PANIC ATTACK: Primary | ICD-10-CM

## 2018-07-14 LAB
ALBUMIN SERPL-MCNC: 4.3 G/DL (ref 3.5–5.2)
ALBUMIN/GLOB SERPL: 1.7 G/DL
ALP SERPL-CCNC: 56 U/L (ref 39–117)
ALT SERPL W P-5'-P-CCNC: 10 U/L (ref 1–33)
ANION GAP SERPL CALCULATED.3IONS-SCNC: 11.4 MMOL/L
AST SERPL-CCNC: 10 U/L (ref 1–32)
BASOPHILS # BLD AUTO: 0.02 10*3/MM3 (ref 0–0.2)
BASOPHILS NFR BLD AUTO: 0.3 % (ref 0–1.5)
BILIRUB SERPL-MCNC: 0.4 MG/DL (ref 0.1–1.2)
BUN BLD-MCNC: 18 MG/DL (ref 8–23)
BUN/CREAT SERPL: 24 (ref 7–25)
CALCIUM SPEC-SCNC: 9.2 MG/DL (ref 8.6–10.5)
CHLORIDE SERPL-SCNC: 100 MMOL/L (ref 98–107)
CO2 SERPL-SCNC: 29.6 MMOL/L (ref 22–29)
CREAT BLD-MCNC: 0.75 MG/DL (ref 0.57–1)
DEPRECATED RDW RBC AUTO: 45.5 FL (ref 37–54)
EOSINOPHIL # BLD AUTO: 0.05 10*3/MM3 (ref 0–0.7)
EOSINOPHIL NFR BLD AUTO: 0.7 % (ref 0.3–6.2)
ERYTHROCYTE [DISTWIDTH] IN BLOOD BY AUTOMATED COUNT: 14.5 % (ref 11.7–13)
GFR SERPL CREATININE-BSD FRML MDRD: 73 ML/MIN/1.73
GLOBULIN UR ELPH-MCNC: 2.6 GM/DL
GLUCOSE BLD-MCNC: 120 MG/DL (ref 65–99)
HCT VFR BLD AUTO: 43.8 % (ref 35.6–45.5)
HGB BLD-MCNC: 14.1 G/DL (ref 11.9–15.5)
IMM GRANULOCYTES # BLD: 0 10*3/MM3 (ref 0–0.03)
IMM GRANULOCYTES NFR BLD: 0 % (ref 0–0.5)
LYMPHOCYTES # BLD AUTO: 1.27 10*3/MM3 (ref 0.9–4.8)
LYMPHOCYTES NFR BLD AUTO: 19 % (ref 19.6–45.3)
MCH RBC QN AUTO: 27.5 PG (ref 26.9–32)
MCHC RBC AUTO-ENTMCNC: 32.2 G/DL (ref 32.4–36.3)
MCV RBC AUTO: 85.4 FL (ref 80.5–98.2)
MONOCYTES # BLD AUTO: 0.43 10*3/MM3 (ref 0.2–1.2)
MONOCYTES NFR BLD AUTO: 6.4 % (ref 5–12)
NEUTROPHILS # BLD AUTO: 4.93 10*3/MM3 (ref 1.9–8.1)
NEUTROPHILS NFR BLD AUTO: 73.6 % (ref 42.7–76)
PLATELET # BLD AUTO: 194 10*3/MM3 (ref 140–500)
PMV BLD AUTO: 9.3 FL (ref 6–12)
POTASSIUM BLD-SCNC: 4.1 MMOL/L (ref 3.5–5.2)
PROT SERPL-MCNC: 6.9 G/DL (ref 6–8.5)
RBC # BLD AUTO: 5.13 10*6/MM3 (ref 3.9–5.2)
SODIUM BLD-SCNC: 141 MMOL/L (ref 136–145)
TROPONIN T SERPL-MCNC: <0.01 NG/ML (ref 0–0.03)
WBC NRBC COR # BLD: 6.7 10*3/MM3 (ref 4.5–10.7)

## 2018-07-14 PROCEDURE — 85025 COMPLETE CBC W/AUTO DIFF WBC: CPT | Performed by: FAMILY MEDICINE

## 2018-07-14 PROCEDURE — 80053 COMPREHEN METABOLIC PANEL: CPT | Performed by: FAMILY MEDICINE

## 2018-07-14 PROCEDURE — 93010 ELECTROCARDIOGRAM REPORT: CPT | Performed by: INTERNAL MEDICINE

## 2018-07-14 PROCEDURE — 84484 ASSAY OF TROPONIN QUANT: CPT | Performed by: FAMILY MEDICINE

## 2018-07-14 PROCEDURE — 36415 COLL VENOUS BLD VENIPUNCTURE: CPT

## 2018-07-14 PROCEDURE — 99283 EMERGENCY DEPT VISIT LOW MDM: CPT

## 2018-07-14 PROCEDURE — 93005 ELECTROCARDIOGRAM TRACING: CPT | Performed by: FAMILY MEDICINE

## 2018-07-14 NOTE — ED TRIAGE NOTES
Patient had panic attack today while at home, her daughter called EMS because the patient had called her and she was breathing heavily and states that she worried her. Patient is awake and alert now states she has a little anxiety.

## 2018-07-15 NOTE — DISCHARGE INSTRUCTIONS
Follow up with your medical doctor and possibly a Counsellor is important.    Return if you worsen or develop new symptoms.

## 2018-07-15 NOTE — ED PROVIDER NOTES
" [facility] EMERGENCY DEPARTMENT ENCOUNTER    CHIEF COMPLAINT  Chief Complaint: anxiety  History given by: patient, daughter report to EMS  History limited by: poor historian.  [facility] Room Number: 39/39  PMD: PRANEETH Toth      HPI:  Pt is a 85 y.o. female who presents to the ED via EMS from home. Pt advised that her daughter called EMS because pt was \"breathing heavy\" while on the phone with her. Pt does not remember the incident and is unsure what happened. Denies HA, CP, SOA, fever, and nausea.     Onset: sudden  Duration: pta  Severity: moderate  Associated symptoms: none  Previous treatment: pt has hx of syncope    PAST MEDICAL HISTORY  Active Ambulatory Problems     Diagnosis Date Noted   • Hyperlipidemia 10/28/2015   • Depression 10/28/2015   • Anxiety 10/28/2015   • Osteoarthritis of both knees 10/28/2015   • Abrasion of right side of back 08/23/2016   • ERRONEOUS ENCOUNTER--DISREGARD 05/16/2017   • Vasovagal syncope 04/23/2018   • Dementia 04/23/2018   • Depression 04/23/2018   • Heart murmur 04/23/2018   • DNR (do not resuscitate) 04/24/2018   • Acute cystitis without hematuria 04/24/2018     Resolved Ambulatory Problems     Diagnosis Date Noted   • No Resolved Ambulatory Problems     Past Medical History:   Diagnosis Date   • Anxiety    • Dementia    • Depression    • Eustachian tube dysfunction    • Eye exam normal 2013   • Grief    • Hyperlipidemia    • Hypokalemia    • Mild cognitive impairment    • Sciatica    • Seasonal allergies        PAST SURGICAL HISTORY  Past Surgical History:   Procedure Laterality Date   • APPENDECTOMY     • EYE SURGERY Left 06/01/2015    Dr. Saldana   • HYSTERECTOMY     • KNEE SURGERY  2010    torn meniscus   • TONSILLECTOMY         FAMILY HISTORY  Family History   Problem Relation Age of Onset   • Kidney disease Mother    • Nephrolithiasis Mother    • Diabetes Mother    • Heart disease Father    • Diabetes Other    • Breast cancer Sister    • Thyroid disease Brother  "       SOCIAL HISTORY  Social History     Social History   • Marital status:      Spouse name: N/A   • Number of children: N/A   • Years of education: N/A     Occupational History   • Not on file.     Social History Main Topics   • Smoking status: Never Smoker   • Smokeless tobacco: Never Used   • Alcohol use No   • Drug use: No   • Sexual activity: Defer     Other Topics Concern   • Not on file     Social History Narrative   • No narrative on file       ALLERGIES  Sulfa antibiotics    REVIEW OF SYSTEMS  Review of Systems   Unable to perform ROS: Other (poor historian)       PHYSICAL EXAM  ED Triage Vitals   Temp Heart Rate Resp BP SpO2   07/14/18 1951 07/14/18 1940 07/14/18 1940 07/14/18 1940 07/14/18 1940   97.6 °F (36.4 °C) 67 18 122/62 98 %      Temp src Heart Rate Source Patient Position BP Location FiO2 (%)   -- -- -- -- --              Physical Exam   Constitutional: No distress.   HENT:   Head: Normocephalic and atraumatic.   Eyes: EOM are normal. Pupils are equal, round, and reactive to light.   Neck: Normal range of motion. Neck supple.   Cardiovascular: Normal rate, regular rhythm and normal heart sounds.    Pulmonary/Chest: Effort normal and breath sounds normal. No respiratory distress.   Abdominal: Soft. There is no tenderness. There is no rebound and no guarding.   Musculoskeletal: Normal range of motion. She exhibits no edema.   Neurological: She is alert. She has normal sensation, normal strength and intact cranial nerves. She has a normal Straight Leg Raise Test and a normal Finger-Nose-Finger Test.   No neurological deficits at this time. Pt is disoriented to month.   Skin: Skin is warm and dry. No rash noted.   Psychiatric: Mood and affect normal.   Nursing note and vitals reviewed.      LAB RESULTS  Lab Results (last 24 hours)     Procedure Component Value Units Date/Time    CBC & Differential [[id number]] Collected:  07/14/18 2104    Specimen:  Blood Updated:  07/14/18 2116    Narrative:        The following orders were created for panel order CBC & Differential.  Procedure                               Abnormality         Status                     ---------                               -----------         ------                     CBC Auto Differential[[id number]]        Abnormal            Final result                 Please view results for these tests on the individual orders.    Comprehensive Metabolic Panel [[id number]]  (Abnormal) Collected:  07/14/18 2104    Specimen:  Blood Updated:  07/14/18 2137     Glucose 120 (H) mg/dL      BUN 18 mg/dL      Creatinine 0.75 mg/dL      Sodium 141 mmol/L      Potassium 4.1 mmol/L      Chloride 100 mmol/L      CO2 29.6 (H) mmol/L      Calcium 9.2 mg/dL      Total Protein 6.9 g/dL      Albumin 4.30 g/dL      ALT (SGPT) 10 U/L      AST (SGOT) 10 U/L      Alkaline Phosphatase 56 U/L      Total Bilirubin 0.4 mg/dL      eGFR Non African Amer 73 mL/min/1.73      Globulin 2.6 gm/dL      A/G Ratio 1.7 g/dL      BUN/Creatinine Ratio 24.0     Anion Gap 11.4 mmol/L     Narrative:       The MDRD GFR formula is only valid for adults with stable renal function between ages 18 and 70.    Troponin [[id number]]  (Normal) Collected:  07/14/18 2104    Specimen:  Blood Updated:  07/14/18 2137     Troponin T <0.010 ng/mL     Narrative:       Troponin T Reference Ranges:  Less than 0.03 ng/mL:    Negative for AMI  0.03 to 0.09 ng/mL:      Indeterminant for AMI  Greater than 0.09 ng/mL: Positive for AMI    CBC Auto Differential [[id number]]  (Abnormal) Collected:  07/14/18 2104    Specimen:  Blood Updated:  07/14/18 2116     WBC 6.70 10*3/mm3      RBC 5.13 10*6/mm3      Hemoglobin 14.1 g/dL      Hematocrit 43.8 %      MCV 85.4 fL      MCH 27.5 pg      MCHC 32.2 (L) g/dL      RDW 14.5 (H) %      RDW-SD 45.5 fl      MPV 9.3 fL      Platelets 194 10*3/mm3      Neutrophil % 73.6 %      Lymphocyte % 19.0 (L) %      Monocyte % 6.4 %      Eosinophil % 0.7 %      Basophil % 0.3 %       "Immature Grans % 0.0 %      Neutrophils, Absolute 4.93 10*3/mm3      Lymphocytes, Absolute 1.27 10*3/mm3      Monocytes, Absolute 0.43 10*3/mm3      Eosinophils, Absolute 0.05 10*3/mm3      Basophils, Absolute 0.02 10*3/mm3      Immature Grans, Absolute 0.00 10*3/mm3           I ordered the above labs and reviewed the results    PROCEDURES  Procedures  EKG           EKG time: 2053  Rhythm/Rate: NSR 69  P waves and MI: nml  QRS, axis: RAD   ST and T waves: nml    Interpreted Contemporaneously by me, independently viewed  unchanged compared to prior 04/23/18      PROGRESS AND CONSULTS     2124  BP- (!) 127/38 HR- 66 Temp- 97.6 °F (36.4 °C) O2 sat- 94%  Rechecked the patient who is in NAD and is resting comfortably. Pt's daughter-in-law and son is bedside. DIL advised she contacted the pt and \"she wasn't making any sense\" and was \"very weepy and very distraught\". She reports that the pt was very anxious. Spoke to daughter on the phone. She says the pt has a long hx of significant borderline personality disorder but has had been amazingly healthy. While on the phone w/ the pt, the pt decompensation with what seemed like panic. Pt's family is working on finding someone to stay w/ the pt tonight. Pt's daughter is also working on having the pt seen by someone for medication oversight. Per daughter, pt also has significant cognitive impairment.    Family has arranged for someone to stay with her and will try to arrange some counseling        MEDICAL DECISION MAKING  Results were reviewed/discussed with the patient and they were also made aware of online access. Pt also made aware that some labs, such as cultures, will not be resulted during ER visit and follow up with PMD is necessary.     MDM  Number of Diagnoses or Management Options     Amount and/or Complexity of Data Reviewed  Clinical lab tests: reviewed (Labs unremarkable)  Decide to obtain previous medical records or to obtain history from someone other than the " patient: yes (6 weeks ago seen and dx w/ syncope. Mild cognitive impairment with suspected dementia at that time. Cardiac echo negative, carotid doppler negative.)           DIAGNOSIS  Final diagnoses:   Panic attack   Borderline personality disorder       DISPOSITION  DISCHARGE    Patient discharged in stable condition.    Reviewed implications of results, diagnosis, meds, responsibility to follow up, warning signs and symptoms of possible worsening, potential complications and reasons to return to ER.    Patient/Family voiced understanding of above instructions.    Discussed plan for discharge, as there is no emergent indication for admission. Patient referred to primary care provider for BP management due to today's BP. Pt/family is agreeable and understands need for follow up and repeat testing.  Pt is aware that discharge does not mean that nothing is wrong but it indicates no emergency is present that requires admission and they must continue care with follow-up as given below or physician of their choice.     FOLLOW-UP  Letty Anthony, APRN  [address] [facility] [address]  [facility] [address]  [phone]    Schedule an appointment as soon as possible for a visit   As needed, For Follow up         Medication List      Changed    TRINTELLIX 10 MG tablet  Generic drug:  Vortioxetine HBr  TAKE ONE TABLET BY MOUTH DAILY  What changed:  Another medication with the same name was removed. Continue   taking this medication, and follow the directions you see here.          Latest Documented Vital Signs:  As of 10:09 PM  BP- (!) 127/38 HR- 66 Temp- 97.6 °F (36.4 °C) O2 sat- 94%    --  Documentation assistance provided by prakash Ibarra for Dr. Peralta.  Information recorded by the scribe was done at my direction and has been verified and validated by me.     Sol Ibarra  07/14/18 1601       Earl Peralta MD  07/14/18 9609

## 2018-07-18 ENCOUNTER — TELEPHONE (OUTPATIENT)
Dept: SOCIAL WORK | Facility: HOSPITAL | Age: 83
End: 2018-07-18

## 2018-07-28 DIAGNOSIS — F33.9 EPISODE OF RECURRENT MAJOR DEPRESSIVE DISORDER, UNSPECIFIED DEPRESSION EPISODE SEVERITY (HCC): ICD-10-CM

## 2018-07-31 RX ORDER — VORTIOXETINE 10 MG/1
TABLET, FILM COATED ORAL
Qty: 30 TABLET | Refills: 0 | Status: SHIPPED | OUTPATIENT
Start: 2018-07-31 | End: 2018-08-08 | Stop reason: SDUPTHER

## 2018-08-08 ENCOUNTER — OFFICE VISIT (OUTPATIENT)
Dept: FAMILY MEDICINE CLINIC | Facility: CLINIC | Age: 83
End: 2018-08-08

## 2018-08-08 VITALS
SYSTOLIC BLOOD PRESSURE: 119 MMHG | HEIGHT: 66 IN | RESPIRATION RATE: 16 BRPM | OXYGEN SATURATION: 99 % | WEIGHT: 139 LBS | HEART RATE: 75 BPM | BODY MASS INDEX: 22.34 KG/M2 | DIASTOLIC BLOOD PRESSURE: 69 MMHG | TEMPERATURE: 97.8 F

## 2018-08-08 DIAGNOSIS — F33.9 EPISODE OF RECURRENT MAJOR DEPRESSIVE DISORDER, UNSPECIFIED DEPRESSION EPISODE SEVERITY (HCC): ICD-10-CM

## 2018-08-08 PROCEDURE — 99213 OFFICE O/P EST LOW 20 MIN: CPT | Performed by: NURSE PRACTITIONER

## 2018-08-08 NOTE — PROGRESS NOTES
Subjective   Ann Marie Blanton is a 85 y.o. female.     History of Present Illness   Ann Marie Blanton 85 y.o. female who presents today accompanied by her daughter for routine follow up check and medication refills.  she has a history of   Patient Active Problem List   Diagnosis   • Hyperlipidemia   • Depression   • Anxiety   • Osteoarthritis of both knees   • Abrasion of right side of back   • ERRONEOUS ENCOUNTER--DISREGARD   • Vasovagal syncope   • Dementia   • Depression   • Heart murmur   • DNR (do not resuscitate)   • Acute cystitis without hematuria   .  Since the last visit, she has overall felt well.  She has anxiety and depression that are well controlled on trintellix.  she has been compliant with current medications have reviewed them.  The patient denies medication side effects.    Results for orders placed or performed during the hospital encounter of 07/14/18   Comprehensive Metabolic Panel   Result Value Ref Range    Glucose 120 (H) 65 - 99 mg/dL    BUN 18 8 - 23 mg/dL    Creatinine 0.75 0.57 - 1.00 mg/dL    Sodium 141 136 - 145 mmol/L    Potassium 4.1 3.5 - 5.2 mmol/L    Chloride 100 98 - 107 mmol/L    CO2 29.6 (H) 22.0 - 29.0 mmol/L    Calcium 9.2 8.6 - 10.5 mg/dL    Total Protein 6.9 6.0 - 8.5 g/dL    Albumin 4.30 3.50 - 5.20 g/dL    ALT (SGPT) 10 1 - 33 U/L    AST (SGOT) 10 1 - 32 U/L    Alkaline Phosphatase 56 39 - 117 U/L    Total Bilirubin 0.4 0.1 - 1.2 mg/dL    eGFR Non African Amer 73 >60 mL/min/1.73    Globulin 2.6 gm/dL    A/G Ratio 1.7 g/dL    BUN/Creatinine Ratio 24.0 7.0 - 25.0    Anion Gap 11.4 mmol/L   Troponin   Result Value Ref Range    Troponin T <0.010 0.000 - 0.030 ng/mL   CBC Auto Differential   Result Value Ref Range    WBC 6.70 4.50 - 10.70 10*3/mm3    RBC 5.13 3.90 - 5.20 10*6/mm3    Hemoglobin 14.1 11.9 - 15.5 g/dL    Hematocrit 43.8 35.6 - 45.5 %    MCV 85.4 80.5 - 98.2 fL    MCH 27.5 26.9 - 32.0 pg    MCHC 32.2 (L) 32.4 - 36.3 g/dL    RDW 14.5 (H) 11.7 - 13.0 %    RDW-SD 45.5  37.0 - 54.0 fl    MPV 9.3 6.0 - 12.0 fL    Platelets 194 140 - 500 10*3/mm3    Neutrophil % 73.6 42.7 - 76.0 %    Lymphocyte % 19.0 (L) 19.6 - 45.3 %    Monocyte % 6.4 5.0 - 12.0 %    Eosinophil % 0.7 0.3 - 6.2 %    Basophil % 0.3 0.0 - 1.5 %    Immature Grans % 0.0 0.0 - 0.5 %    Neutrophils, Absolute 4.93 1.90 - 8.10 10*3/mm3    Lymphocytes, Absolute 1.27 0.90 - 4.80 10*3/mm3    Monocytes, Absolute 0.43 0.20 - 1.20 10*3/mm3    Eosinophils, Absolute 0.05 0.00 - 0.70 10*3/mm3    Basophils, Absolute 0.02 0.00 - 0.20 10*3/mm3    Immature Grans, Absolute 0.00 0.00 - 0.03 10*3/mm3     Patient stopped atorvastatin as she did not want to take it anymore.     Patient also needs annual paperwork filled out for [facility]   The following portions of the patient's history were reviewed and updated as appropriate: allergies, current medications, past family history, past medical history, past social history, past surgical history and problem list.    Review of Systems   Constitutional: Negative for unexpected weight change.   Respiratory: Negative for shortness of breath.    Cardiovascular: Negative for chest pain and palpitations.   Psychiatric/Behavioral: Negative for agitation, behavioral problems, decreased concentration, dysphoric mood, self-injury, sleep disturbance and suicidal ideas. The patient is not nervous/anxious.        Objective   Physical Exam   Constitutional: She appears well-developed and well-nourished. No distress.   HENT:   Head: Normocephalic and atraumatic. Hair is normal.   Right Ear: Hearing, tympanic membrane, external ear and ear canal normal. No drainage. No decreased hearing is noted.   Left Ear: Hearing, tympanic membrane, external ear and ear canal normal. No decreased hearing is noted.   Nose: No nasal deformity.   Mouth/Throat: Oropharynx is clear and moist.   Eyes: Pupils are equal, round, and reactive to light. Conjunctivae, EOM and lids are normal. Right eye exhibits no discharge.  Left eye exhibits no discharge.   Neck: Normal range of motion. Neck supple. No JVD present. No tracheal deviation present. No thyromegaly present.   Cardiovascular: Normal rate, regular rhythm, normal heart sounds, intact distal pulses and normal pulses.    Pulmonary/Chest: Effort normal and breath sounds normal. No respiratory distress. She has no wheezes. She has no rales.   Musculoskeletal: Normal range of motion. She exhibits no edema, tenderness or deformity.   Lymphadenopathy:     She has no cervical adenopathy.   Neurological: She is alert. No cranial nerve deficit.   Skin: Skin is warm, dry and intact. No rash noted. No erythema.   Psychiatric: She has a normal mood and affect. Her speech is normal and behavior is normal. Cognition and memory are normal.   Nursing note and vitals reviewed.      Assessment/Plan   Ann Marie was seen today for anxiety and depression.    Diagnoses and all orders for this visit:    Episode of recurrent major depressive disorder, unspecified depression episode severity (CMS/HCC)  -     Vortioxetine HBr (TRINTELLIX) 10 MG tablet; Take 10 mg by mouth Daily.

## 2019-08-11 NOTE — ED NOTES
"Pt states \"I think I am ok now.  I just had a spell because of my \"     Michael Curtis RN  07/14/18 2000    "
DISCHARGE

## 2020-01-01 ENCOUNTER — APPOINTMENT (OUTPATIENT)
Dept: CT IMAGING | Facility: HOSPITAL | Age: 85
End: 2020-01-01

## 2020-01-01 ENCOUNTER — APPOINTMENT (OUTPATIENT)
Dept: GENERAL RADIOLOGY | Facility: HOSPITAL | Age: 85
End: 2020-01-01

## 2020-01-01 ENCOUNTER — HOSPITAL ENCOUNTER (INPATIENT)
Facility: HOSPITAL | Age: 85
LOS: 3 days | End: 2020-11-23
Attending: INTERNAL MEDICINE | Admitting: INTERNAL MEDICINE

## 2020-01-01 ENCOUNTER — HOSPITAL ENCOUNTER (EMERGENCY)
Facility: HOSPITAL | Age: 85
Discharge: NURSING, REHAB OR LONG-TERM CARE | End: 2020-11-14
Attending: EMERGENCY MEDICINE | Admitting: EMERGENCY MEDICINE

## 2020-01-01 ENCOUNTER — HOSPITAL ENCOUNTER (EMERGENCY)
Facility: HOSPITAL | Age: 85
Discharge: NURSING, REHAB OR LONG-TERM CARE | End: 2020-11-10
Attending: EMERGENCY MEDICINE | Admitting: EMERGENCY MEDICINE

## 2020-01-01 ENCOUNTER — HOSPITAL ENCOUNTER (INPATIENT)
Facility: HOSPITAL | Age: 85
LOS: 5 days | End: 2020-11-20
Attending: EMERGENCY MEDICINE | Admitting: INTERNAL MEDICINE

## 2020-01-01 VITALS
OXYGEN SATURATION: 87 % | WEIGHT: 120 LBS | DIASTOLIC BLOOD PRESSURE: 70 MMHG | HEART RATE: 123 BPM | TEMPERATURE: 97.3 F | BODY MASS INDEX: 20.49 KG/M2 | HEIGHT: 64 IN | SYSTOLIC BLOOD PRESSURE: 146 MMHG | RESPIRATION RATE: 24 BRPM

## 2020-01-01 VITALS
BODY MASS INDEX: 27.48 KG/M2 | HEIGHT: 60 IN | WEIGHT: 140 LBS | DIASTOLIC BLOOD PRESSURE: 82 MMHG | RESPIRATION RATE: 22 BRPM | SYSTOLIC BLOOD PRESSURE: 141 MMHG | TEMPERATURE: 97.3 F | HEART RATE: 110 BPM | OXYGEN SATURATION: 96 %

## 2020-01-01 VITALS
OXYGEN SATURATION: 96 % | HEART RATE: 101 BPM | SYSTOLIC BLOOD PRESSURE: 98 MMHG | DIASTOLIC BLOOD PRESSURE: 54 MMHG | TEMPERATURE: 98.5 F | RESPIRATION RATE: 20 BRPM

## 2020-01-01 VITALS
TEMPERATURE: 97.8 F | BODY MASS INDEX: 27.34 KG/M2 | OXYGEN SATURATION: 95 % | SYSTOLIC BLOOD PRESSURE: 141 MMHG | RESPIRATION RATE: 16 BRPM | HEART RATE: 99 BPM | DIASTOLIC BLOOD PRESSURE: 96 MMHG | HEIGHT: 60 IN

## 2020-01-01 DIAGNOSIS — R41.82 ALTERED MENTAL STATUS, UNSPECIFIED ALTERED MENTAL STATUS TYPE: Primary | ICD-10-CM

## 2020-01-01 DIAGNOSIS — S00.93XA CONTUSION OF HEAD, UNSPECIFIED PART OF HEAD, INITIAL ENCOUNTER: Primary | ICD-10-CM

## 2020-01-01 DIAGNOSIS — S16.1XXA STRAIN OF NECK MUSCLE, INITIAL ENCOUNTER: ICD-10-CM

## 2020-01-01 DIAGNOSIS — S09.90XA CLOSED HEAD INJURY, INITIAL ENCOUNTER: ICD-10-CM

## 2020-01-01 DIAGNOSIS — R31.9 URINARY TRACT INFECTION WITH HEMATURIA, SITE UNSPECIFIED: ICD-10-CM

## 2020-01-01 DIAGNOSIS — R65.21 SEPTIC SHOCK (HCC): ICD-10-CM

## 2020-01-01 DIAGNOSIS — E87.0 HYPERNATREMIA: ICD-10-CM

## 2020-01-01 DIAGNOSIS — S00.01XA ABRASION, SCALP W/O INFECTION: ICD-10-CM

## 2020-01-01 DIAGNOSIS — N17.9 AKI (ACUTE KIDNEY INJURY) (HCC): ICD-10-CM

## 2020-01-01 DIAGNOSIS — A41.9 SEPTIC SHOCK (HCC): ICD-10-CM

## 2020-01-01 DIAGNOSIS — N39.0 URINARY TRACT INFECTION WITH HEMATURIA, SITE UNSPECIFIED: ICD-10-CM
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
## 2020-01-01 LAB
ABO GROUP BLD: NORMAL
ALBUMIN SERPL-MCNC: 1.9 G/DL (ref 3.5–5.2)
ALBUMIN SERPL-MCNC: 1.9 G/DL (ref 3.5–5.2)
ALBUMIN SERPL-MCNC: 2 G/DL (ref 3.5–5.2)
ALBUMIN SERPL-MCNC: 2.1 G/DL (ref 3.5–5.2)
ALBUMIN SERPL-MCNC: 2.1 G/DL (ref 3.5–5.2)
ALBUMIN SERPL-MCNC: 2.5 G/DL (ref 3.5–5.2)
ALBUMIN SERPL-MCNC: 3.1 G/DL (ref 3.5–5.2)
ALBUMIN/GLOB SERPL: 0.6 G/DL
ALBUMIN/GLOB SERPL: 0.7 G/DL
ALBUMIN/GLOB SERPL: 0.9 G/DL
ALP SERPL-CCNC: 56 U/L (ref 39–117)
ALP SERPL-CCNC: 63 U/L (ref 39–117)
ALP SERPL-CCNC: 68 U/L (ref 39–117)
ALT SERPL W P-5'-P-CCNC: 12 U/L (ref 1–33)
ALT SERPL W P-5'-P-CCNC: 13 U/L (ref 1–33)
ALT SERPL W P-5'-P-CCNC: 24 U/L (ref 1–33)
AMPHET+METHAMPHET UR QL: NEGATIVE
ANION GAP SERPL CALCULATED.3IONS-SCNC: 10.1 MMOL/L (ref 5–15)
ANION GAP SERPL CALCULATED.3IONS-SCNC: 19 MMOL/L (ref 5–15)
ANION GAP SERPL CALCULATED.3IONS-SCNC: 6.3 MMOL/L (ref 5–15)
ANION GAP SERPL CALCULATED.3IONS-SCNC: 6.4 MMOL/L (ref 5–15)
ANION GAP SERPL CALCULATED.3IONS-SCNC: 6.8 MMOL/L (ref 5–15)
ANION GAP SERPL CALCULATED.3IONS-SCNC: 6.9 MMOL/L (ref 5–15)
ANION GAP SERPL CALCULATED.3IONS-SCNC: 7.7 MMOL/L (ref 5–15)
ANION GAP SERPL CALCULATED.3IONS-SCNC: 9.9 MMOL/L (ref 5–15)
ANISOCYTOSIS BLD QL: ABNORMAL
ARTERIAL PATENCY WRIST A: ABNORMAL
ARTERIAL PATENCY WRIST A: POSITIVE
AST SERPL-CCNC: 11 U/L (ref 1–32)
AST SERPL-CCNC: 16 U/L (ref 1–32)
AST SERPL-CCNC: 41 U/L (ref 1–32)
ATMOSPHERIC PRESS: 745.9 MMHG
ATMOSPHERIC PRESS: 756.7 MMHG
B PARAPERT DNA SPEC QL NAA+PROBE: NOT DETECTED
B PERT DNA SPEC QL NAA+PROBE: NOT DETECTED
BACTERIA BLD CULT: ABNORMAL
BACTERIA SPEC AEROBE CULT: ABNORMAL
BACTERIA SPEC AEROBE CULT: NORMAL
BACTERIA SPEC AEROBE CULT: NORMAL
BACTERIA UR QL AUTO: ABNORMAL /HPF
BARBITURATES UR QL SCN: NEGATIVE
BASE EXCESS BLDA CALC-SCNC: -0.5 MMOL/L (ref 0–2)
BASE EXCESS BLDA CALC-SCNC: -1.8 MMOL/L (ref 0–2)
BASOPHILS # BLD AUTO: 0.06 10*3/MM3 (ref 0–0.2)
BASOPHILS # BLD AUTO: 0.07 10*3/MM3 (ref 0–0.2)
BASOPHILS # BLD AUTO: 0.07 10*3/MM3 (ref 0–0.2)
BASOPHILS # BLD AUTO: 0.12 10*3/MM3 (ref 0–0.2)
BASOPHILS NFR BLD AUTO: 0.4 % (ref 0–1.5)
BDY SITE: ABNORMAL
BDY SITE: ABNORMAL
BENZODIAZ UR QL SCN: NEGATIVE
BILIRUB SERPL-MCNC: 0.3 MG/DL (ref 0–1.2)
BILIRUB SERPL-MCNC: 0.3 MG/DL (ref 0–1.2)
BILIRUB SERPL-MCNC: 0.4 MG/DL (ref 0–1.2)
BILIRUB UR QL STRIP: NEGATIVE
BLD GP AB SCN SERPL QL: NEGATIVE
BOTTLE TYPE: ABNORMAL
BUN SERPL-MCNC: 15 MG/DL (ref 8–23)
BUN SERPL-MCNC: 23 MG/DL (ref 8–23)
BUN SERPL-MCNC: 27 MG/DL (ref 8–23)
BUN SERPL-MCNC: 29 MG/DL (ref 8–23)
BUN SERPL-MCNC: 39 MG/DL (ref 8–23)
BUN SERPL-MCNC: 54 MG/DL (ref 8–23)
BUN SERPL-MCNC: 8 MG/DL (ref 8–23)
BUN SERPL-MCNC: 81 MG/DL (ref 8–23)
BUN/CREAT SERPL: 21.6 (ref 7–25)
BUN/CREAT SERPL: 26.5 (ref 7–25)
BUN/CREAT SERPL: 31.9 (ref 7–25)
BUN/CREAT SERPL: 32.4 (ref 7–25)
BUN/CREAT SERPL: 37.1 (ref 7–25)
BUN/CREAT SERPL: 48.3 (ref 7–25)
BUN/CREAT SERPL: 50.6 (ref 7–25)
BUN/CREAT SERPL: 52.4 (ref 7–25)
BURR CELLS BLD QL SMEAR: ABNORMAL
C PNEUM DNA NPH QL NAA+NON-PROBE: NOT DETECTED
CALCIUM SPEC-SCNC: 6.5 MG/DL (ref 8.6–10.5)
CALCIUM SPEC-SCNC: 7.2 MG/DL (ref 8.6–10.5)
CALCIUM SPEC-SCNC: 7.3 MG/DL (ref 8.6–10.5)
CALCIUM SPEC-SCNC: 7.4 MG/DL (ref 8.6–10.5)
CALCIUM SPEC-SCNC: 7.5 MG/DL (ref 8.6–10.5)
CALCIUM SPEC-SCNC: 7.6 MG/DL (ref 8.6–10.5)
CALCIUM SPEC-SCNC: 7.7 MG/DL (ref 8.6–10.5)
CALCIUM SPEC-SCNC: 9.2 MG/DL (ref 8.6–10.5)
CANNABINOIDS SERPL QL: NEGATIVE
CHLORIDE SERPL-SCNC: 115 MMOL/L (ref 98–107)
CHLORIDE SERPL-SCNC: 116 MMOL/L (ref 98–107)
CHLORIDE SERPL-SCNC: 117 MMOL/L (ref 98–107)
CHLORIDE SERPL-SCNC: 121 MMOL/L (ref 98–107)
CHLORIDE SERPL-SCNC: 126 MMOL/L (ref 98–107)
CHLORIDE SERPL-SCNC: 128 MMOL/L (ref 98–107)
CHLORIDE SERPL-SCNC: 129 MMOL/L (ref 98–107)
CHLORIDE SERPL-SCNC: 131 MMOL/L (ref 98–107)
CHLORIDE UR-SCNC: 170 MMOL/L
CK SERPL-CCNC: 23 U/L (ref 20–180)
CLARITY UR: ABNORMAL
CO2 SERPL-SCNC: 20.1 MMOL/L (ref 22–29)
CO2 SERPL-SCNC: 21.2 MMOL/L (ref 22–29)
CO2 SERPL-SCNC: 21.3 MMOL/L (ref 22–29)
CO2 SERPL-SCNC: 22.6 MMOL/L (ref 22–29)
CO2 SERPL-SCNC: 22.9 MMOL/L (ref 22–29)
CO2 SERPL-SCNC: 24.7 MMOL/L (ref 22–29)
CO2 SERPL-SCNC: 25 MMOL/L (ref 22–29)
CO2 SERPL-SCNC: 25.1 MMOL/L (ref 22–29)
COCAINE UR QL: NEGATIVE
COD CRY URNS QL: ABNORMAL /HPF
COLOR UR: ABNORMAL
CORTIS SERPL-MCNC: 12.17 MCG/DL
CREAT SERPL-MCNC: 0.37 MG/DL (ref 0.57–1)
CREAT SERPL-MCNC: 0.47 MG/DL (ref 0.57–1)
CREAT SERPL-MCNC: 0.6 MG/DL (ref 0.57–1)
CREAT SERPL-MCNC: 0.62 MG/DL (ref 0.57–1)
CREAT SERPL-MCNC: 0.77 MG/DL (ref 0.57–1)
CREAT SERPL-MCNC: 1.02 MG/DL (ref 0.57–1)
CREAT SERPL-MCNC: 1.03 MG/DL (ref 0.57–1)
CREAT SERPL-MCNC: 2.5 MG/DL (ref 0.57–1)
CREAT UR-MCNC: 22.3 MG/DL
D-LACTATE SERPL-SCNC: 2 MMOL/L (ref 0.5–2)
D-LACTATE SERPL-SCNC: 3.9 MMOL/L (ref 0.5–2)
DEPRECATED RDW RBC AUTO: 38.7 FL (ref 37–54)
DEPRECATED RDW RBC AUTO: 39.2 FL (ref 37–54)
DEPRECATED RDW RBC AUTO: 39.6 FL (ref 37–54)
DEPRECATED RDW RBC AUTO: 40 FL (ref 37–54)
DEPRECATED RDW RBC AUTO: 41.2 FL (ref 37–54)
DEPRECATED RDW RBC AUTO: 41.2 FL (ref 37–54)
EOSINOPHIL # BLD AUTO: 0.01 10*3/MM3 (ref 0–0.4)
EOSINOPHIL # BLD AUTO: 0.2 10*3/MM3 (ref 0–0.4)
EOSINOPHIL # BLD AUTO: 0.29 10*3/MM3 (ref 0–0.4)
EOSINOPHIL # BLD AUTO: 0.43 10*3/MM3 (ref 0–0.4)
EOSINOPHIL NFR BLD AUTO: 0 % (ref 0.3–6.2)
EOSINOPHIL NFR BLD AUTO: 1.1 % (ref 0.3–6.2)
EOSINOPHIL NFR BLD AUTO: 1.7 % (ref 0.3–6.2)
EOSINOPHIL NFR BLD AUTO: 2.7 % (ref 0.3–6.2)
ERYTHROCYTE [DISTWIDTH] IN BLOOD BY AUTOMATED COUNT: 14 % (ref 12.3–15.4)
ERYTHROCYTE [DISTWIDTH] IN BLOOD BY AUTOMATED COUNT: 14.1 % (ref 12.3–15.4)
ERYTHROCYTE [DISTWIDTH] IN BLOOD BY AUTOMATED COUNT: 14.3 % (ref 12.3–15.4)
ERYTHROCYTE [DISTWIDTH] IN BLOOD BY AUTOMATED COUNT: 14.4 % (ref 12.3–15.4)
ERYTHROCYTE [DISTWIDTH] IN BLOOD BY AUTOMATED COUNT: 14.4 % (ref 12.3–15.4)
ERYTHROCYTE [DISTWIDTH] IN BLOOD BY AUTOMATED COUNT: 14.5 % (ref 12.3–15.4)
FLUAV SUBTYP SPEC NAA+PROBE: NOT DETECTED
FLUBV RNA ISLT QL NAA+PROBE: NOT DETECTED
GFR SERPL CREATININE-BSD FRML MDRD: 125 ML/MIN/1.73
GFR SERPL CREATININE-BSD FRML MDRD: 18 ML/MIN/1.73
GFR SERPL CREATININE-BSD FRML MDRD: 51 ML/MIN/1.73
GFR SERPL CREATININE-BSD FRML MDRD: 51 ML/MIN/1.73
GFR SERPL CREATININE-BSD FRML MDRD: 71 ML/MIN/1.73
GFR SERPL CREATININE-BSD FRML MDRD: 91 ML/MIN/1.73
GFR SERPL CREATININE-BSD FRML MDRD: 95 ML/MIN/1.73
GFR SERPL CREATININE-BSD FRML MDRD: >150 ML/MIN/1.73
GLOBULIN UR ELPH-MCNC: 2.9 GM/DL
GLOBULIN UR ELPH-MCNC: 3.3 GM/DL
GLOBULIN UR ELPH-MCNC: 3.4 GM/DL
GLUCOSE BLDC GLUCOMTR-MCNC: 105 MG/DL (ref 70–130)
GLUCOSE BLDC GLUCOMTR-MCNC: 114 MG/DL (ref 70–130)
GLUCOSE BLDC GLUCOMTR-MCNC: 116 MG/DL (ref 70–130)
GLUCOSE BLDC GLUCOMTR-MCNC: 117 MG/DL (ref 70–130)
GLUCOSE BLDC GLUCOMTR-MCNC: 121 MG/DL (ref 70–130)
GLUCOSE BLDC GLUCOMTR-MCNC: 130 MG/DL (ref 70–130)
GLUCOSE BLDC GLUCOMTR-MCNC: 131 MG/DL (ref 70–130)
GLUCOSE BLDC GLUCOMTR-MCNC: 138 MG/DL (ref 70–130)
GLUCOSE BLDC GLUCOMTR-MCNC: 140 MG/DL (ref 70–130)
GLUCOSE BLDC GLUCOMTR-MCNC: 143 MG/DL (ref 70–130)
GLUCOSE BLDC GLUCOMTR-MCNC: 145 MG/DL (ref 70–130)
GLUCOSE BLDC GLUCOMTR-MCNC: 150 MG/DL (ref 70–130)
GLUCOSE BLDC GLUCOMTR-MCNC: 161 MG/DL (ref 70–130)
GLUCOSE BLDC GLUCOMTR-MCNC: 171 MG/DL (ref 70–130)
GLUCOSE BLDC GLUCOMTR-MCNC: 173 MG/DL (ref 70–130)
GLUCOSE BLDC GLUCOMTR-MCNC: 178 MG/DL (ref 70–130)
GLUCOSE BLDC GLUCOMTR-MCNC: 190 MG/DL (ref 70–130)
GLUCOSE BLDC GLUCOMTR-MCNC: 208 MG/DL (ref 70–130)
GLUCOSE BLDC GLUCOMTR-MCNC: 56 MG/DL (ref 70–130)
GLUCOSE BLDC GLUCOMTR-MCNC: 69 MG/DL (ref 70–130)
GLUCOSE BLDC GLUCOMTR-MCNC: 73 MG/DL (ref 70–130)
GLUCOSE BLDC GLUCOMTR-MCNC: 75 MG/DL (ref 70–130)
GLUCOSE BLDC GLUCOMTR-MCNC: 82 MG/DL (ref 70–130)
GLUCOSE BLDC GLUCOMTR-MCNC: 83 MG/DL (ref 70–130)
GLUCOSE BLDC GLUCOMTR-MCNC: 90 MG/DL (ref 70–130)
GLUCOSE BLDC GLUCOMTR-MCNC: 92 MG/DL (ref 70–130)
GLUCOSE SERPL-MCNC: 101 MG/DL (ref 65–99)
GLUCOSE SERPL-MCNC: 123 MG/DL (ref 65–99)
GLUCOSE SERPL-MCNC: 129 MG/DL (ref 65–99)
GLUCOSE SERPL-MCNC: 132 MG/DL (ref 65–99)
GLUCOSE SERPL-MCNC: 146 MG/DL (ref 65–99)
GLUCOSE SERPL-MCNC: 192 MG/DL (ref 65–99)
GLUCOSE SERPL-MCNC: 199 MG/DL (ref 65–99)
GLUCOSE SERPL-MCNC: 88 MG/DL (ref 65–99)
GLUCOSE UR STRIP-MCNC: NEGATIVE MG/DL
GRAM STN SPEC: ABNORMAL
HADV DNA SPEC NAA+PROBE: NOT DETECTED
HCO3 BLDA-SCNC: 22.6 MMOL/L (ref 22–28)
HCO3 BLDA-SCNC: 26.9 MMOL/L (ref 22–28)
HCOV 229E RNA SPEC QL NAA+PROBE: NOT DETECTED
HCOV HKU1 RNA SPEC QL NAA+PROBE: NOT DETECTED
HCOV NL63 RNA SPEC QL NAA+PROBE: NOT DETECTED
HCOV OC43 RNA SPEC QL NAA+PROBE: NOT DETECTED
HCT VFR BLD AUTO: 28.8 % (ref 34–46.6)
HCT VFR BLD AUTO: 31.2 % (ref 34–46.6)
HCT VFR BLD AUTO: 32 % (ref 34–46.6)
HCT VFR BLD AUTO: 35.2 % (ref 34–46.6)
HCT VFR BLD AUTO: 38.5 % (ref 34–46.6)
HCT VFR BLD AUTO: 44.1 % (ref 34–46.6)
HGB BLD-MCNC: 10.3 G/DL (ref 12–15.9)
HGB BLD-MCNC: 10.7 G/DL (ref 12–15.9)
HGB BLD-MCNC: 11.5 G/DL (ref 12–15.9)
HGB BLD-MCNC: 12.9 G/DL (ref 12–15.9)
HGB BLD-MCNC: 14.5 G/DL (ref 12–15.9)
HGB BLD-MCNC: 9.6 G/DL (ref 12–15.9)
HGB UR QL STRIP.AUTO: ABNORMAL
HMPV RNA NPH QL NAA+NON-PROBE: NOT DETECTED
HOLD SPECIMEN: NORMAL
HOLD SPECIMEN: NORMAL
HPIV1 RNA SPEC QL NAA+PROBE: NOT DETECTED
HPIV2 RNA SPEC QL NAA+PROBE: NOT DETECTED
HPIV3 RNA NPH QL NAA+PROBE: NOT DETECTED
HPIV4 P GENE NPH QL NAA+PROBE: NOT DETECTED
HYALINE CASTS UR QL AUTO: ABNORMAL /LPF
IMM GRANULOCYTES # BLD AUTO: 0.27 10*3/MM3 (ref 0–0.05)
IMM GRANULOCYTES # BLD AUTO: 0.41 10*3/MM3 (ref 0–0.05)
IMM GRANULOCYTES NFR BLD AUTO: 1.4 % (ref 0–0.5)
IMM GRANULOCYTES NFR BLD AUTO: 1.5 % (ref 0–0.5)
INR PPP: 1.29 (ref 0.9–1.1)
INR PPP: 1.37 (ref 0.9–1.1)
INR PPP: 1.42 (ref 0.9–1.1)
INR PPP: 1.52 (ref 0.9–1.1)
INR PPP: 1.52 (ref 0.9–1.1)
KETONES UR QL STRIP: ABNORMAL
LACTATE HOLD SPECIMEN: NORMAL
LEUKOCYTE ESTERASE UR QL STRIP.AUTO: ABNORMAL
LIPASE SERPL-CCNC: 38 U/L (ref 13–60)
LYMPHOCYTES # BLD AUTO: 0.67 10*3/MM3 (ref 0.7–3.1)
LYMPHOCYTES # BLD AUTO: 0.86 10*3/MM3 (ref 0.7–3.1)
LYMPHOCYTES # BLD AUTO: 0.95 10*3/MM3 (ref 0.7–3.1)
LYMPHOCYTES # BLD AUTO: 0.96 10*3/MM3 (ref 0.7–3.1)
LYMPHOCYTES # BLD MANUAL: 0.4 10*3/MM3 (ref 0.7–3.1)
LYMPHOCYTES # BLD MANUAL: 1.7 10*3/MM3 (ref 0.7–3.1)
LYMPHOCYTES NFR BLD AUTO: 3.4 % (ref 19.6–45.3)
LYMPHOCYTES NFR BLD AUTO: 3.9 % (ref 19.6–45.3)
LYMPHOCYTES NFR BLD AUTO: 4.6 % (ref 19.6–45.3)
LYMPHOCYTES NFR BLD AUTO: 6.1 % (ref 19.6–45.3)
LYMPHOCYTES NFR BLD MANUAL: 2 % (ref 19.6–45.3)
LYMPHOCYTES NFR BLD MANUAL: 2 % (ref 5–12)
LYMPHOCYTES NFR BLD MANUAL: 5 % (ref 19.6–45.3)
M PNEUMO IGG SER IA-ACNC: NOT DETECTED
MAGNESIUM SERPL-MCNC: 1.5 MG/DL (ref 1.6–2.4)
MAGNESIUM SERPL-MCNC: 1.7 MG/DL (ref 1.6–2.4)
MAGNESIUM SERPL-MCNC: 1.7 MG/DL (ref 1.6–2.4)
MAGNESIUM SERPL-MCNC: 2.6 MG/DL (ref 1.6–2.4)
MAGNESIUM SERPL-MCNC: 2.8 MG/DL (ref 1.6–2.4)
MCH RBC QN AUTO: 25.7 PG (ref 26.6–33)
MCH RBC QN AUTO: 25.9 PG (ref 26.6–33)
MCH RBC QN AUTO: 26.2 PG (ref 26.6–33)
MCH RBC QN AUTO: 26.4 PG (ref 26.6–33)
MCH RBC QN AUTO: 26.5 PG (ref 26.6–33)
MCH RBC QN AUTO: 26.7 PG (ref 26.6–33)
MCHC RBC AUTO-ENTMCNC: 32.7 G/DL (ref 31.5–35.7)
MCHC RBC AUTO-ENTMCNC: 32.9 G/DL (ref 31.5–35.7)
MCHC RBC AUTO-ENTMCNC: 33 G/DL (ref 31.5–35.7)
MCHC RBC AUTO-ENTMCNC: 33.3 G/DL (ref 31.5–35.7)
MCHC RBC AUTO-ENTMCNC: 33.4 G/DL (ref 31.5–35.7)
MCHC RBC AUTO-ENTMCNC: 33.5 G/DL (ref 31.5–35.7)
MCV RBC AUTO: 77.6 FL (ref 79–97)
MCV RBC AUTO: 77.8 FL (ref 79–97)
MCV RBC AUTO: 78.7 FL (ref 79–97)
MCV RBC AUTO: 79.2 FL (ref 79–97)
MCV RBC AUTO: 80.2 FL (ref 79–97)
MCV RBC AUTO: 81.2 FL (ref 79–97)
METHADONE UR QL SCN: NEGATIVE
MICROCYTES BLD QL: ABNORMAL
MODALITY: ABNORMAL
MODALITY: ABNORMAL
MONOCYTES # BLD AUTO: 0.68 10*3/MM3 (ref 0.1–0.9)
MONOCYTES # BLD AUTO: 0.93 10*3/MM3 (ref 0.1–0.9)
MONOCYTES # BLD AUTO: 1.03 10*3/MM3 (ref 0.1–0.9)
MONOCYTES # BLD AUTO: 1.21 10*3/MM3 (ref 0.1–0.9)
MONOCYTES # BLD AUTO: 1.23 10*3/MM3 (ref 0.1–0.9)
MONOCYTES NFR BLD AUTO: 4.4 % (ref 5–12)
MONOCYTES NFR BLD AUTO: 5.5 % (ref 5–12)
MONOCYTES NFR BLD AUTO: 5.9 % (ref 5–12)
MONOCYTES NFR BLD AUTO: 7 % (ref 5–12)
NEUTROPHILS # BLD AUTO: 19.65 10*3/MM3 (ref 1.7–7)
NEUTROPHILS # BLD AUTO: 31.6 10*3/MM3 (ref 1.7–7)
NEUTROPHILS NFR BLD AUTO: 13.09 10*3/MM3 (ref 1.7–7)
NEUTROPHILS NFR BLD AUTO: 14.79 10*3/MM3 (ref 1.7–7)
NEUTROPHILS NFR BLD AUTO: 16.2 10*3/MM3 (ref 1.7–7)
NEUTROPHILS NFR BLD AUTO: 25.04 10*3/MM3 (ref 1.7–7)
NEUTROPHILS NFR BLD AUTO: 83.5 % (ref 42.7–76)
NEUTROPHILS NFR BLD AUTO: 85.6 % (ref 42.7–76)
NEUTROPHILS NFR BLD AUTO: 87 % (ref 42.7–76)
NEUTROPHILS NFR BLD AUTO: 90.3 % (ref 42.7–76)
NEUTROPHILS NFR BLD MANUAL: 93 % (ref 42.7–76)
NEUTROPHILS NFR BLD MANUAL: 98 % (ref 42.7–76)
NITRITE UR QL STRIP: POSITIVE
NRBC BLD AUTO-RTO: 0.1 /100 WBC (ref 0–0.2)
NRBC BLD AUTO-RTO: 0.1 /100 WBC (ref 0–0.2)
OPIATES UR QL: NEGATIVE
OSMOLALITY UR: 539 MOSM/KG
OVALOCYTES BLD QL SMEAR: ABNORMAL
OXYCODONE UR QL SCN: NEGATIVE
PCO2 BLDA: 36.5 MM HG (ref 35–45)
PCO2 BLDA: 57.2 MM HG (ref 35–45)
PH BLDA: 7.28 PH UNITS (ref 7.35–7.45)
PH BLDA: 7.4 PH UNITS (ref 7.35–7.45)
PH UR STRIP.AUTO: 6 [PH] (ref 5–8)
PHOSPHATE SERPL-MCNC: 0.9 MG/DL (ref 2.5–4.5)
PHOSPHATE SERPL-MCNC: 1.3 MG/DL (ref 2.5–4.5)
PHOSPHATE SERPL-MCNC: 1.6 MG/DL (ref 2.5–4.5)
PHOSPHATE SERPL-MCNC: 2 MG/DL (ref 2.5–4.5)
PHOSPHATE SERPL-MCNC: 2.2 MG/DL (ref 2.5–4.5)
PHOSPHATE SERPL-MCNC: 2.2 MG/DL (ref 2.5–4.5)
PHOSPHATE SERPL-MCNC: 2.6 MG/DL (ref 2.5–4.5)
PHOSPHATE SERPL-MCNC: 2.7 MG/DL (ref 2.5–4.5)
PLAT MORPH BLD: NORMAL
PLAT MORPH BLD: NORMAL
PLATELET # BLD AUTO: 119 10*3/MM3 (ref 140–450)
PLATELET # BLD AUTO: 141 10*3/MM3 (ref 140–450)
PLATELET # BLD AUTO: 149 10*3/MM3 (ref 140–450)
PLATELET # BLD AUTO: 172 10*3/MM3 (ref 140–450)
PLATELET # BLD AUTO: 213 10*3/MM3 (ref 140–450)
PLATELET # BLD AUTO: 337 10*3/MM3 (ref 140–450)
PMV BLD AUTO: 10.1 FL (ref 6–12)
PMV BLD AUTO: 10.4 FL (ref 6–12)
PMV BLD AUTO: 10.7 FL (ref 6–12)
PMV BLD AUTO: 10.8 FL (ref 6–12)
PMV BLD AUTO: 11 FL (ref 6–12)
PMV BLD AUTO: 11.5 FL (ref 6–12)
PO2 BLDA: 67.2 MM HG (ref 80–100)
PO2 BLDA: 91 MM HG (ref 80–100)
POIKILOCYTOSIS BLD QL SMEAR: ABNORMAL
POIKILOCYTOSIS BLD QL SMEAR: ABNORMAL
POLYCHROMASIA BLD QL SMEAR: ABNORMAL
POTASSIUM SERPL-SCNC: 2.7 MMOL/L (ref 3.5–5.2)
POTASSIUM SERPL-SCNC: 3 MMOL/L (ref 3.5–5.2)
POTASSIUM SERPL-SCNC: 3.2 MMOL/L (ref 3.5–5.2)
POTASSIUM SERPL-SCNC: 3.2 MMOL/L (ref 3.5–5.2)
POTASSIUM SERPL-SCNC: 3.6 MMOL/L (ref 3.5–5.2)
POTASSIUM SERPL-SCNC: 3.8 MMOL/L (ref 3.5–5.2)
POTASSIUM SERPL-SCNC: 3.8 MMOL/L (ref 3.5–5.2)
POTASSIUM SERPL-SCNC: 4.4 MMOL/L (ref 3.5–5.2)
POTASSIUM UR-SCNC: 24.9 MMOL/L
PROCALCITONIN SERPL-MCNC: 0.34 NG/ML (ref 0–0.25)
PROT SERPL-MCNC: 4.9 G/DL (ref 6–8.5)
PROT SERPL-MCNC: 5.2 G/DL (ref 6–8.5)
PROT SERPL-MCNC: 6.5 G/DL (ref 6–8.5)
PROT UR QL STRIP: ABNORMAL
PROTHROMBIN TIME: 15.9 SECONDS (ref 11.7–14.2)
PROTHROMBIN TIME: 16.6 SECONDS (ref 11.7–14.2)
PROTHROMBIN TIME: 17.2 SECONDS (ref 11.7–14.2)
PROTHROMBIN TIME: 18 SECONDS (ref 11.7–14.2)
PROTHROMBIN TIME: 18.1 SECONDS (ref 11.7–14.2)
QT INTERVAL: 332 MS
QT INTERVAL: 389 MS
RBC # BLD AUTO: 3.71 10*6/MM3 (ref 3.77–5.28)
RBC # BLD AUTO: 4.01 10*6/MM3 (ref 3.77–5.28)
RBC # BLD AUTO: 4.04 10*6/MM3 (ref 3.77–5.28)
RBC # BLD AUTO: 4.39 10*6/MM3 (ref 3.77–5.28)
RBC # BLD AUTO: 4.89 10*6/MM3 (ref 3.77–5.28)
RBC # BLD AUTO: 5.43 10*6/MM3 (ref 3.77–5.28)
RBC # UR: ABNORMAL /HPF
REF LAB TEST METHOD: ABNORMAL
RH BLD: NEGATIVE
RHINOVIRUS RNA SPEC NAA+PROBE: NOT DETECTED
RSV RNA NPH QL NAA+NON-PROBE: NOT DETECTED
SAO2 % BLDCOA: 89.9 % (ref 92–99)
SAO2 % BLDCOA: 97.1 % (ref 92–99)
SARS-COV-2 RNA NPH QL NAA+NON-PROBE: NOT DETECTED
SET MECH RESP RATE: 20
SODIUM SERPL-SCNC: 146 MMOL/L (ref 136–145)
SODIUM SERPL-SCNC: 146 MMOL/L (ref 136–145)
SODIUM SERPL-SCNC: 149 MMOL/L (ref 136–145)
SODIUM SERPL-SCNC: 150 MMOL/L (ref 136–145)
SODIUM SERPL-SCNC: 154 MMOL/L (ref 136–145)
SODIUM SERPL-SCNC: 160 MMOL/L (ref 136–145)
SODIUM SERPL-SCNC: 162 MMOL/L (ref 136–145)
SODIUM SERPL-SCNC: 172 MMOL/L (ref 136–145)
SODIUM UR-SCNC: 150 MMOL/L
SP GR UR STRIP: 1.02 (ref 1–1.03)
SQUAMOUS #/AREA URNS HPF: ABNORMAL /HPF
T&S EXPIRATION DATE: NORMAL
T4 FREE SERPL-MCNC: 1.1 NG/DL (ref 0.93–1.7)
TOTAL RATE: 26 BREATHS/MINUTE
TROPONIN T SERPL-MCNC: 0.08 NG/ML (ref 0–0.03)
TSH SERPL DL<=0.05 MIU/L-ACNC: 1.69 UIU/ML (ref 0.27–4.2)
URATE SERPL-MCNC: 6.8 MG/DL (ref 2.4–5.7)
UROBILINOGEN UR QL STRIP: ABNORMAL
VANCOMYCIN TROUGH SERPL-MCNC: 18.5 MCG/ML (ref 5–20)
WBC # BLD AUTO: 15.69 10*3/MM3 (ref 3.4–10.8)
WBC # BLD AUTO: 17.28 10*3/MM3 (ref 3.4–10.8)
WBC # BLD AUTO: 18.63 10*3/MM3 (ref 3.4–10.8)
WBC # BLD AUTO: 20.05 10*3/MM3 (ref 3.4–10.8)
WBC # BLD AUTO: 27.76 10*3/MM3 (ref 3.4–10.8)
WBC # BLD AUTO: 33.98 10*3/MM3 (ref 3.4–10.8)
WBC MORPH BLD: NORMAL
WBC MORPH BLD: NORMAL
WBC UR QL AUTO: ABNORMAL /HPF
WHOLE BLOOD HOLD SPECIMEN: NORMAL
WHOLE BLOOD HOLD SPECIMEN: NORMAL

## 2020-01-01 PROCEDURE — 85610 PROTHROMBIN TIME: CPT | Performed by: INTERNAL MEDICINE

## 2020-01-01 PROCEDURE — 90715 TDAP VACCINE 7 YRS/> IM: CPT | Performed by: NURSE PRACTITIONER

## 2020-01-01 PROCEDURE — 25010000002 MORPHINE PER 10 MG: Performed by: INTERNAL MEDICINE

## 2020-01-01 PROCEDURE — 36600 WITHDRAWAL OF ARTERIAL BLOOD: CPT

## 2020-01-01 PROCEDURE — 80202 ASSAY OF VANCOMYCIN: CPT | Performed by: INTERNAL MEDICINE

## 2020-01-01 PROCEDURE — 82962 GLUCOSE BLOOD TEST: CPT

## 2020-01-01 PROCEDURE — 87150 DNA/RNA AMPLIFIED PROBE: CPT | Performed by: EMERGENCY MEDICINE

## 2020-01-01 PROCEDURE — 71045 X-RAY EXAM CHEST 1 VIEW: CPT

## 2020-01-01 PROCEDURE — 80069 RENAL FUNCTION PANEL: CPT | Performed by: INTERNAL MEDICINE

## 2020-01-01 PROCEDURE — 93010 ELECTROCARDIOGRAM REPORT: CPT | Performed by: INTERNAL MEDICINE

## 2020-01-01 PROCEDURE — 36415 COLL VENOUS BLD VENIPUNCTURE: CPT | Performed by: INTERNAL MEDICINE

## 2020-01-01 PROCEDURE — 83735 ASSAY OF MAGNESIUM: CPT | Performed by: INTERNAL MEDICINE

## 2020-01-01 PROCEDURE — 82550 ASSAY OF CK (CPK): CPT | Performed by: EMERGENCY MEDICINE

## 2020-01-01 PROCEDURE — 85007 BL SMEAR W/DIFF WBC COUNT: CPT | Performed by: INTERNAL MEDICINE

## 2020-01-01 PROCEDURE — 25010000002 CEFTRIAXONE PER 250 MG: Performed by: EMERGENCY MEDICINE

## 2020-01-01 PROCEDURE — 87040 BLOOD CULTURE FOR BACTERIA: CPT | Performed by: EMERGENCY MEDICINE

## 2020-01-01 PROCEDURE — 84145 PROCALCITONIN (PCT): CPT | Performed by: EMERGENCY MEDICINE

## 2020-01-01 PROCEDURE — 99285 EMERGENCY DEPT VISIT HI MDM: CPT

## 2020-01-01 PROCEDURE — 84484 ASSAY OF TROPONIN QUANT: CPT | Performed by: EMERGENCY MEDICINE

## 2020-01-01 PROCEDURE — 25010000002 CEFTRIAXONE PER 250 MG: Performed by: INTERNAL MEDICINE

## 2020-01-01 PROCEDURE — 25010000002 TDAP 5-2.5-18.5 LF-MCG/0.5 SUSPENSION: Performed by: NURSE PRACTITIONER

## 2020-01-01 PROCEDURE — 85025 COMPLETE CBC W/AUTO DIFF WBC: CPT | Performed by: INTERNAL MEDICINE

## 2020-01-01 PROCEDURE — 94799 UNLISTED PULMONARY SVC/PX: CPT

## 2020-01-01 PROCEDURE — 84100 ASSAY OF PHOSPHORUS: CPT | Performed by: INTERNAL MEDICINE

## 2020-01-01 PROCEDURE — 25010000002 LORAZEPAM PER 2 MG: Performed by: INTERNAL MEDICINE

## 2020-01-01 PROCEDURE — 93005 ELECTROCARDIOGRAM TRACING: CPT | Performed by: EMERGENCY MEDICINE

## 2020-01-01 PROCEDURE — 25010000002 VANCOMYCIN 750 MG RECONSTITUTED SOLUTION: Performed by: INTERNAL MEDICINE

## 2020-01-01 PROCEDURE — 25010000002 VANCOMYCIN 10 G RECONSTITUTED SOLUTION: Performed by: INTERNAL MEDICINE

## 2020-01-01 PROCEDURE — 85007 BL SMEAR W/DIFF WBC COUNT: CPT | Performed by: EMERGENCY MEDICINE

## 2020-01-01 PROCEDURE — 87186 SC STD MICRODIL/AGAR DIL: CPT | Performed by: EMERGENCY MEDICINE

## 2020-01-01 PROCEDURE — 82803 BLOOD GASES ANY COMBINATION: CPT

## 2020-01-01 PROCEDURE — 83935 ASSAY OF URINE OSMOLALITY: CPT | Performed by: INTERNAL MEDICINE

## 2020-01-01 PROCEDURE — 80048 BASIC METABOLIC PNL TOTAL CA: CPT | Performed by: INTERNAL MEDICINE

## 2020-01-01 PROCEDURE — 70450 CT HEAD/BRAIN W/O DYE: CPT

## 2020-01-01 PROCEDURE — 84443 ASSAY THYROID STIM HORMONE: CPT | Performed by: EMERGENCY MEDICINE

## 2020-01-01 PROCEDURE — 71250 CT THORAX DX C-: CPT

## 2020-01-01 PROCEDURE — 80307 DRUG TEST PRSMV CHEM ANLYZR: CPT | Performed by: INTERNAL MEDICINE

## 2020-01-01 PROCEDURE — 73502 X-RAY EXAM HIP UNI 2-3 VIEWS: CPT

## 2020-01-01 PROCEDURE — 25010000002 ALBUMIN HUMAN 25% PER 50 ML: Performed by: INTERNAL MEDICINE

## 2020-01-01 PROCEDURE — 86901 BLOOD TYPING SEROLOGIC RH(D): CPT | Performed by: EMERGENCY MEDICINE

## 2020-01-01 PROCEDURE — 25010000003 POTASSIUM CHLORIDE 10 MEQ/100ML SOLUTION: Performed by: INTERNAL MEDICINE

## 2020-01-01 PROCEDURE — 80053 COMPREHEN METABOLIC PANEL: CPT | Performed by: INTERNAL MEDICINE

## 2020-01-01 PROCEDURE — 84439 ASSAY OF FREE THYROXINE: CPT | Performed by: EMERGENCY MEDICINE

## 2020-01-01 PROCEDURE — 99283 EMERGENCY DEPT VISIT LOW MDM: CPT

## 2020-01-01 PROCEDURE — 84133 ASSAY OF URINE POTASSIUM: CPT | Performed by: INTERNAL MEDICINE

## 2020-01-01 PROCEDURE — 82533 TOTAL CORTISOL: CPT | Performed by: INTERNAL MEDICINE

## 2020-01-01 PROCEDURE — 83605 ASSAY OF LACTIC ACID: CPT | Performed by: EMERGENCY MEDICINE

## 2020-01-01 PROCEDURE — 86850 RBC ANTIBODY SCREEN: CPT | Performed by: EMERGENCY MEDICINE

## 2020-01-01 PROCEDURE — 0202U NFCT DS 22 TRGT SARS-COV-2: CPT | Performed by: EMERGENCY MEDICINE

## 2020-01-01 PROCEDURE — 72125 CT NECK SPINE W/O DYE: CPT

## 2020-01-01 PROCEDURE — 99284 EMERGENCY DEPT VISIT MOD MDM: CPT

## 2020-01-01 PROCEDURE — 25010000002 VANCOMYCIN PER 500 MG: Performed by: NURSE PRACTITIONER

## 2020-01-01 PROCEDURE — 83735 ASSAY OF MAGNESIUM: CPT | Performed by: EMERGENCY MEDICINE

## 2020-01-01 PROCEDURE — 85025 COMPLETE CBC W/AUTO DIFF WBC: CPT | Performed by: EMERGENCY MEDICINE

## 2020-01-01 PROCEDURE — 87086 URINE CULTURE/COLONY COUNT: CPT | Performed by: INTERNAL MEDICINE

## 2020-01-01 PROCEDURE — 83690 ASSAY OF LIPASE: CPT | Performed by: EMERGENCY MEDICINE

## 2020-01-01 PROCEDURE — C1751 CATH, INF, PER/CENT/MIDLINE: HCPCS

## 2020-01-01 PROCEDURE — 84300 ASSAY OF URINE SODIUM: CPT | Performed by: INTERNAL MEDICINE

## 2020-01-01 PROCEDURE — 90471 IMMUNIZATION ADMIN: CPT | Performed by: NURSE PRACTITIONER

## 2020-01-01 PROCEDURE — 81001 URINALYSIS AUTO W/SCOPE: CPT | Performed by: EMERGENCY MEDICINE

## 2020-01-01 PROCEDURE — 82436 ASSAY OF URINE CHLORIDE: CPT | Performed by: INTERNAL MEDICINE

## 2020-01-01 PROCEDURE — 82570 ASSAY OF URINE CREATININE: CPT | Performed by: INTERNAL MEDICINE

## 2020-01-01 PROCEDURE — P9047 ALBUMIN (HUMAN), 25%, 50ML: HCPCS | Performed by: INTERNAL MEDICINE

## 2020-01-01 PROCEDURE — 87147 CULTURE TYPE IMMUNOLOGIC: CPT | Performed by: EMERGENCY MEDICINE

## 2020-01-01 PROCEDURE — 93005 ELECTROCARDIOGRAM TRACING: CPT | Performed by: INTERNAL MEDICINE

## 2020-01-01 PROCEDURE — 80053 COMPREHEN METABOLIC PANEL: CPT | Performed by: EMERGENCY MEDICINE

## 2020-01-01 PROCEDURE — 74176 CT ABD & PELVIS W/O CONTRAST: CPT

## 2020-01-01 PROCEDURE — 86900 BLOOD TYPING SEROLOGIC ABO: CPT | Performed by: EMERGENCY MEDICINE

## 2020-01-01 PROCEDURE — 84550 ASSAY OF BLOOD/URIC ACID: CPT | Performed by: INTERNAL MEDICINE

## 2020-01-01 RX ORDER — ALUMINA, MAGNESIA, AND SIMETHICONE 2400; 2400; 240 MG/30ML; MG/30ML; MG/30ML
15 SUSPENSION ORAL EVERY 6 HOURS PRN
Status: DISCONTINUED | OUTPATIENT
Start: 2020-01-01 | End: 2020-01-01 | Stop reason: HOSPADM

## 2020-01-01 RX ORDER — SODIUM CHLORIDE 0.9 % (FLUSH) 0.9 %
10 SYRINGE (ML) INJECTION AS NEEDED
Status: DISCONTINUED | OUTPATIENT
Start: 2020-01-01 | End: 2020-01-01 | Stop reason: HOSPADM

## 2020-01-01 RX ORDER — AMOXICILLIN 250 MG
2 CAPSULE ORAL NIGHTLY PRN
Status: DISCONTINUED | OUTPATIENT
Start: 2020-01-01 | End: 2020-01-01 | Stop reason: HOSPADM

## 2020-01-01 RX ORDER — POTASSIUM CHLORIDE 7.45 MG/ML
10 INJECTION INTRAVENOUS
Status: DISCONTINUED | OUTPATIENT
Start: 2020-01-01 | End: 2020-01-01

## 2020-01-01 RX ORDER — SODIUM CHLORIDE 0.9 % (FLUSH) 0.9 %
10 SYRINGE (ML) INJECTION AS NEEDED
Status: CANCELLED | OUTPATIENT
Start: 2020-01-01

## 2020-01-01 RX ORDER — CEFTRIAXONE SODIUM 1 G/50ML
1 INJECTION, SOLUTION INTRAVENOUS ONCE
Status: COMPLETED | OUTPATIENT
Start: 2020-01-01 | End: 2020-01-01

## 2020-01-01 RX ORDER — MORPHINE SULFATE 2 MG/ML
1 INJECTION, SOLUTION INTRAMUSCULAR; INTRAVENOUS
Status: DISCONTINUED | OUTPATIENT
Start: 2020-01-01 | End: 2020-01-01

## 2020-01-01 RX ORDER — POTASSIUM CHLORIDE 1.5 G/1.77G
40 POWDER, FOR SOLUTION ORAL AS NEEDED
Status: DISCONTINUED | OUTPATIENT
Start: 2020-01-01 | End: 2020-01-01

## 2020-01-01 RX ORDER — CLONAZEPAM 0.5 MG/1
0.25 TABLET ORAL NIGHTLY
Status: DISCONTINUED | OUTPATIENT
Start: 2020-01-01 | End: 2020-01-01

## 2020-01-01 RX ORDER — DOXEPIN HYDROCHLORIDE 25 MG/1
25 CAPSULE ORAL NIGHTLY
Status: DISCONTINUED | OUTPATIENT
Start: 2020-01-01 | End: 2020-01-01

## 2020-01-01 RX ORDER — MAGNESIUM SULFATE HEPTAHYDRATE 40 MG/ML
2 INJECTION, SOLUTION INTRAVENOUS AS NEEDED
Status: DISCONTINUED | OUTPATIENT
Start: 2020-01-01 | End: 2020-01-01

## 2020-01-01 RX ORDER — MORPHINE SULFATE 2 MG/ML
1 INJECTION, SOLUTION INTRAMUSCULAR; INTRAVENOUS
Status: CANCELLED | OUTPATIENT
Start: 2020-01-01 | End: 2020-11-27

## 2020-01-01 RX ORDER — POTASSIUM CHLORIDE 750 MG/1
10 CAPSULE, EXTENDED RELEASE ORAL DAILY
COMMUNITY

## 2020-01-01 RX ORDER — SODIUM CHLORIDE 0.9 % (FLUSH) 0.9 %
10 SYRINGE (ML) INJECTION EVERY 12 HOURS SCHEDULED
Status: CANCELLED | OUTPATIENT
Start: 2020-01-01

## 2020-01-01 RX ORDER — MEGESTROL ACETATE 40 MG/ML
400 SUSPENSION ORAL DAILY
Status: DISCONTINUED | OUTPATIENT
Start: 2020-01-01 | End: 2020-01-01

## 2020-01-01 RX ORDER — IPRATROPIUM BROMIDE AND ALBUTEROL SULFATE 2.5; .5 MG/3ML; MG/3ML
3 SOLUTION RESPIRATORY (INHALATION) EVERY 6 HOURS PRN
Status: DISCONTINUED | OUTPATIENT
Start: 2020-01-01 | End: 2020-01-01

## 2020-01-01 RX ORDER — MORPHINE SULFATE 2 MG/ML
2 INJECTION, SOLUTION INTRAMUSCULAR; INTRAVENOUS
Status: DISCONTINUED | OUTPATIENT
Start: 2020-01-01 | End: 2020-01-01 | Stop reason: HOSPADM

## 2020-01-01 RX ORDER — LABETALOL HYDROCHLORIDE 5 MG/ML
20 INJECTION, SOLUTION INTRAVENOUS
Status: DISCONTINUED | OUTPATIENT
Start: 2020-01-01 | End: 2020-01-01 | Stop reason: HOSPADM

## 2020-01-01 RX ORDER — MORPHINE SULFATE 2 MG/ML
1 INJECTION, SOLUTION INTRAMUSCULAR; INTRAVENOUS
Status: DISCONTINUED | OUTPATIENT
Start: 2020-01-01 | End: 2020-01-01 | Stop reason: HOSPADM

## 2020-01-01 RX ORDER — DEXTROSE MONOHYDRATE 25 G/50ML
INJECTION, SOLUTION INTRAVENOUS
Status: COMPLETED
Start: 2020-01-01 | End: 2020-01-01

## 2020-01-01 RX ORDER — DEXTROSE MONOHYDRATE 50 MG/ML
125 INJECTION, SOLUTION INTRAVENOUS CONTINUOUS
Status: DISCONTINUED | OUTPATIENT
Start: 2020-01-01 | End: 2020-01-01

## 2020-01-01 RX ORDER — ONDANSETRON 2 MG/ML
4 INJECTION INTRAMUSCULAR; INTRAVENOUS EVERY 6 HOURS PRN
Status: CANCELLED | OUTPATIENT
Start: 2020-01-01

## 2020-01-01 RX ORDER — ALBUMIN (HUMAN) 12.5 G/50ML
25 SOLUTION INTRAVENOUS
Status: COMPLETED | OUTPATIENT
Start: 2020-01-01 | End: 2020-01-01

## 2020-01-01 RX ORDER — LORAZEPAM 2 MG/ML
1 INJECTION INTRAMUSCULAR
Status: DISCONTINUED | OUTPATIENT
Start: 2020-01-01 | End: 2020-01-01 | Stop reason: HOSPADM

## 2020-01-01 RX ORDER — AMOXICILLIN 250 MG
2 CAPSULE ORAL NIGHTLY PRN
Status: CANCELLED | OUTPATIENT
Start: 2020-01-01

## 2020-01-01 RX ORDER — VANCOMYCIN HYDROCHLORIDE 1 G/200ML
20 INJECTION, SOLUTION INTRAVENOUS ONCE
Status: COMPLETED | OUTPATIENT
Start: 2020-01-01 | End: 2020-01-01

## 2020-01-01 RX ORDER — ASPIRIN 81 MG/1
81 TABLET ORAL DAILY
Status: DISCONTINUED | OUTPATIENT
Start: 2020-01-01 | End: 2020-01-01

## 2020-01-01 RX ORDER — SODIUM CHLORIDE 0.9 % (FLUSH) 0.9 %
10 SYRINGE (ML) INJECTION EVERY 12 HOURS SCHEDULED
Status: DISCONTINUED | OUTPATIENT
Start: 2020-01-01 | End: 2020-01-01 | Stop reason: HOSPADM

## 2020-01-01 RX ORDER — MORPHINE SULFATE 2 MG/ML
2 INJECTION, SOLUTION INTRAMUSCULAR; INTRAVENOUS
Status: CANCELLED | OUTPATIENT
Start: 2020-01-01

## 2020-01-01 RX ORDER — ALUMINA, MAGNESIA, AND SIMETHICONE 2400; 2400; 240 MG/30ML; MG/30ML; MG/30ML
15 SUSPENSION ORAL EVERY 6 HOURS PRN
Status: CANCELLED | OUTPATIENT
Start: 2020-01-01

## 2020-01-01 RX ORDER — MIDODRINE HYDROCHLORIDE 5 MG/1
10 TABLET ORAL ONCE
Status: COMPLETED | OUTPATIENT
Start: 2020-01-01 | End: 2020-01-01

## 2020-01-01 RX ORDER — CHOLECALCIFEROL (VITAMIN D3) 125 MCG
1000 CAPSULE ORAL DAILY
Status: DISCONTINUED | OUTPATIENT
Start: 2020-01-01 | End: 2020-01-01

## 2020-01-01 RX ORDER — MIDODRINE HYDROCHLORIDE 5 MG/1
10 TABLET ORAL
Status: DISCONTINUED | OUTPATIENT
Start: 2020-01-01 | End: 2020-01-01

## 2020-01-01 RX ORDER — MAGNESIUM SULFATE HEPTAHYDRATE 40 MG/ML
4 INJECTION, SOLUTION INTRAVENOUS AS NEEDED
Status: DISCONTINUED | OUTPATIENT
Start: 2020-01-01 | End: 2020-01-01

## 2020-01-01 RX ORDER — ONDANSETRON 2 MG/ML
4 INJECTION INTRAMUSCULAR; INTRAVENOUS EVERY 6 HOURS PRN
Status: DISCONTINUED | OUTPATIENT
Start: 2020-01-01 | End: 2020-01-01 | Stop reason: HOSPADM

## 2020-01-01 RX ORDER — POTASSIUM CHLORIDE 750 MG/1
40 TABLET, FILM COATED, EXTENDED RELEASE ORAL AS NEEDED
Status: DISCONTINUED | OUTPATIENT
Start: 2020-01-01 | End: 2020-01-01

## 2020-01-01 RX ORDER — IPRATROPIUM BROMIDE AND ALBUTEROL SULFATE 2.5; .5 MG/3ML; MG/3ML
3 SOLUTION RESPIRATORY (INHALATION)
Status: DISCONTINUED | OUTPATIENT
Start: 2020-01-01 | End: 2020-01-01 | Stop reason: HOSPADM

## 2020-01-01 RX ORDER — POTASSIUM CHLORIDE 1.5 G/1.77G
40 POWDER, FOR SOLUTION ORAL EVERY 4 HOURS
Status: COMPLETED | OUTPATIENT
Start: 2020-01-01 | End: 2020-01-01

## 2020-01-01 RX ORDER — LABETALOL HYDROCHLORIDE 5 MG/ML
20 INJECTION, SOLUTION INTRAVENOUS
Status: CANCELLED | OUTPATIENT
Start: 2020-01-01

## 2020-01-01 RX ORDER — MIDODRINE HYDROCHLORIDE 5 MG/1
20 TABLET ORAL
Status: DISCONTINUED | OUTPATIENT
Start: 2020-01-01 | End: 2020-01-01

## 2020-01-01 RX ORDER — DEXTROSE AND SODIUM CHLORIDE 5; .45 G/100ML; G/100ML
150 INJECTION, SOLUTION INTRAVENOUS CONTINUOUS
Status: DISCONTINUED | OUTPATIENT
Start: 2020-01-01 | End: 2020-01-01

## 2020-01-01 RX ORDER — CLONAZEPAM 0.5 MG/1
0.5 TABLET ORAL 2 TIMES DAILY PRN
COMMUNITY

## 2020-01-01 RX ORDER — SODIUM CHLORIDE 9 MG/ML
125 INJECTION, SOLUTION INTRAVENOUS CONTINUOUS
Status: DISCONTINUED | OUTPATIENT
Start: 2020-01-01 | End: 2020-01-01

## 2020-01-01 RX ORDER — MEGESTROL ACETATE 40 MG/ML
SUSPENSION ORAL DAILY
COMMUNITY

## 2020-01-01 RX ORDER — IPRATROPIUM BROMIDE AND ALBUTEROL SULFATE 2.5; .5 MG/3ML; MG/3ML
3 SOLUTION RESPIRATORY (INHALATION)
Status: CANCELLED | OUTPATIENT
Start: 2020-01-01

## 2020-01-01 RX ORDER — SODIUM CHLORIDE 0.9 % (FLUSH) 0.9 %
20 SYRINGE (ML) INJECTION AS NEEDED
Status: CANCELLED | OUTPATIENT
Start: 2020-01-01

## 2020-01-01 RX ORDER — DOXEPIN HYDROCHLORIDE 25 MG/1
25 CAPSULE ORAL NIGHTLY
COMMUNITY

## 2020-01-01 RX ORDER — CEFTRIAXONE SODIUM 1 G/50ML
1 INJECTION, SOLUTION INTRAVENOUS EVERY 24 HOURS
Status: DISCONTINUED | OUTPATIENT
Start: 2020-01-01 | End: 2020-01-01

## 2020-01-01 RX ADMIN — POTASSIUM PHOSPHATE, MONOBASIC AND POTASSIUM PHOSPHATE, DIBASIC 21 MMOL: 224; 236 INJECTION, SOLUTION, CONCENTRATE INTRAVENOUS at 09:38

## 2020-01-01 RX ADMIN — MAGNESIUM HYDROXIDE 10 ML: 2400 SUSPENSION ORAL at 12:29

## 2020-01-01 RX ADMIN — MORPHINE SULFATE 1 MG: 2 INJECTION, SOLUTION INTRAMUSCULAR; INTRAVENOUS at 10:22

## 2020-01-01 RX ADMIN — ALBUMIN HUMAN 25 G: 0.25 SOLUTION INTRAVENOUS at 15:30

## 2020-01-01 RX ADMIN — IPRATROPIUM BROMIDE AND ALBUTEROL SULFATE 3 ML: 2.5; .5 SOLUTION RESPIRATORY (INHALATION) at 07:25

## 2020-01-01 RX ADMIN — MIDODRINE HYDROCHLORIDE 10 MG: 5 TABLET ORAL at 17:10

## 2020-01-01 RX ADMIN — SODIUM CHLORIDE, PRESERVATIVE FREE 10 ML: 5 INJECTION INTRAVENOUS at 08:26

## 2020-01-01 RX ADMIN — MORPHINE SULFATE 1 MG: 2 INJECTION, SOLUTION INTRAMUSCULAR; INTRAVENOUS at 18:12

## 2020-01-01 RX ADMIN — MORPHINE SULFATE 2 MG: 2 INJECTION, SOLUTION INTRAMUSCULAR; INTRAVENOUS at 21:17

## 2020-01-01 RX ADMIN — CEFTRIAXONE SODIUM 1 G: 1 INJECTION, SOLUTION INTRAVENOUS at 13:40

## 2020-01-01 RX ADMIN — CEFTRIAXONE SODIUM 1 G: 1 INJECTION, SOLUTION INTRAVENOUS at 10:00

## 2020-01-01 RX ADMIN — POTASSIUM CHLORIDE 40 MEQ: 1.5 POWDER, FOR SOLUTION ORAL at 10:00

## 2020-01-01 RX ADMIN — MORPHINE SULFATE 2 MG: 2 INJECTION, SOLUTION INTRAMUSCULAR; INTRAVENOUS at 08:25

## 2020-01-01 RX ADMIN — POTASSIUM CHLORIDE 10 MEQ: 7.46 INJECTION, SOLUTION INTRAVENOUS at 00:29

## 2020-01-01 RX ADMIN — DEXTROSE MONOHYDRATE 50 ML: 500 INJECTION PARENTERAL at 15:27

## 2020-01-01 RX ADMIN — MIDODRINE HYDROCHLORIDE 20 MG: 5 TABLET ORAL at 07:05

## 2020-01-01 RX ADMIN — LORAZEPAM 1 MG: 2 INJECTION INTRAMUSCULAR; INTRAVENOUS at 00:57

## 2020-01-01 RX ADMIN — LORAZEPAM 1 MG: 2 INJECTION INTRAMUSCULAR; INTRAVENOUS at 17:10

## 2020-01-01 RX ADMIN — VANCOMYCIN HYDROCHLORIDE 1250 MG: 10 INJECTION, POWDER, LYOPHILIZED, FOR SOLUTION INTRAVENOUS at 12:54

## 2020-01-01 RX ADMIN — DEXTROSE MONOHYDRATE 150 ML/HR: 50 INJECTION, SOLUTION INTRAVENOUS at 06:59

## 2020-01-01 RX ADMIN — SODIUM CHLORIDE, PRESERVATIVE FREE 10 ML: 5 INJECTION INTRAVENOUS at 21:16

## 2020-01-01 RX ADMIN — DEXTROSE MONOHYDRATE 150 ML/HR: 50 INJECTION, SOLUTION INTRAVENOUS at 14:21

## 2020-01-01 RX ADMIN — IPRATROPIUM BROMIDE AND ALBUTEROL SULFATE 3 ML: 2.5; .5 SOLUTION RESPIRATORY (INHALATION) at 10:16

## 2020-01-01 RX ADMIN — ASPIRIN 81 MG: 81 TABLET, COATED ORAL at 10:57

## 2020-01-01 RX ADMIN — SODIUM CHLORIDE 750 MG: 900 INJECTION, SOLUTION INTRAVENOUS at 23:18

## 2020-01-01 RX ADMIN — SODIUM CHLORIDE, PRESERVATIVE FREE 10 ML: 5 INJECTION INTRAVENOUS at 23:18

## 2020-01-01 RX ADMIN — POTASSIUM CHLORIDE 10 MEQ: 7.46 INJECTION, SOLUTION INTRAVENOUS at 06:15

## 2020-01-01 RX ADMIN — MORPHINE SULFATE 1 MG: 2 INJECTION, SOLUTION INTRAMUSCULAR; INTRAVENOUS at 03:44

## 2020-01-01 RX ADMIN — MORPHINE SULFATE 2 MG: 2 INJECTION, SOLUTION INTRAMUSCULAR; INTRAVENOUS at 20:54

## 2020-01-01 RX ADMIN — MORPHINE SULFATE 2 MG: 2 INJECTION, SOLUTION INTRAMUSCULAR; INTRAVENOUS at 12:39

## 2020-01-01 RX ADMIN — MORPHINE SULFATE 2 MG: 2 INJECTION, SOLUTION INTRAMUSCULAR; INTRAVENOUS at 12:28

## 2020-01-01 RX ADMIN — MORPHINE SULFATE 2 MG: 2 INJECTION, SOLUTION INTRAMUSCULAR; INTRAVENOUS at 04:15

## 2020-01-01 RX ADMIN — IPRATROPIUM BROMIDE AND ALBUTEROL SULFATE 3 ML: 2.5; .5 SOLUTION RESPIRATORY (INHALATION) at 11:22

## 2020-01-01 RX ADMIN — MORPHINE SULFATE 2 MG: 2 INJECTION, SOLUTION INTRAMUSCULAR; INTRAVENOUS at 03:30

## 2020-01-01 RX ADMIN — MORPHINE SULFATE 2 MG: 2 INJECTION, SOLUTION INTRAMUSCULAR; INTRAVENOUS at 09:29

## 2020-01-01 RX ADMIN — MORPHINE SULFATE 2 MG: 2 INJECTION, SOLUTION INTRAMUSCULAR; INTRAVENOUS at 05:09

## 2020-01-01 RX ADMIN — MORPHINE SULFATE 2 MG: 2 INJECTION, SOLUTION INTRAMUSCULAR; INTRAVENOUS at 12:38

## 2020-01-01 RX ADMIN — SODIUM CHLORIDE 1000 ML: 9 INJECTION, SOLUTION INTRAVENOUS at 17:48

## 2020-01-01 RX ADMIN — DEXTROSE AND SODIUM CHLORIDE 150 ML/HR: 5; 450 INJECTION, SOLUTION INTRAVENOUS at 07:10

## 2020-01-01 RX ADMIN — MIDODRINE HYDROCHLORIDE 20 MG: 5 TABLET ORAL at 17:06

## 2020-01-01 RX ADMIN — SODIUM CHLORIDE, PRESERVATIVE FREE 10 ML: 5 INJECTION INTRAVENOUS at 20:14

## 2020-01-01 RX ADMIN — MORPHINE SULFATE 1 MG: 2 INJECTION, SOLUTION INTRAMUSCULAR; INTRAVENOUS at 16:11

## 2020-01-01 RX ADMIN — POTASSIUM PHOSPHATE, MONOBASIC AND POTASSIUM PHOSPHATE, DIBASIC 15 MMOL: 224; 236 INJECTION, SOLUTION, CONCENTRATE INTRAVENOUS at 10:49

## 2020-01-01 RX ADMIN — POTASSIUM CHLORIDE 40 MEQ: 1.5 POWDER, FOR SOLUTION ORAL at 17:10

## 2020-01-01 RX ADMIN — DEXTROSE MONOHYDRATE 150 ML/HR: 50 INJECTION, SOLUTION INTRAVENOUS at 00:04

## 2020-01-01 RX ADMIN — VANCOMYCIN HYDROCHLORIDE 1250 MG: 10 INJECTION, POWDER, LYOPHILIZED, FOR SOLUTION INTRAVENOUS at 12:28

## 2020-01-01 RX ADMIN — SODIUM CHLORIDE, PRESERVATIVE FREE 10 ML: 5 INJECTION INTRAVENOUS at 17:49

## 2020-01-01 RX ADMIN — SODIUM CHLORIDE 750 MG: 900 INJECTION, SOLUTION INTRAVENOUS at 12:38

## 2020-01-01 RX ADMIN — SODIUM CHLORIDE 750 MG: 900 INJECTION, SOLUTION INTRAVENOUS at 00:01

## 2020-01-01 RX ADMIN — IPRATROPIUM BROMIDE AND ALBUTEROL SULFATE 3 ML: 2.5; .5 SOLUTION RESPIRATORY (INHALATION) at 07:44

## 2020-01-01 RX ADMIN — SODIUM CHLORIDE, PRESERVATIVE FREE 10 ML: 5 INJECTION INTRAVENOUS at 15:37

## 2020-01-01 RX ADMIN — DEXTROSE AND SODIUM CHLORIDE 150 ML/HR: 5; 450 INJECTION, SOLUTION INTRAVENOUS at 17:05

## 2020-01-01 RX ADMIN — LORAZEPAM 1 MG: 2 INJECTION INTRAMUSCULAR; INTRAVENOUS at 04:56

## 2020-01-01 RX ADMIN — MORPHINE SULFATE 2 MG: 2 INJECTION, SOLUTION INTRAMUSCULAR; INTRAVENOUS at 00:21

## 2020-01-01 RX ADMIN — MORPHINE SULFATE 1 MG: 2 INJECTION, SOLUTION INTRAMUSCULAR; INTRAVENOUS at 14:20

## 2020-01-01 RX ADMIN — SODIUM CHLORIDE, PRESERVATIVE FREE 10 ML: 5 INJECTION INTRAVENOUS at 12:03

## 2020-01-01 RX ADMIN — MORPHINE SULFATE 1 MG: 2 INJECTION, SOLUTION INTRAMUSCULAR; INTRAVENOUS at 20:30

## 2020-01-01 RX ADMIN — LORAZEPAM 1 MG: 2 INJECTION INTRAMUSCULAR; INTRAVENOUS at 12:39

## 2020-01-01 RX ADMIN — MORPHINE SULFATE 2 MG: 2 INJECTION, SOLUTION INTRAMUSCULAR; INTRAVENOUS at 08:06

## 2020-01-01 RX ADMIN — MORPHINE SULFATE 2 MG: 2 INJECTION, SOLUTION INTRAMUSCULAR; INTRAVENOUS at 00:57

## 2020-01-01 RX ADMIN — POTASSIUM CHLORIDE 10 MEQ: 7.46 INJECTION, SOLUTION INTRAVENOUS at 16:21

## 2020-01-01 RX ADMIN — IPRATROPIUM BROMIDE AND ALBUTEROL SULFATE 3 ML: 2.5; .5 SOLUTION RESPIRATORY (INHALATION) at 13:06

## 2020-01-01 RX ADMIN — POTASSIUM CHLORIDE 10 MEQ: 7.46 INJECTION, SOLUTION INTRAVENOUS at 13:40

## 2020-01-01 RX ADMIN — SODIUM CHLORIDE, PRESERVATIVE FREE 10 ML: 5 INJECTION INTRAVENOUS at 10:25

## 2020-01-01 RX ADMIN — SODIUM CHLORIDE, POTASSIUM CHLORIDE, SODIUM LACTATE AND CALCIUM CHLORIDE 1000 ML: 600; 310; 30; 20 INJECTION, SOLUTION INTRAVENOUS at 13:27

## 2020-01-01 RX ADMIN — CEFTRIAXONE SODIUM 1 G: 1 INJECTION, SOLUTION INTRAVENOUS at 10:57

## 2020-01-01 RX ADMIN — VANCOMYCIN HYDROCHLORIDE 1250 MG: 10 INJECTION, POWDER, LYOPHILIZED, FOR SOLUTION INTRAVENOUS at 12:39

## 2020-01-01 RX ADMIN — POTASSIUM CHLORIDE 10 MEQ: 7.46 INJECTION, SOLUTION INTRAVENOUS at 01:41

## 2020-01-01 RX ADMIN — Medication 1000 MCG: at 10:57

## 2020-01-01 RX ADMIN — CEFTRIAXONE SODIUM 1 G: 1 INJECTION, SOLUTION INTRAVENOUS at 10:22

## 2020-01-01 RX ADMIN — DOXEPIN HYDROCHLORIDE 25 MG: 25 CAPSULE ORAL at 21:02

## 2020-01-01 RX ADMIN — LORAZEPAM 1 MG: 2 INJECTION INTRAMUSCULAR; INTRAVENOUS at 21:16

## 2020-01-01 RX ADMIN — VANCOMYCIN HYDROCHLORIDE 1000 MG: 1 INJECTION, SOLUTION INTRAVENOUS at 00:24

## 2020-01-01 RX ADMIN — MORPHINE SULFATE 1 MG: 2 INJECTION, SOLUTION INTRAMUSCULAR; INTRAVENOUS at 23:30

## 2020-01-01 RX ADMIN — LORAZEPAM 1 MG: 2 INJECTION INTRAMUSCULAR; INTRAVENOUS at 00:21

## 2020-01-01 RX ADMIN — SODIUM CHLORIDE, PRESERVATIVE FREE 10 ML: 5 INJECTION INTRAVENOUS at 09:58

## 2020-01-01 RX ADMIN — MORPHINE SULFATE 1 MG: 2 INJECTION, SOLUTION INTRAMUSCULAR; INTRAVENOUS at 05:55

## 2020-01-01 RX ADMIN — SODIUM CHLORIDE 750 MG: 900 INJECTION, SOLUTION INTRAVENOUS at 12:11

## 2020-01-01 RX ADMIN — MIDODRINE HYDROCHLORIDE 10 MG: 5 TABLET ORAL at 10:57

## 2020-01-01 RX ADMIN — MORPHINE SULFATE 2 MG: 2 INJECTION, SOLUTION INTRAMUSCULAR; INTRAVENOUS at 12:54

## 2020-01-01 RX ADMIN — MORPHINE SULFATE 1 MG: 2 INJECTION, SOLUTION INTRAMUSCULAR; INTRAVENOUS at 02:03

## 2020-01-01 RX ADMIN — MORPHINE SULFATE 2 MG: 2 INJECTION, SOLUTION INTRAMUSCULAR; INTRAVENOUS at 14:59

## 2020-01-01 RX ADMIN — DEXTROSE MONOHYDRATE 150 ML/HR: 50 INJECTION, SOLUTION INTRAVENOUS at 15:27

## 2020-01-01 RX ADMIN — SODIUM CHLORIDE, PRESERVATIVE FREE 10 ML: 5 INJECTION INTRAVENOUS at 21:09

## 2020-01-01 RX ADMIN — MORPHINE SULFATE 2 MG: 2 INJECTION, SOLUTION INTRAMUSCULAR; INTRAVENOUS at 04:56

## 2020-01-01 RX ADMIN — LORAZEPAM 1 MG: 2 INJECTION INTRAMUSCULAR; INTRAVENOUS at 12:54

## 2020-01-01 RX ADMIN — MORPHINE SULFATE 2 MG: 2 INJECTION, SOLUTION INTRAMUSCULAR; INTRAVENOUS at 09:55

## 2020-01-01 RX ADMIN — MORPHINE SULFATE 2 MG: 2 INJECTION, SOLUTION INTRAMUSCULAR; INTRAVENOUS at 17:45

## 2020-01-01 RX ADMIN — MIDODRINE HYDROCHLORIDE 10 MG: 5 TABLET ORAL at 06:46

## 2020-01-01 RX ADMIN — DEXTROSE MONOHYDRATE 150 ML/HR: 50 INJECTION, SOLUTION INTRAVENOUS at 03:42

## 2020-01-01 RX ADMIN — SODIUM CHLORIDE 100 ML/HR: 9 INJECTION, SOLUTION INTRAVENOUS at 00:18

## 2020-01-01 RX ADMIN — IPRATROPIUM BROMIDE AND ALBUTEROL SULFATE 3 ML: 2.5; .5 SOLUTION RESPIRATORY (INHALATION) at 15:30

## 2020-01-01 RX ADMIN — MORPHINE SULFATE 2 MG: 2 INJECTION, SOLUTION INTRAMUSCULAR; INTRAVENOUS at 00:43

## 2020-01-01 RX ADMIN — MORPHINE SULFATE 2 MG: 2 INJECTION, SOLUTION INTRAMUSCULAR; INTRAVENOUS at 15:46

## 2020-01-01 RX ADMIN — DEXTROSE MONOHYDRATE 150 ML/HR: 50 INJECTION, SOLUTION INTRAVENOUS at 20:02

## 2020-01-01 RX ADMIN — SODIUM CHLORIDE, PRESERVATIVE FREE 10 ML: 5 INJECTION INTRAVENOUS at 08:40

## 2020-01-01 RX ADMIN — CEFTRIAXONE SODIUM 1 G: 1 INJECTION, SOLUTION INTRAVENOUS at 14:52

## 2020-01-01 RX ADMIN — SODIUM CHLORIDE 1000 ML: 9 INJECTION, SOLUTION INTRAVENOUS at 12:04

## 2020-01-01 RX ADMIN — SODIUM CHLORIDE, PRESERVATIVE FREE 10 ML: 5 INJECTION INTRAVENOUS at 20:31

## 2020-01-01 RX ADMIN — SODIUM CHLORIDE, PRESERVATIVE FREE 10 ML: 5 INJECTION INTRAVENOUS at 08:06

## 2020-01-01 RX ADMIN — TETANUS TOXOID, REDUCED DIPHTHERIA TOXOID AND ACELLULAR PERTUSSIS VACCINE, ADSORBED 0.5 ML: 5; 2.5; 8; 8; 2.5 SUSPENSION INTRAMUSCULAR at 01:48

## 2020-01-01 RX ADMIN — MORPHINE SULFATE 2 MG: 2 INJECTION, SOLUTION INTRAMUSCULAR; INTRAVENOUS at 17:10

## 2020-01-01 RX ADMIN — IPRATROPIUM BROMIDE AND ALBUTEROL SULFATE 3 ML: 2.5; .5 SOLUTION RESPIRATORY (INHALATION) at 11:47

## 2020-01-01 RX ADMIN — SODIUM CHLORIDE, POTASSIUM CHLORIDE, SODIUM LACTATE AND CALCIUM CHLORIDE 1000 ML: 600; 310; 30; 20 INJECTION, SOLUTION INTRAVENOUS at 14:31

## 2020-01-01 RX ADMIN — LORAZEPAM 1 MG: 2 INJECTION INTRAMUSCULAR; INTRAVENOUS at 20:13

## 2020-01-01 RX ADMIN — LORAZEPAM 1 MG: 2 INJECTION INTRAMUSCULAR; INTRAVENOUS at 09:28

## 2020-01-01 RX ADMIN — ALBUMIN HUMAN 25 G: 0.25 SOLUTION INTRAVENOUS at 14:23

## 2020-01-01 RX ADMIN — MIDODRINE HYDROCHLORIDE 10 MG: 5 TABLET ORAL at 10:49

## 2020-01-01 RX ADMIN — LORAZEPAM 1 MG: 2 INJECTION INTRAMUSCULAR; INTRAVENOUS at 04:14

## 2020-01-01 RX ADMIN — MORPHINE SULFATE 1 MG: 2 INJECTION, SOLUTION INTRAMUSCULAR; INTRAVENOUS at 12:17

## 2020-01-01 RX ADMIN — SODIUM CHLORIDE, PRESERVATIVE FREE 10 ML: 5 INJECTION INTRAVENOUS at 09:29

## 2020-01-01 RX ADMIN — SODIUM CHLORIDE, PRESERVATIVE FREE 10 ML: 5 INJECTION INTRAVENOUS at 22:46

## 2020-01-01 RX ADMIN — SODIUM CHLORIDE 100 ML/HR: 9 INJECTION, SOLUTION INTRAVENOUS at 15:37

## 2020-01-01 RX ADMIN — SODIUM CHLORIDE, PRESERVATIVE FREE 10 ML: 5 INJECTION INTRAVENOUS at 20:03

## 2020-01-01 RX ADMIN — IPRATROPIUM BROMIDE AND ALBUTEROL SULFATE 3 ML: 2.5; .5 SOLUTION RESPIRATORY (INHALATION) at 15:19

## 2020-01-01 RX ADMIN — MORPHINE SULFATE 2 MG: 2 INJECTION, SOLUTION INTRAMUSCULAR; INTRAVENOUS at 20:13

## 2020-01-01 RX ADMIN — LORAZEPAM 1 MG: 2 INJECTION INTRAMUSCULAR; INTRAVENOUS at 09:54

## 2020-01-01 RX ADMIN — SODIUM CHLORIDE 1000 ML: 9 INJECTION, SOLUTION INTRAVENOUS at 12:28

## 2020-01-01 RX ADMIN — IPRATROPIUM BROMIDE AND ALBUTEROL SULFATE 3 ML: 2.5; .5 SOLUTION RESPIRATORY (INHALATION) at 19:59

## 2020-01-01 RX ADMIN — MIDODRINE HYDROCHLORIDE 10 MG: 5 TABLET ORAL at 14:53

## 2020-01-01 RX ADMIN — POTASSIUM CHLORIDE 10 MEQ: 7.46 INJECTION, SOLUTION INTRAVENOUS at 05:12

## 2020-01-01 RX ADMIN — MEGESTROL ACETATE 400 MG: 40 SUSPENSION ORAL at 10:56

## 2020-11-10 NOTE — ED TRIAGE NOTES
"To ER via EMS from [facility].  Pt fell out of bed per facility.  Pt has hematoma with abrasion to left temple.    Pt has hx of dementia normal A&O x 2 with occasional \"combative to self\" per facility.  Not on thinners.     Pt placed in mask at time of triage.  Triage staff in appropriate PPE.   "

## 2020-11-10 NOTE — ED NOTES
unable to obtain EKG d/t Pt's tremors and moving Lavinia LUCIANO notified      Dawn Crystal, RN  11/10/20 0038

## 2020-11-10 NOTE — ED PROVIDER NOTES
Subjective   87-year-old female with past medical history of dementia, hyperlipidemia here for chief complaint of a fall.  History was obtained from EMS.  The patient is demented and cannot provide much of a history.  Per EMS report, patient lives at a facility and suffered a fall.  She is not anticoagulated.  Patient did suffer an abrasion and was brought here for CT of the head.  The rest of the history is limited given that the patient is demented and cannot provide history.  Per EMS report, patient's baseline is being confused and this is not new and the patient is at baseline after the fall.          Review of Systems   Unable to perform ROS: Dementia       Past Medical History:   Diagnosis Date   • Anxiety    • Dementia (CMS/HCC)    • Depression    • Eustachian tube dysfunction    • Eye exam normal 2013   • Grief    • Hyperlipidemia    • Hypokalemia    • Mild cognitive impairment    • Sciatica    • Seasonal allergies        Allergies   Allergen Reactions   • Sulfa Antibiotics Other (See Comments)       Past Surgical History:   Procedure Laterality Date   • APPENDECTOMY     • EYE SURGERY Left 06/01/2015    Dr. Saldana   • HYSTERECTOMY     • KNEE SURGERY  2010    torn meniscus   • TONSILLECTOMY         Family History   Problem Relation Age of Onset   • Kidney disease Mother    • Nephrolithiasis Mother    • Diabetes Mother    • Heart disease Father    • Diabetes Other    • Breast cancer Sister    • Thyroid disease Brother        Social History     Socioeconomic History   • Marital status:      Spouse name: Not on file   • Number of children: Not on file   • Years of education: Not on file   • Highest education level: Not on file   Tobacco Use   • Smoking status: Never Smoker   • Smokeless tobacco: Never Used   Substance and Sexual Activity   • Alcohol use: No   • Drug use: No   • Sexual activity: Defer           Objective   Physical Exam  Constitutional:       General: She is not in acute distress.      Appearance: She is not toxic-appearing or diaphoretic.   HENT:      Head: Normocephalic.      Comments: Abrasion to the left side of head     Right Ear: External ear normal.      Left Ear: External ear normal.      Nose: Nose normal. No congestion or rhinorrhea.      Comments: No septal hematoma     Mouth/Throat:      Mouth: Mucous membranes are moist.      Pharynx: Oropharynx is clear. No oropharyngeal exudate or posterior oropharyngeal erythema.   Eyes:      General: No scleral icterus.        Right eye: No discharge.         Left eye: No discharge.      Extraocular Movements: Extraocular movements intact.      Conjunctiva/sclera: Conjunctivae normal.      Pupils: Pupils are equal, round, and reactive to light.   Neck:      Musculoskeletal: Normal range of motion and neck supple. No neck rigidity or muscular tenderness.   Cardiovascular:      Rate and Rhythm: Normal rate and regular rhythm.      Pulses: Normal pulses.      Heart sounds: Normal heart sounds. No murmur. No friction rub. No gallop.    Pulmonary:      Effort: Pulmonary effort is normal. No respiratory distress.      Breath sounds: Normal breath sounds. No wheezing or rales.   Chest:      Chest wall: No tenderness.   Abdominal:      General: Abdomen is flat. Bowel sounds are normal. There is no distension.      Palpations: Abdomen is soft. There is no mass.      Tenderness: There is no abdominal tenderness. There is no right CVA tenderness, left CVA tenderness, guarding or rebound.      Hernia: No hernia is present.   Musculoskeletal: Normal range of motion.         General: No swelling, tenderness, deformity or signs of injury.      Right lower leg: No edema.      Left lower leg: No edema.   Skin:     General: Skin is warm and dry.      Capillary Refill: Capillary refill takes less than 2 seconds.      Coloration: Skin is not jaundiced or pale.      Findings: No bruising, erythema, lesion or rash.   Neurological:      General: No focal deficit  present.      Mental Status: She is alert. Mental status is at baseline.      Cranial Nerves: No cranial nerve deficit.      Sensory: No sensory deficit.      Motor: No weakness.      Comments: Not oriented and confused, chronic per EMS report   Psychiatric:         Mood and Affect: Mood normal.         Procedures           ED Course  ED Course as of Nov 10 0217   Tue Nov 10, 2020   0217       IMPRESSION:  1.  No evidence of acute fracture or subluxation. Consider further  evaluation with MRI if symptoms persist.  2.  Multilevel degenerative changes, most pronounced at C5-C6 where  there is moderate spinal canal narrowing and severe left neural  foraminal narrowing. This can be further evaluated with MRI as  clinically indicated.     This report was finalized on 11/10/2020 1:48 AM by Dr. Fernando Kenny M.D.       [KS]   0217 IMPRESSION:  1.   No acute intracranial abnormality.   2.  Mild left frontal scalp swelling.  3.  Progressed moderate to severe atrophy, predominantly involving the  bilateral temporal lobes.  4.  Scattered white matter hypodensity compatible with chronic  microvascular ischemia.       [KS]      ED Course User Index  [KS] Elmo Villalpando MD                                           MDM  Number of Diagnoses or Management Options  Abrasion, scalp w/o infection:   Closed head injury, initial encounter:   Fall from bed, initial encounter:      Amount and/or Complexity of Data Reviewed  Tests in the radiology section of CPT®: ordered and reviewed    Risk of Complications, Morbidity, and/or Mortality  General comments: When I first saw the patient, the patient appeared nontoxic.  Patient was at baseline per EMS report.  Given that she suffered a fall and had abrasion to the left side of the head and she is demented and cannot provide much of a history, CT head and C-spine were done.  This was negative for an acute pathology.  These reports were sent with the patient to the facility for further work-up  in case the patient needs an MRI of the cervical spine.  The patient was given a tetanus vaccine x1 in the ED.  The patient's heart rate was charted as 110 however, when I looked at the monitor when I was looking at the patient it was not catching it accurately and the monitor was reading the number wrong.  Patient's heart rate was in the 70s.  Patient was talking and moving all 4 extremities without any difficulty.  Given that she fell and head CT is negative therefore I think a skull fracture, acute intracranial bleed is less likely.  At this point, I think patient is safe to return back to the facility given that she is at her baseline without any nausea or vomiting and has a negative head CT and CT C-spine for acute pathology.    Patient Progress  Patient progress: stable      Final diagnoses:   Fall from bed, initial encounter   Closed head injury, initial encounter   Abrasion, scalp w/o infection            Elmo Villalpando MD  11/10/20 0219

## 2020-11-10 NOTE — DISCHARGE INSTRUCTIONS
Home to rest  Continue current treatment  Return if worse or new concerns   Continue care with your primary care physician and have your blood pressure regularly checked and managed. Normal blood pressure is 120/80.

## 2020-11-10 NOTE — ED PROVIDER NOTES
" EMERGENCY DEPARTMENT ENCOUNTER    Room Number:  07/07  Date of encounter:  11/10/2020  PCP: Letty Anthony APRN  Historian: Patient, EMS   Full history not obtainable due to: Dementia     HPI:  Chief Complaint: head injury, fall     Context: Ann Marie Blanton is a 87 y.o. female who presents to the ED c/o fall from her bed according to nursing staff. She does not provide any history. She has a hx of dementia and can tell me her name. When asked \"Where are you?\" she responds \"Where you are\". The hx is very limited. Nursing home staff report she fell out of bed. She does not remember the fall. Does report that her back hurts.       PAST MEDICAL HISTORY    Active Ambulatory Problems     Diagnosis Date Noted   • Hyperlipidemia 10/28/2015   • Depression 10/28/2015   • Anxiety 10/28/2015   • Osteoarthritis of both knees 10/28/2015   • Abrasion of right side of back 08/23/2016   • ERRONEOUS ENCOUNTER--DISREGARD 05/16/2017   • Vasovagal syncope 04/23/2018   • Dementia (CMS/[facility]) 04/23/2018   • Depression 04/23/2018   • Heart murmur 04/23/2018   • DNR (do not resuscitate) 04/24/2018   • Acute cystitis without hematuria 04/24/2018     Resolved Ambulatory Problems     Diagnosis Date Noted   • No Resolved Ambulatory Problems     Past Medical History:   Diagnosis Date   • Eustachian tube dysfunction    • Eye exam normal 2013   • Grief    • Hypokalemia    • Mild cognitive impairment    • Sciatica    • Seasonal allergies          PAST SURGICAL HISTORY  Past Surgical History:   Procedure Laterality Date   • APPENDECTOMY     • EYE SURGERY Left 06/01/2015    Dr. Saldana   • HYSTERECTOMY     • KNEE SURGERY  2010    torn meniscus   • TONSILLECTOMY           FAMILY HISTORY  Family History   Problem Relation Age of Onset   • Kidney disease Mother    • Nephrolithiasis Mother    • Diabetes Mother    • Heart disease Father    • Diabetes Other    • Breast cancer Sister    • Thyroid disease Brother          SOCIAL HISTORY  Social " History     Socioeconomic History   • Marital status:      Spouse name: Not on file   • Number of children: Not on file   • Years of education: Not on file   • Highest education level: Not on file   Tobacco Use   • Smoking status: Never Smoker   • Smokeless tobacco: Never Used   Substance and Sexual Activity   • Alcohol use: No   • Drug use: No   • Sexual activity: Defer         ALLERGIES  Sulfa antibiotics        REVIEW OF SYSTEMS  Review of Systems   All systems reviewed and marked as negative except as listed in HPI       PHYSICAL EXAM    I have reviewed the triage vital signs and nursing notes.    ED Triage Vitals   Temp Heart Rate Resp BP SpO2   11/09/20 2137 11/09/20 2136 11/09/20 2136 11/09/20 2136 11/09/20 2136   97.3 °F (36.3 °C) 118 22 141/82 95 %      Temp src Heart Rate Source Patient Position BP Location FiO2 (%)   11/09/20 2136 11/09/20 2136 -- -- --   Tympanic Monitor          GENERAL: alert well developed, well nourished in no distress  HENT: NC, left parietal scalp hematoma with overlying abrasion, neck supple, trachea midline  EYES: no scleral icterus, PERRL, normal conjunctivae  CV: regular rhythm, regular rate, no murmur  RESPIRATORY: unlabored effort, CTAB  ABDOMEN: soft, nontender, nondistended, bowel sounds present  MUSCULOSKELETAL: no gross deformity. No focal tenderness of extremities or spine.   NEURO: alert, moves all extremities, oriented to person. Resting tremor noted.   SKIN: warm, dry, no rash  PSYCH:  Appropriate mood and affect    Vital signs and nursing notes reviewed.          RADIOLOGY  Ct Head Without Contrast    Result Date: 11/9/2020  CT HEAD WITHOUT CONTRAST:  HISTORY:  Head trauma, fall.  TECHNIQUE:  Axial images were obtained through the brain without contrast administration. Multiplanar reformatted images were reconstructed from the helical source data. Radiation dose reduction techniques were utilized, including automated exposure control and exposure modulation  based on body size.   COMPARISON: CT head 04/23/2019.  FINDINGS: Moderate to severe progressive cerebral volume loss with proportionate prominence of ventricular system and sulci. Atrophy is most pronounced in the bilateral temporal lobes. No hydrocephalus or midline shift.  Mild scattered hypodensity throughout the periventricular and subcortical white matter that is most likely secondary to chronic microvascular ischemia. There is no evidence of a large territorial infarction. No hemorrhage or extra-axial fluid collection. Intracranial atherosclerotic arterial calcifications.   The orbits demonstrate prior bilateral lens surgery. The visualized paranasal sinuses and mastoid air cells are clear.  Chronic left nasal bone fracture. The calvarium is intact. Small region of left frontal scalp soft tissue swelling measuring 3 mm in greatest width.      1.   No acute intracranial abnormality. 2.  Mild left frontal scalp swelling. 3.  Progressed moderate to severe atrophy, predominantly involving the bilateral temporal lobes. 4.  Scattered white matter hypodensity compatible with chronic microvascular ischemia.    This report was finalized on 11/9/2020 10:44 PM by Dr. Fernando Kenny M.D.      Ct Cervical Spine Without Contrast    Result Date: 11/10/2020  CERVICAL SPINE CT:  HISTORY:  Head injury, rule out refractory injury.  TECHNIQUE:  Helical axial images were performed from the skull base through the upper thoracic spine without contrast. Sagittal and coronal reformatted images were performed. Radiation dose reduction techniques were utilized, including automated exposure control and exposure modulation based on body size.   COMPARISON:  None.  FINDINGS:  Osseous structures appear osteopenic. Cervical dextrocurvature. No acute fracture or subluxation... No sclerotic or lytic lesions.  Portable discogenic and osseous degenerative changes, most pronounced at C5-C6. Disc osteophyte complexes contribute to moderate spinal  canal narrowing at C5-C6 and C6-C7. Uncovertebral hypertrophy and facet arthropathy contribute to severe left neural foraminal narrowing at C5-C6 and multilevel moderate neural foraminal narrowing.  The paravertebral soft tissues are normal. No paraspinal mass.  The partially visualized lungs are clear.       1.  No evidence of acute fracture or subluxation. Consider further evaluation with MRI if symptoms persist. 2.  Multilevel degenerative changes, most pronounced at C5-C6 where there is moderate spinal canal narrowing and severe left neural foraminal narrowing. This can be further evaluated with MRI as clinically indicated.  This report was finalized on 11/10/2020 1:48 AM by Dr. Fernando Kenny M.D.        I ordered the above noted radiological studies. Independently reviewed by me and discussed with radiologist.  See dictation above for official radiology interpretation.      PROCEDURES    Procedures        MEDICATIONS GIVEN IN ER    Medications   Tdap (BOOSTRIX) injection 0.5 mL (0.5 mL Intramuscular Given 11/10/20 0148)         PROGRESS, DATA ANALYSIS, CONSULTS, AND MEDICAL DECISION MAKING    All labs have been independently reviewed by me.  All radiology studies have been reviewed by me.   EKG's independently reviewed by me.  Discussion below represents my analysis of pertinent findings related to patient's condition, differential diagnosis, treatment plan and final disposition.    DIFFERENTIAL DIAGNOSIS INCLUDE BUT NOT LIMITED TO: SAH, SDH, skull fracture, abrasion, hematoma, contusion, post traumatic headache, scalp laceration    ED Course as of Nov 10 0330   Tue Nov 10, 2020   0045 CT imaging of the cervical spine was obtained given that the patient does have dementia, is confused and has trauma to the scalp noted.  Plan for imaging to rule out refractory injury.    [JS]   0100 I viewed head CT on PACS system.  My findings are no midline shift.  No skull fracture.    [JS]   0217       IMPRESSION:  1.  No  evidence of acute fracture or subluxation. Consider further  evaluation with MRI if symptoms persist.  2.  Multilevel degenerative changes, most pronounced at C5-C6 where  there is moderate spinal canal narrowing and severe left neural  foraminal narrowing. This can be further evaluated with MRI as  clinically indicated.     This report was finalized on 11/10/2020 1:48 AM by Dr. Fernando Kenny M.D.       [KS]   0217 IMPRESSION:  1.   No acute intracranial abnormality.   2.  Mild left frontal scalp swelling.  3.  Progressed moderate to severe atrophy, predominantly involving the  bilateral temporal lobes.  4.  Scattered white matter hypodensity compatible with chronic  microvascular ischemia.       [KS]      ED Course User Index  [JS] Lavinia Johnson, APRN  [KS] Elmo Villalpando MD       BP - 141/82  HR - 110  TEMP - 97.3 °F (36.3 °C) (Tympanic)  02 SATS - 96%          DIAGNOSIS  Final diagnoses:   Fall from bed, initial encounter   Closed head injury, initial encounter   Abrasion, scalp w/o infection         DISPOSITION  Discharge     Pt masked in first look. I wore a surgical mask and protective eye wear throughout my encounters with the pt. I performed hand hygiene on entry into the pt room and upon exit.     Dictated utilizing Dragon dictation:  Much of this encounter note is an electronic transcription/translation of spoken language to printed text. The electronic translation of spoken language may permit erroneous, or at times, nonsensical words or phrases to be inadvertently transcribed; Although I have reviewed the note for such errors, some may still exist.       Lavinia Johnson APRN  11/10/20 0329       Lavinia Johnson APRN  11/10/20 0330

## 2020-11-14 NOTE — ED NOTES
"Pt to triage from [facility] via [facility] [phone] with c/o fall. EMS states staff (CMT) called for \"hypoxia and cyanosis\".  Pad placed on floor next to bed, and pt rolled out of her bed onto the pad (approximately 6 in fall).  No LOC (pt has dementia and is at baseline), denies hitting head.  Pt provided with mask at triage.  Triage personnel wore appropriate PPE, mask, gloves and goggles         Yoana Corral RN  11/14/20 0529    "

## 2020-11-14 NOTE — ED PROVIDER NOTES
The MOHIT and I have discussed this patient's history, physical exam, and treatment plan. I have reviewed the documentation and personally had a face to face interaction with the patient  I affirm the documentation and agree with the treatment and plan.  The following describes my personal findings.    The patient presents brought by EMS from [facility] with report of rolled out of bed.  Patient unable to give the events of the fall.  No report of cough, cold, fevers, vomiting.    Limited physical exam:  Patient is nontoxic appearing awake, alert, disoriented, unable to answer questions, mumbling nonsensically, thin  Lungs/cardiovascular: No respiratory distress, lungs good air movement bilaterally, cardiovascular regular rate nontachycardic,  Abdomen: Soft, nontender  Back/extremities: Good range of motion, pulse, sensation x4 extremities without pain, multicolored bruising to right upper arm and bilateral knees and shins        Patient was wearing facemask when I entered the room and throughout our encounter. Full protective equipment was worn throughout this patient encounter including a face mask, eye protection and gloves. Hand hygiene was performed before donning protective equipment and after removal when leaving the room.           Janice Baeza MD  11/14/20 4567

## 2020-11-14 NOTE — ED PROVIDER NOTES
EMERGENCY DEPARTMENT ENCOUNTER    Room Number:  12/12  Date of encounter:  11/14/2020  PCP: Josh Gross MD  Historian: EMS report      PPE    Patient was placed in face mask in first look. Patient was wearing facemask when I entered the room and throughout our encounter. I wore full protective equipment throughout this patient encounter including a face mask, and gloves. Hand hygiene was performed before donning protective equipment and after removal when leaving the room.        HPI:  Chief Complaint: Fall  A complete HPI/ROS/PMH/PSH/SH/FH are unobtainable due to: Patient has history of dementia    Context: Ann Marie Blanton is a 87 y.o. female who arrives to the ED via EMS from [facility] [place].  Per report patient rolled out of bed onto a pad that was on the floor.  Patient has dementia and is unable to provide details of the fall or review of symptoms.      PAST MEDICAL HISTORY  Active Ambulatory Problems     Diagnosis Date Noted   • Hyperlipidemia 10/28/2015   • Depression 10/28/2015   • Anxiety 10/28/2015   • Osteoarthritis of both knees 10/28/2015   • Abrasion of right side of back 08/23/2016   • ERRONEOUS ENCOUNTER--DISREGARD 05/16/2017   • Vasovagal syncope 04/23/2018   • Dementia (CMS/[facility]) 04/23/2018   • Depression 04/23/2018   • Heart murmur 04/23/2018   • DNR (do not resuscitate) 04/24/2018   • Acute cystitis without hematuria 04/24/2018     Resolved Ambulatory Problems     Diagnosis Date Noted   • No Resolved Ambulatory Problems     Past Medical History:   Diagnosis Date   • Eustachian tube dysfunction    • Eye exam normal 2013   • Grief    • Hypokalemia    • Mild cognitive impairment    • Sciatica    • Seasonal allergies          PAST SURGICAL HISTORY  Past Surgical History:   Procedure Laterality Date   • APPENDECTOMY     • EYE SURGERY Left 06/01/2015    Dr. Saldana   • HYSTERECTOMY     • KNEE SURGERY  2010    torn meniscus   • TONSILLECTOMY           FAMILY HISTORY  Family History    Problem Relation Age of Onset   • Kidney disease Mother    • Nephrolithiasis Mother    • Diabetes Mother    • Heart disease Father    • Diabetes Other    • Breast cancer Sister    • Thyroid disease Brother          SOCIAL HISTORY  Social History     Socioeconomic History   • Marital status:      Spouse name: Not on file   • Number of children: Not on file   • Years of education: Not on file   • Highest education level: Not on file   Tobacco Use   • Smoking status: Never Smoker   • Smokeless tobacco: Never Used   Substance and Sexual Activity   • Alcohol use: No   • Drug use: No   • Sexual activity: Defer         ALLERGIES  Sulfa antibiotics        REVIEW OF SYSTEMS  Review of Systems     Unable to obtain secondary to patient's history of dementia       PHYSICAL EXAM    ED Triage Vitals   Temp Heart Rate Resp BP SpO2   11/14/20 0556 11/14/20 0530 11/14/20 0530 11/14/20 0530 11/14/20 0530   97.8 °F (36.6 °C) 56 18 143/79 97 %       Physical Exam  GENERAL: Chronically ill-appearing, non-toxic appearing, not distressed  HENT: normocephalic, atraumatic  EYES: no scleral icterus, PERRL  CV: regular rhythm, tachycardic, no murmur  RESPIRATORY: normal effort, CTAB  ABDOMEN: soft   MUSCULOSKELETAL:  Patient did moan when C-spine palpated, no moaning or grimacing when T or L-spine palpated  NEURO: alert, mental status normal/baseline  SKIN: warm, dry, no rash     Nursing notes and vital signs reviewed    RADIOLOGY  Ct Head Without Contrast    Result Date: 11/14/2020  CT SCAN OF THE BRAIN WITHOUT CONTRAST  HISTORY: Fell. Confusion.  The CT scan was performed as an emergency at C2 through the brain without contrast. There is prominent diffuse atrophy and chronic small vessel ischemic change similar to the recent study performed 5 days ago. There is no evidence of acute intracranial hemorrhage or mass effect and the visualized sinuses are clear.  CT SCAN OF CERVICAL SPINE  HISTORY: Fell. Neck pain.  The CT scan was  performed as an emergency procedure through the cervical spine and demonstrates the followin. There is no evidence of acute fracture with particular reference to the odontoid. 2. There are scattered changes in the cervical spine with spurring of the lateral facets and uncovertebral joints. There is some associated bony foraminal encroachment, particularly at C5-6 on the left and this is similar to the recent CT scan performed 4 days ago.  Radiation dose reduction techniques were utilized, including automated exposure control and exposure modulation based on body size.  This report was finalized on 2020 8:36 AM by Dr. Luis Felipe Lemus M.D.      Ct Cervical Spine Without Contrast    Result Date: 2020  CT SCAN OF THE BRAIN WITHOUT CONTRAST  HISTORY: Fell. Confusion.  The CT scan was performed as an emergency at C2 through the brain without contrast. There is prominent diffuse atrophy and chronic small vessel ischemic change similar to the recent study performed 5 days ago. There is no evidence of acute intracranial hemorrhage or mass effect and the visualized sinuses are clear.  CT SCAN OF CERVICAL SPINE  HISTORY: Fell. Neck pain.  The CT scan was performed as an emergency procedure through the cervical spine and demonstrates the followin. There is no evidence of acute fracture with particular reference to the odontoid. 2. There are scattered changes in the cervical spine with spurring of the lateral facets and uncovertebral joints. There is some associated bony foraminal encroachment, particularly at C5-6 on the left and this is similar to the recent CT scan performed 4 days ago.  Radiation dose reduction techniques were utilized, including automated exposure control and exposure modulation based on body size.  This report was finalized on 2020 8:36 AM by Dr. Luis Felipe Lemus M.D.        I ordered the above noted radiological studies and viewed the images on the PACS system.         MEDICAL  RECORD REVIEW  Medical records reviewed in epic, patient was last here November 10, 2020 after a fall at the nursing home also.      PROCEDURES    Procedures        DIFFERENTIAL DIAGNOSIS  Differential Diagnosis for head injury include but are not limited to the following:  Cerebral contusion, Concussion ( with or without LOC), Head contusion, Skull fracture,  Hematoma, Intracranial Hemorrhage, Laceration, cervical strain      PROGRESS, DATA ANALYSIS, CONSULTS, AND MEDICAL DECISION MAKING        ED Course as of Nov 14 1045   Sat Nov 14, 2020   0732  Discussed with Dr. Lemus regarding the CT head and C-spine results which revealed both are negative acute        [MS]   0734 Reviewed pt's history and workup with Dr. Baeza.  After a bedside evaluation, they agree with the plan of care.          [MS]   0735   Imaging:  CT head: Negative acute  CT C-spine: Negative acute    Given history, exam and work-up low suspicious for intracranial hemorrhage, skull fracture, spine fracture or other acute spinal injuries, or extremity fracture.  Report will be called back to the nursing home.  Patient will be discharged home with strict return to ER precautions and instructions to follow-up with PMD as needed.        [MS]      ED Course User Index  [MS] Paula Gastelum APRN     Discussed plan for discharge, as there is no emergent indication for admission.    Patient discharged in stable condition.    DIAGNOSIS  Final diagnoses:   Contusion of head, unspecified part of head, initial encounter   Strain of neck muscle, initial encounter       FOLLOW UP   Josh Gross MD  [facility]  [address] [place] [id number] [phone]    Schedule an appointment as soon as possible for a visit in 1 week        RX     Medication List      No changes were made to your prescriptions during this visit.             MEDICATIONS GIVEN IN ED    Medications - No data to display        COURSE &  MEDICAL DECISION MAKING  Any/All labs and Any/All Imaging studies that were ordered were reviewed and are noted above.  Results were reviewed/discussed with the patient and they were also made aware of online assess.   Pt also made aware that some labs, such as cultures, will not be resulted during ER visit and follow up with PMD is necessary.        Paula Gastelum, APRN  11/14/20 1045

## 2020-11-14 NOTE — ED NOTES
Pt in mask but pulls it off due to dementia. Nurse in mask and face shield. Handwashing done before and after all pt contact.     Nikki Bartlett RN  11/14/20 4084

## 2020-11-14 NOTE — DISCHARGE INSTRUCTIONS
Follow up with your primary care doctor within 48-72 hours. Rest. Take Acetaminophen (Tylenol) every 4-6 hours, as needed, for pain. Often individuals develop a headache associated with nausea in the days/hours after a head injury. This is called a concussion and does not warrant a return to the ED UNLESS: you develop significant worsening of pain, profuse vomiting, dizziness, changes in vision, difficulty walking/speaking, weakness or numbness to your extremities.

## 2020-11-14 NOTE — PROGRESS NOTES
Late entry- 1013- Primary RN Hemant notified me that she had called [facility] at 0830 this morning and left message w/answering service for transport of patient back to [facility] without a call back. Call placed to [facility] to notify-     Confirmed transport information w/Toribio Turpin upon return call from him. Awaiting transport. Updated primary RN. Ann Marie Bashir RN

## 2020-11-15 PROBLEM — R41.82 ALTERED MENTAL STATUS: Status: ACTIVE | Noted: 2020-01-01

## 2020-11-15 NOTE — ED PROVIDER NOTES
EMERGENCY DEPARTMENT ENCOUNTER    Room Number:  29/29  Date of encounter:  11/15/2020  PCP: Josh Gross MD  Historian: EMS      HPI:  Chief Complaint: Confusion  A complete HPI/ROS/PMH/PSH/SH/FH are unobtainable due to: Confusion    Context: Ann Marie Blanton is a 87 y.o. female who presents to the ED c/o confusion.  Patient was seen in the ER yesterday after a fall.  She was discharged home per EMS to her living facility.  When the nursing team saw the patient today, they noticed that she was nonverbal which is not her normal self.  She is normally verbal but very demented at baseline.  On EMS arrival, systolic blood pressure was in the 50s.  EMS provided 500 cc of IV fluid prehospital.  Glucose was greater than 200.      PAST MEDICAL HISTORY  Active Ambulatory Problems     Diagnosis Date Noted   • Hyperlipidemia 10/28/2015   • Depression 10/28/2015   • Anxiety 10/28/2015   • Osteoarthritis of both knees 10/28/2015   • Abrasion of right side of back 08/23/2016   • ERRONEOUS ENCOUNTER--DISREGARD 05/16/2017   • Vasovagal syncope 04/23/2018   • Dementia (CMS/[facility]) 04/23/2018   • Depression 04/23/2018   • Heart murmur 04/23/2018   • DNR (do not resuscitate) 04/24/2018   • Acute cystitis without hematuria 04/24/2018     Resolved Ambulatory Problems     Diagnosis Date Noted   • No Resolved Ambulatory Problems     Past Medical History:   Diagnosis Date   • Eustachian tube dysfunction    • Eye exam normal 2013   • Grief    • Hypokalemia    • Mild cognitive impairment    • Sciatica    • Seasonal allergies          PAST SURGICAL HISTORY  Past Surgical History:   Procedure Laterality Date   • APPENDECTOMY     • EYE SURGERY Left 06/01/2015    Dr. Saldana   • HYSTERECTOMY     • KNEE SURGERY  2010    torn meniscus   • TONSILLECTOMY           FAMILY HISTORY  Family History   Problem Relation Age of Onset   • Kidney disease Mother    • Nephrolithiasis Mother    • Diabetes Mother    • Heart disease Father    • Diabetes Other     • Breast cancer Sister    • Thyroid disease Brother          SOCIAL HISTORY  Social History     Socioeconomic History   • Marital status:      Spouse name: Not on file   • Number of children: Not on file   • Years of education: Not on file   • Highest education level: Not on file   Tobacco Use   • Smoking status: Never Smoker   • Smokeless tobacco: Never Used   Substance and Sexual Activity   • Alcohol use: No   • Drug use: No   • Sexual activity: Defer         ALLERGIES  Sulfa antibiotics        REVIEW OF SYSTEMS  Review of Systems     All systems reviewed and negative except for those discussed in HPI.       PHYSICAL EXAM    I have reviewed the triage vital signs and nursing notes.    ED Triage Vitals [11/15/20 1309]   Temp Pulse Resp BP SpO2   -- -- -- (!) 89/80 --      Temp src Heart Rate Source Patient Position BP Location FiO2 (%)   -- -- Lying -- --       Physical Exam  GENERAL: distressed, cachectic  HENT: nares patent, mucous membranes are very dry, scalp is atraumatic  EYES: no scleral icterus  CV: regular rhythm, regular rate  RESPIRATORY: normal effort, clear to auscultation bilaterally  ABDOMEN: soft, nontender, scaphoid abdomen  MUSCULOSKELETAL: no deformity, no palpation of the extremities although there is some questionable pain with passive range of motion of the left hip  NEURO: Awake, nonverbal, moans, minimal movement of her 4 extremities noted, does not follow commands  SKIN: warm, dry        LAB RESULTS  Recent Results (from the past 24 hour(s))   Light Blue Top    Collection Time: 11/15/20  1:11 PM   Result Value Ref Range    Extra Tube hold for add-on    Green Top (Gel)    Collection Time: 11/15/20  1:11 PM   Result Value Ref Range    Extra Tube Hold for add-ons.    Lavender Top    Collection Time: 11/15/20  1:11 PM   Result Value Ref Range    Extra Tube hold for add-on    Gold Top - SST    Collection Time: 11/15/20  1:11 PM   Result Value Ref Range    Extra Tube Hold for add-ons.     Comprehensive Metabolic Panel    Collection Time: 11/15/20  1:11 PM    Specimen: Blood   Result Value Ref Range    Glucose 192 (H) 65 - 99 mg/dL    BUN 81 (H) 8 - 23 mg/dL    Creatinine 2.50 (H) 0.57 - 1.00 mg/dL    Sodium 172 (C) 136 - 145 mmol/L    Potassium 4.4 3.5 - 5.2 mmol/L    Chloride 128 (H) 98 - 107 mmol/L    CO2 25.0 22.0 - 29.0 mmol/L    Calcium 9.2 8.6 - 10.5 mg/dL    Total Protein 6.5 6.0 - 8.5 g/dL    Albumin 3.10 (L) 3.50 - 5.20 g/dL    ALT (SGPT) 13 1 - 33 U/L    AST (SGOT) 11 1 - 32 U/L    Alkaline Phosphatase 63 39 - 117 U/L    Total Bilirubin 0.4 0.0 - 1.2 mg/dL    eGFR Non African Amer 18 (L) >60 mL/min/1.73    Globulin 3.4 gm/dL    A/G Ratio 0.9 g/dL    BUN/Creatinine Ratio 32.4 (H) 7.0 - 25.0    Anion Gap 19.0 (H) 5.0 - 15.0 mmol/L   Lipase    Collection Time: 11/15/20  1:11 PM    Specimen: Blood   Result Value Ref Range    Lipase 38 13 - 60 U/L   Troponin    Collection Time: 11/15/20  1:11 PM    Specimen: Blood   Result Value Ref Range    Troponin T 0.080 (C) 0.000 - 0.030 ng/mL   Procalcitonin    Collection Time: 11/15/20  1:11 PM    Specimen: Blood   Result Value Ref Range    Procalcitonin 0.34 (H) 0.00 - 0.25 ng/mL   T4, Free    Collection Time: 11/15/20  1:11 PM    Specimen: Blood   Result Value Ref Range    Free T4 1.10 0.93 - 1.70 ng/dL   TSH    Collection Time: 11/15/20  1:11 PM    Specimen: Blood   Result Value Ref Range    TSH 1.690 0.270 - 4.200 uIU/mL   CK    Collection Time: 11/15/20  1:11 PM    Specimen: Blood   Result Value Ref Range    Creatine Kinase 23 20 - 180 U/L   Magnesium    Collection Time: 11/15/20  1:11 PM    Specimen: Blood   Result Value Ref Range    Magnesium 2.8 (H) 1.6 - 2.4 mg/dL   CBC Auto Differential    Collection Time: 11/15/20  1:11 PM    Specimen: Blood   Result Value Ref Range    WBC 33.98 (C) 3.40 - 10.80 10*3/mm3    RBC 5.43 (H) 3.77 - 5.28 10*6/mm3    Hemoglobin 14.5 12.0 - 15.9 g/dL    Hematocrit 44.1 34.0 - 46.6 %    MCV 81.2 79.0 - 97.0 fL    MCH  26.7 26.6 - 33.0 pg    MCHC 32.9 31.5 - 35.7 g/dL    RDW 14.4 12.3 - 15.4 %    RDW-SD 41.2 37.0 - 54.0 fl    MPV 10.1 6.0 - 12.0 fL    Platelets 337 140 - 450 10*3/mm3   Manual Differential    Collection Time: 11/15/20  1:11 PM    Specimen: Blood   Result Value Ref Range    Neutrophil % 93.0 (H) 42.7 - 76.0 %    Lymphocyte % 5.0 (L) 19.6 - 45.3 %    Monocyte % 2.0 (L) 5.0 - 12.0 %    Neutrophils Absolute 31.60 (H) 1.70 - 7.00 10*3/mm3    Lymphocytes Absolute 1.70 0.70 - 3.10 10*3/mm3    Monocytes Absolute 0.68 0.10 - 0.90 10*3/mm3    Shashi Cells Mod/2+ None Seen    Ovalocytes Slight/1+ None Seen    Poikilocytes Large/3+ None Seen    Polychromasia Slight/1+ None Seen    WBC Morphology Normal Normal    Platelet Morphology Normal Normal   Lactic Acid, Plasma    Collection Time: 11/15/20  1:25 PM    Specimen: Blood   Result Value Ref Range    Lactate 3.9 (C) 0.5 - 2.0 mmol/L   Blood Gas, Arterial -    Collection Time: 11/15/20  1:27 PM    Specimen: Arterial Blood   Result Value Ref Range    Site Arterial: left brachial     Hector's Test N/A     pH, Arterial 7.401 7.350 - 7.450 pH units    pCO2, Arterial 36.5 35.0 - 45.0 mm Hg    pO2, Arterial 91.0 80.0 - 100.0 mm Hg    HCO3, Arterial 22.6 22.0 - 28.0 mmol/L    Base Excess, Arterial -1.8 (L) 0.0 - 2.0 mmol/L    O2 Saturation Calculated 97.1 92.0 - 99.0 %    Barometric Pressure for Blood Gas 745.9 mmHg    Modality Room Air     Set Mech Resp Rate 20    Urinalysis With Microscopic If Indicated (No Culture) - Urine, Catheter    Collection Time: 11/15/20  1:47 PM    Specimen: Urine, Catheter   Result Value Ref Range    Color, UA Dark Yellow (A) Yellow, Straw    Appearance, UA Turbid (A) Clear    pH, UA 6.0 5.0 - 8.0    Specific Gravity, UA 1.018 1.005 - 1.030    Glucose, UA Negative Negative    Ketones, UA Trace (A) Negative    Bilirubin, UA Negative Negative    Blood, UA Large (3+) (A) Negative    Protein, UA >=300 mg/dL (3+) (A) Negative    Leuk Esterase, UA Large (3+) (A)  Negative    Nitrite, UA Positive (A) Negative    Urobilinogen, UA 1.0 E.U./dL 0.2 - 1.0 E.U./dL   Urinalysis, Microscopic Only - Urine, Catheter    Collection Time: 11/15/20  1:47 PM    Specimen: Urine, Catheter   Result Value Ref Range    RBC, UA Unable to determine due to loaded field (A) None Seen, 0-2 /HPF    WBC, UA Too Numerous to Count (A) None Seen, 0-2 /HPF    Bacteria, UA 4+ (A) None Seen /HPF    Squamous Epithelial Cells, UA Unable to determine due to loaded field (A) None Seen, 0-2 /HPF    Hyaline Casts, UA Unable to determine due to loaded field None Seen /LPF    Calcium Oxalate Crystals, UA Small/1+ None Seen /HPF    Methodology Manual Light Microscopy    ECG 12 Lead    Collection Time: 11/15/20  2:07 PM   Result Value Ref Range    QT Interval 389 ms       Ordered the above labs and independently reviewed the results.        RADIOLOGY  Xr Chest 1 View    Result Date: 11/15/2020  XR CHEST 1 VW-  HISTORY:  Altered mental status.  COMPARISON:  Chest radiograph 04/03/2016.  FINDINGS:  2 views of the chest were obtained. The cardiac silhouette and mediastinal and hilar contours are within normal limits. There is calcific aortic atherosclerosis. There are nodular opacities projecting over the mid to upper left lung and lateral left lung base, not clearly present on prior radiographs from 2016. Recommend further evaluation with CT chest. There is no focal consolidation. Pleural spaces are clear. There is osseous demineralization. There is multilevel degenerative disc disease. There is mild asymmetric elevation of the left hemidiaphragm, which is new.  This report was finalized on 11/15/2020 2:13 PM by Dr. Dahlia Blackburn M.D.      Xr Hip With Or Without Pelvis 2 - 3 View Left    Result Date: 11/15/2020  PROCEDURE:  XR HIP W OR WO PELVIS 2-3 VIEW LEFT-  HISTORY: Left hip pain after fall.  COMPARISON: None.  FINDINGS: 5 views of the pelvis and left hip were obtained. There is degenerative disc disease in the  lower lumbar spine. There is diffuse osseous demineralization, which limits evaluation of fine osseous detail. Within these limitations, no acute fracture is identified. There is moderate left and mild hip osteoarthritis with joint space narrowing, subchondral sclerosis, and marginal osteophytes. There is no dislocation. There is calcific atherosclerosis. If there is persistent clinical concern for acute fracture or inability to bear weight, further evaluation with cross-sectional imaging would be warranted.  This report was finalized on 11/15/2020 2:16 PM by Dr. Dahlia Blackburn M.D.        I ordered the above noted radiological studies. Reviewed by me and discussed with radiologist.  See dictation for official radiology interpretation.      PROCEDURES    Critical Care  Performed by: Jace Macias II, MD  Authorized by: Jace Macias II, MD     Critical care provider statement:     Critical care time (minutes):  40    Critical care was necessary to treat or prevent imminent or life-threatening deterioration of the following conditions:  CNS failure or compromise, sepsis and shock    Critical care was time spent personally by me on the following activities:  Ordering and performing treatments and interventions, ordering and review of laboratory studies, ordering and review of radiographic studies, re-evaluation of patient's condition, pulse oximetry, obtaining history from patient or surrogate, discussions with consultants, evaluation of patient's response to treatment, examination of patient and development of treatment plan with patient or surrogate          MEDICATIONS GIVEN IN ER    Medications   sodium chloride 0.9 % flush 10 mL (has no administration in time range)   sodium chloride 0.9 % flush 10 mL (has no administration in time range)   cefTRIAXone (ROCEPHIN) IVPB 1 g (has no administration in time range)   lactated ringers bolus 1,000 mL (has no administration in time range)   lactated ringers  bolus 1,000 mL (0 mL Intravenous Stopped 11/15/20 1327)         PROGRESS, DATA ANALYSIS, CONSULTS, AND MEDICAL DECISION MAKING    All labs have been independently reviewed by me.  All radiology studies have been reviewed by me and discussed with radiologist dictating the report.   EKG's independently viewed and interpreted by me.  Discussion below represents my analysis of pertinent findings related to patient's condition, differential diagnosis, treatment plan and final disposition.    Differential diagnosis for the patient's hypertension includes infection, dehydration given the patient's very dry mucous membranes, bleeding due to her recent trauma.  Additionally, given the patient's altered mental status I am going to evaluate her electrolytes, look for signs of infection, and rescan her head.    ED Course as of Nov 15 1434   Sun Nov 15, 2020   1315 On medical chart review, patient was seen in the ER yesterday.  She was seen after a fall [facility].  She will out of bed onto the pad the floor.  She has dementia.    Additional information obtained by EMS.  EMS states that patient is normally demented at baseline but is verbal.  On EMS arrival, her systolic blood pressure was in the 50s systolic.    [TD]   1359 Lactate(!!): 3.9 [TD]   1412 Leukocytes, UA(!): Large (3+) [TD]   1412 Nitrite, UA(!): Positive [TD]   1412 WBC(!!): 33.98 [TD]   1413 EKG interpreted by myself.  Time 1407.  Sinus rhythm.  Heart 107.  Low voltage in the limb leads.  Right axis deviation.  Nonspecific T wave changes.    [TD]   1414 On medical chart review, old EKG obtained from 7/14/2018.  Sinus rhythm.  Heart rate 69.  Right axis deviation.  T wave inversion in lead aVL.    [TD]   1414 Sodium(!!): 172 [TD]   1414 Creatinine(!): 2.50 [TD]   1415 Hematocrit: 44.1 [TD]   1415 Hemoglobin: 14.5 [TD]      ED Course User Index  [TD] Jace Macias II, MD     Due to the patient's significant hypotension and dehydration, there was  a delay in obtaining the patient's blood cultures.    I discussed the case with Dr. Schmidt, pulmonology.  We reviewed the patient's labs and imaging.  He will admit to the ICU.    PPE: Both the patient and I wore a surgical mask throughout the entire patient encounter. I wore protective goggles.     AS OF 14:34 EST VITALS:    BP - 123/77  HR - 101  TEMP - 96.9 °F (36.1 °C) (Rectal)  O2 SATS - 96%        DIAGNOSIS  Final diagnoses:   Altered mental status, unspecified altered mental status type   Hypernatremia   SIMA (acute kidney injury) (CMS/HCC)   Septic shock (CMS/HCC)   Urinary tract infection with hematuria, site unspecified         DISPOSITION  Admit           Jace Macias II, MD  11/15/20 [id number]

## 2020-11-15 NOTE — H&P
[facility]    Patient Care Team:  Josh Gross MD as PCP - General (Family Medicine)    CC: Altered mental status    HPI: History obtained by chart review as well as discussion with the ER staff as patient is unable to provide any history.      87-year-old elderly cachectic white female with a past medical history significant for severe dementia, hypertension who was sent by assisted living facility due to persistent altered mental status which is significantly worse than her baseline mental status after being recently evaluated in the ER the day before for fall with no obvious injury based on work-up.  Patient is unable to provide any history at this point and there is no family members available at this point to get more information.  She was noted to be hypotensive initially which responded well to IV fluid hydration.  She was noted to have several life-threatening electrolyte abnormalities and hence we have been asked to evaluate the patient and admit to the ICU.    I have reviewed (if any available) last discharge summaries as well as the outpatient notes from the patients other consultants available in the [facility] system as well previous notes from our group and summarized above    Objective     I have discussed the case with ED physician     Records Reviewed  Old medical records.  Nursing notes.  Previous radiology studies.    Review of Systems:  Unable to obtain due to encephalopathy    Past Medical History:   Diagnosis Date   • Anxiety    • Dementia (CMS/HCC)    • Depression    • Eustachian tube dysfunction    • Eye exam normal 2013   • Grief    • Hyperlipidemia    • Hypokalemia    • Mild cognitive impairment    • Sciatica    • Seasonal allergies        Past Surgical History:   Procedure Laterality Date   • APPENDECTOMY     • EYE SURGERY Left 06/01/2015    Dr. Saldana   • HYSTERECTOMY     • KNEE SURGERY  2010    torn meniscus   • TONSILLECTOMY         Prior to Admission Medications      Prescriptions Last Dose Informant Patient Reported? Taking?    clonazePAM (KlonoPIN) 0.5 MG tablet   Yes Yes    Take 0.5 mg by mouth 2 (Two) Times a Day As Needed.    doxepin (SINEquan) 25 MG capsule   Yes Yes    Take 25 mg by mouth Every Night.    megestrol (MEGACE) 40 MG/ML suspension   Yes Yes    Take  by mouth Daily.    potassium chloride (MICRO-K) 10 MEQ CR capsule   Yes Yes    Take 10 mEq by mouth Daily.    acetaminophen (TYLENOL) 325 MG tablet   No No    Take 2 tablets by mouth Every 4 (Four) Hours As Needed for Mild Pain .    aspirin 81 MG EC tablet  Family Member Yes No    Take 81 mg by mouth Daily.    simvastatin (ZOCOR) 40 MG tablet  Family Member No No    Take 1 tablet by mouth Every Night.    vitamin B-12 (CYANOCOBALAMIN) 1000 MCG tablet   No No    Take 1 tablet by mouth Daily.    Vortioxetine HBr (TRINTELLIX) 10 MG tablet   No No    Take 10 mg by mouth Daily.          Sulfa antibiotics    Family History   Problem Relation Age of Onset   • Kidney disease Mother    • Nephrolithiasis Mother    • Diabetes Mother    • Heart disease Father    • Diabetes Other    • Breast cancer Sister    • Thyroid disease Brother        Social History     Socioeconomic History   • Marital status:      Spouse name: Not on file   • Number of children: Not on file   • Years of education: Not on file   • Highest education level: Not on file   Tobacco Use   • Smoking status: Never Smoker   • Smokeless tobacco: Never Used   Substance and Sexual Activity   • Alcohol use: No   • Drug use: No   • Sexual activity: Defer       Physical Exam    Temp:  [96.9 °F (36.1 °C)] 96.9 °F (36.1 °C)  Heart Rate:  [101-105] 102  Resp:  [17-20] 17  BP: ()/(64-97) 117/94    PHYSICAL EXAM   Constitutional:  Elderly cachectic white female pt in bed, No acute respiratory distress, + accessory muscle use  Head: - NCAT  Eyes: No pallor, Anicteric conjunctiva, EOMI.  ENMT:  Mallampati 2, no oral thrush.  Dry MM.   NECK: Trachea midline, No  thyromegaly, no palpable cervical LNpathy  Heart: RRR, no murmur. No pedal edema   Lungs: LUDIVINA +, tachypnea, no wheezes/ crackles heard    Abdomen: Soft.  Scaphoid no tenderness, guarding or rigidity. No palpable masses  Extremities: Extremities warm and well perfused. No cyanosis/ clubbing  Neuro: Conscious, responding to painful stimuli, no gross focal neuro deficits  Psych: Mood and affect uto    PPE recommended per [facility] infectious disease Isolation protocol for the current clinical scenario(as mentioned below) was followed.     LABS and IMAGING DATA:    Lab Results (last 24 hours)     Procedure Component Value Units Date/Time    Lactic Acid, Reflex [[id number]] Collected: 11/15/20 1712    Specimen: Blood Updated: 11/15/20 1715    POC Glucose Once [[id number]]  (Abnormal) Collected: 11/15/20 1703    Specimen: Blood Updated: 11/15/20 1704     Glucose 140 mg/dL     Lactic Acid, Reflex Timer (This will reflex a repeat order 3-3:15 hours after ordered.) [[id number]] Collected: 11/15/20 1325    Specimen: Blood Updated: 11/15/20 1700     Hold Tube Hold for add-ons.     Comment: Auto resulted.       Blood Culture - Blood, Arm, Left [[id number]] Collected: 11/15/20 1451    Specimen: Blood from Arm, Left Updated: 11/15/20 1517    Urine Drug Screen - Urine, Catheter [[id number]]  (Normal) Collected: 11/15/20 1347    Specimen: Urine, Catheter Updated: 11/15/20 1514     Amphet/Methamphet, Screen Negative     Barbiturates Screen, Urine Negative     Benzodiazepine Screen, Urine Negative     Cocaine Screen, Urine Negative     Opiate Screen Negative     THC, Screen, Urine Negative     Methadone Screen, Urine Negative     Oxycodone Screen, Urine Negative    Narrative:      Negative Thresholds For Drugs Screened:     Amphetamines               500 ng/ml   Barbiturates               200 ng/ml   Benzodiazepines            100 ng/ml   Cocaine                    300 ng/ml   Methadone                  300 ng/ml   Opiates                     300 ng/ml   Oxycodone                  100 ng/ml   THC                        50 ng/ml    The Normal Value for all drugs tested is negative. This report includes final unconfirmed screening results to be used for medical treatment purposes only. Unconfirmed results must not be used for non-medical purposes such as employment or legal testing. Clinical consideration should be applied to any drug of abuse test, particulary when unconfirmed results are used.    Respiratory Panel PCR w/COVID-19(SARS-CoV-2) VICENTA/AMADA/VERONIKA/PAD/COR/MAD/JOS In-House, NP Swab in UTM/VTM, 3-4 HR TAT - Swab, Nasopharynx [[id number]]  (Normal) Collected: 11/15/20 1315    Specimen: Swab from Nasopharynx Updated: 11/15/20 1458     ADENOVIRUS, PCR Not Detected     Coronavirus 229E Not Detected     Coronavirus HKU1 Not Detected     Coronavirus NL63 Not Detected     Coronavirus OC43 Not Detected     COVID19 Not Detected     Human Metapneumovirus Not Detected     Human Rhinovirus/Enterovirus Not Detected     Influenza A PCR Not Detected     Influenza B PCR Not Detected     Parainfluenza Virus 1 Not Detected     Parainfluenza Virus 2 Not Detected     Parainfluenza Virus 3 Not Detected     Parainfluenza Virus 4 Not Detected     RSV, PCR Not Detected     Bordetella pertussis pcr Not Detected     Bordetella parapertussis PCR Not Detected     Chlamydophila pneumoniae PCR Not Detected     Mycoplasma pneumo by PCR Not Detected    Narrative:      Fact sheet for providers: https://docs.[employer]/wp-content/uploads/ZHL2497-9201-JE0.1-EUA-Provider-Fact-Sheet-3.pdf    Fact sheet for patients: https://docs.[employer]/wp-content/uploads/LTH2302-7942-TR5.1-EUA-Patient-Fact-Sheet-1.pdf    Blood Culture - Blood, Hand, Left [[id number]] Collected: 11/15/20 1436    Specimen: Blood from Hand, Left Updated: 11/15/20 1442    Urinalysis, Microscopic Only - Urine, Catheter [[id number]]  (Abnormal) Collected: 11/15/20 1347    Specimen: Urine, Catheter Updated:  11/15/20 1419     RBC, UA       Unable to determine due to loaded field     /HPF     WBC, UA Too Numerous to Count /HPF      Bacteria, UA 4+ /HPF      Squamous Epithelial Cells, UA       Unable to determine due to loaded field     /HPF     Hyaline Casts, UA       Unable to determine due to loaded field     /LPF     Calcium Oxalate Crystals, UA Small/1+ /HPF      Methodology Manual Light Microscopy    [place] Draw [[id number]] Collected: 11/15/20 1311    Specimen: Blood Updated: 11/15/20 1415    Narrative:      The following orders were created for panel order [place] Draw.  Procedure                               Abnormality         Status                     ---------                               -----------         ------                     Light Blue Top[[id number]]                                   Final result               Green Top (Gel)[[id number]]                                  Final result               Lavender Top[[id number]]                                     Final result               Gold Top - SST[[id number]]                                   Final result                 Please view results for these tests on the individual orders.    Gold Top - SST [[id number]] Collected: 11/15/20 1311    Specimen: Blood Updated: 11/15/20 1415     Extra Tube Hold for add-ons.     Comment: Auto resulted.       Light Blue Top [[id number]] Collected: 11/15/20 1311    Specimen: Blood Updated: 11/15/20 1415     Extra Tube hold for add-on     Comment: Auto resulted       Green Top (Gel) [[id number]] Collected: 11/15/20 1311    Specimen: Blood Updated: 11/15/20 1415     Extra Tube Hold for add-ons.     Comment: Auto resulted.       Lavender Top [[id number]] Collected: 11/15/20 1311    Specimen: Blood Updated: 11/15/20 1415     Extra Tube hold for add-on     Comment: Auto resulted       Troponin [[id number]]  (Abnormal) Collected: 11/15/20 1311    Specimen: Blood Updated: 11/15/20 1413     Troponin T 0.080 ng/mL     Narrative:    "   Troponin T Reference Range:  <= 0.03 ng/mL-   Negative for AMI  >0.03 ng/mL-     Abnormal for myocardial necrosis.  Clinicians would have to utilize clinical acumen, EKG, Troponin and serial changes to determine if it is an Acute Myocardial Infarction or myocardial injury due to an underlying chronic condition.       Results may be falsely decreased if patient taking Biotin.      Comprehensive Metabolic Panel [[id number]]  (Abnormal) Collected: 11/15/20 1311    Specimen: Blood Updated: 11/15/20 1412     Glucose 192 mg/dL      BUN 81 mg/dL      Creatinine 2.50 mg/dL      Sodium 172 mmol/L      Potassium 4.4 mmol/L      Chloride 128 mmol/L      CO2 25.0 mmol/L      Calcium 9.2 mg/dL      Total Protein 6.5 g/dL      Albumin 3.10 g/dL      ALT (SGPT) 13 U/L      AST (SGOT) 11 U/L      Alkaline Phosphatase 63 U/L      Total Bilirubin 0.4 mg/dL      eGFR Non African Amer 18 mL/min/1.73      Globulin 3.4 gm/dL      A/G Ratio 0.9 g/dL      BUN/Creatinine Ratio 32.4     Anion Gap 19.0 mmol/L     Narrative:      GFR Normal >60  Chronic Kidney Disease <60  Kidney Failure <15      Procalcitonin [[id number]]  (Abnormal) Collected: 11/15/20 1311    Specimen: Blood Updated: 11/15/20 1412     Procalcitonin 0.34 ng/mL     Narrative:      As a Marker for Sepsis (Non-Neonates):   1. <0.5 ng/mL represents a low risk of severe sepsis and/or septic shock.  1. >2 ng/mL represents a high risk of severe sepsis and/or septic shock.    As a Marker for Lower Respiratory Tract Infections that require antibiotic therapy:  PCT on Admission     Antibiotic Therapy             6-12 Hrs later  > 0.5                Strongly Recommended            >0.25 - <0.5         Recommended  0.1 - 0.25           Discouraged                   Remeasure/reassess PCT  <0.1                 Strongly Discouraged          Remeasure/reassess PCT      As 28 day mortality risk marker: \"Change in Procalcitonin Result\" (> 80 % or <=80 %) if Day 0 (or Day 1) and Day 4 values " are available. Refer to http://www.[facility]-pct-calculator.com/   Change in PCT <=80 %   A decrease of PCT levels below or equal to 80 % defines a positive change in PCT test result representing a higher risk for 28-day all-cause mortality of patients diagnosed with severe sepsis or septic shock.  Change in PCT > 80 %   A decrease of PCT levels of more than 80 % defines a negative change in PCT result representing a lower risk for 28-day all-cause mortality of patients diagnosed with severe sepsis or septic shock.                Results may be falsely decreased if patient taking Biotin.     T4, Free [[id number]]  (Normal) Collected: 11/15/20 1311    Specimen: Blood Updated: 11/15/20 1412     Free T4 1.10 ng/dL     Narrative:      Results may be falsely increased if patient taking Biotin.      TSH [[id number]]  (Normal) Collected: 11/15/20 1311    Specimen: Blood Updated: 11/15/20 1412     TSH 1.690 uIU/mL     CBC & Differential [[id number]]  (Abnormal) Collected: 11/15/20 1311    Specimen: Blood Updated: 11/15/20 1411    Narrative:      The following orders were created for panel order CBC & Differential.  Procedure                               Abnormality         Status                     ---------                               -----------         ------                     CBC Auto Differential[[id number]]        Abnormal            Final result                 Please view results for these tests on the individual orders.    CBC Auto Differential [[id number]]  (Abnormal) Collected: 11/15/20 1311    Specimen: Blood Updated: 11/15/20 1411     WBC 33.98 10*3/mm3      RBC 5.43 10*6/mm3      Hemoglobin 14.5 g/dL      Hematocrit 44.1 %      MCV 81.2 fL      MCH 26.7 pg      MCHC 32.9 g/dL      RDW 14.4 %      RDW-SD 41.2 fl      MPV 10.1 fL      Platelets 337 10*3/mm3     Manual Differential [[id number]]  (Abnormal) Collected: 11/15/20 1311    Specimen: Blood Updated: 11/15/20 1411     Neutrophil % 93.0 %      Lymphocyte %  5.0 %      Monocyte % 2.0 %      Neutrophils Absolute 31.60 10*3/mm3      Lymphocytes Absolute 1.70 10*3/mm3      Monocytes Absolute 0.68 10*3/mm3      Shashi Cells Mod/2+     Ovalocytes Slight/1+     Poikilocytes Large/3+     Polychromasia Slight/1+     WBC Morphology Normal     Platelet Morphology Normal    Lipase [[id number]]  (Normal) Collected: 11/15/20 1311    Specimen: Blood Updated: 11/15/20 1403     Lipase 38 U/L     CK [[id number]]  (Normal) Collected: 11/15/20 1311    Specimen: Blood Updated: 11/15/20 1403     Creatine Kinase 23 U/L     Magnesium [[id number]]  (Abnormal) Collected: 11/15/20 1311    Specimen: Blood Updated: 11/15/20 1403     Magnesium 2.8 mg/dL     Urinalysis With Microscopic If Indicated (No Culture) - Urine, Catheter [[id number]]  (Abnormal) Collected: 11/15/20 1347    Specimen: Urine, Catheter Updated: 11/15/20 1401     Color, UA Dark Yellow     Appearance, UA Turbid     pH, UA 6.0     Specific Gravity, UA 1.018     Glucose, UA Negative     Ketones, UA Trace     Bilirubin, UA Negative     Blood, UA Large (3+)     Protein, UA >=300 mg/dL (3+)     Leuk Esterase, UA Large (3+)     Nitrite, UA Positive     Urobilinogen, UA 1.0 E.U./dL    Lactic Acid, Plasma [[id number]]  (Abnormal) Collected: 11/15/20 1325    Specimen: Blood Updated: 11/15/20 1358     Lactate 3.9 mmol/L     Blood Gas, Arterial - [[id number]]  (Abnormal) Collected: 11/15/20 1327    Specimen: Arterial Blood Updated: 11/15/20 1330     Site Arterial: left brachial     Hector's Test N/A     pH, Arterial 7.401 pH units      pCO2, Arterial 36.5 mm Hg      pO2, Arterial 91.0 mm Hg      HCO3, Arterial 22.6 mmol/L      Base Excess, Arterial -1.8 mmol/L      O2 Saturation Calculated 97.1 %      Barometric Pressure for Blood Gas 745.9 mmHg      Modality Room Air     Set Mech Resp Rate 20        Lab Results   Component Value Date    CKTOTAL 23 11/15/2020    TROPONINT 0.080 (C) 11/15/2020         Results from last 7 days   Lab Units  11/15/20  1325 11/15/20  1311   PROCALCITONIN ng/mL  --  0.34*   LACTATE mmol/L 3.9*  --          Results from last 7 days   Lab Units 11/15/20  1327   PH, ARTERIAL pH units 7.401   PO2 ART mm Hg 91.0   PCO2, ARTERIAL mm Hg 36.5   HCO3 ART mmol/L 22.6        I have personally reviewed the chest imaging from the last 3 days, agree with assessment of the radiologist and summarized it below    Assessment     ASSESSMENT:  Acute metabolic encephalopathy  Recent fall without any injury  Urinary tract infection   Sepsis with hypotension  Severe dehydration  Severe life-threatening hypernatremia  Acute renal failure  Mild elevated troponin  Hypertension  H/o anorexia  Severe PCM  Dementia  DNR    PLAN:  Patient with severe encephalopathy in the setting of urosepsis as well as severe life-threatening hypernatremia.  We will continue with aggressive volume resuscitation as she is significantly dehydrated and we will continue to trend sodium closely to avoid overcorrection.  We will continue with antibiotics and volume resuscitation for urinary infection and sepsis  We will consult nephrology given severe hyponatremia and acute renal failure.  Would expect improvement with volume resuscitation  Mild elevated troponin likely due to renal failure.  No obvious EKG concerns noted on initial check.  DNR    DVT prophylaxis    CC-40 minutes    Very guarded prognosis given extremely poor baseline with severe encephalopathy and severe life-threatening electrolyte abnormalities. D/w daughter and updated her and answered her questions.     I have discussed my findings and recommendations with patient and family.     Tutu Schmidt MD  11/15/2020  17:24 EST

## 2020-11-15 NOTE — ED TRIAGE NOTES
Pt came by ems from [facility]. Pt reported non responsive with bp in the 40s. bp reported 64 systolic en route. Received 500 cc per ems. This rn wearing mask, goggles, gown, face shield, hair net and gloves. Pt wearing mask during encounter.

## 2020-11-15 NOTE — ED NOTES
This rn wearing mask, goggles, gown, face shield, hair net and gloves. Pt wearing mask during encounter.        Jeanine Jeter, RUSLAN  11/15/20 [id number]

## 2020-11-15 NOTE — ED NOTES
Lavinia guanaco (daughter in law and son) called for update on pt. For new updates please reach her at [phone].      Jeanine Jeter, RUSLAN  11/15/20 [id number]

## 2020-11-15 NOTE — ED NOTES
Repositioned pt onto left side. Checked pts brief- dry at this time.      Yvonne Evans, RN  11/15/20 [id number]

## 2020-11-16 NOTE — CONSULTS
"  Adult Nutrition  Assessment/PES    Patient Name:  Ann Marie Blanton  YOB: 1933  MRN: [id number]  Admit Date:  11/15/2020    Assessment Date:  11/16/2020    Comments:  Nutrition consult for TF. Pt here for AMS, severe hypernatremia, Sepsis and has a hx of anorexia.  NG Cortrak placed.  Pt also meets criteria for severe malnutrition - See MSA. Would begin TF with Fibersource HN.  Goal is 60cc/hr.  Adv slow due to risk for refeeding syndrome.  Free water per renal. Will follow clinical course, nutrition support needs.    **  Pt is Covid-19 negative. RD used appropriate personal protective equipment including gown, eye protection, surgical mask and gloves during the entire procedure.  Pt wearing mask as well over mouth only. Hand hygiene was completed before and after tube placement.     Reason for Assessment     Row Name 11/16/20 1133          Reason for Assessment    Reason For Assessment  TF/PN;physician consult     Diagnosis  neurologic conditions;metabolic state;renal disease AMS-Acute metabolic encephalopathy, ARF, Severe Hypernatremia, Sepsis, dementia, H/o anorexia         Nutrition/Diet History     Row Name 11/16/20 1135          Nutrition/Diet History    Factors Affecting Nutritional Intake  impaired cognitive status/motor control;difficulty/impaired swallowing nonverbal         Anthropometrics     Row Name 11/16/20 1135          Anthropometrics    Height  162.6 cm (64\")        Admit Weight    Admit Weight  48 kg (105 lb 13.1 oz)        Ideal Body Weight (IBW)    Ideal Body Weight (IBW) (kg)  55        Usual Body Weight (UBW)    Weight Loss  unintentional     Weight Loss Time Frame  35lb loss in 1-3 months        Body Mass Index (BMI)    BMI Assessment  BMI 17-18.4: protein-energy malnutrition grade I BMI 18         Labs/Tests/Procedures/Meds     Row Name 11/16/20 1137          Labs/Procedures/Meds    Lab Results Reviewed  reviewed, pertinent     Lab Results Comments  Na+ 172, 162, K+2.7, Cl, " "BUN, Cr, Phos 2.2, Mg 2.6, wbc        Diagnostic Tests/Procedures    Diagnostic Test/Procedure Reviewed  reviewed, pertinent     Diagnostic Test/Procedures Comments  CT abd - dense stool in colon        Medications    Pertinent Medications Reviewed  reviewed, pertinent     Pertinent Medications Comments  rocephin, D5W@150         Physical Findings     Row Name 11/16/20 1139          Physical Findings    Overall Physical Appearance  underweight     Gastrointestinal  constipation     Oral/Mouth Cavity  poor dentition     Skin  pressure injury;other (see comments) PI to coccyxand heels, B=11         Estimated/Assessed Needs     Row Name 11/16/20 1141 11/16/20 1135       Calculation Measurements    Weight Used For Calculations  48 kg (105 lb 13.1 oz)  --    Height  --  162.6 cm (64\")       Estimated/Assessed Needs    Additional Documentation  KCAL/KG (Group);Fluid Requirements (Group);Protein Requirements (Group)  --       KCAL/KG    KCAL/KG  35 Kcal/Kg (kcal);40 Kcal/Kg (kcal)  --    35 Kcal/Kg (kcal)  1680  --    40 Kcal/Kg (kcal)  1920  --       Protein Requirements    Weight Used For Protein Calculations  48 kg (105 lb 13.1 oz)  --    Est Protein Requirement Amount (gms/kg)  2.0 gm protein 72-96 g pro (1.5-2.0/kg)  --    Estimated Protein Requirements (gms/day)  96  --       Fluid Requirements    Fluid Requirements (mL/day)  1800  --    RDA Method (mL)  1800  --        Nutrition Prescription Ordered     Row Name 11/16/20 1142          Nutrition Prescription PO    Current PO Diet  NPO        Nutrition Prescription EN    Product  -- per RD         Evaluation of Received Nutrient/Fluid Intake     Row Name 11/16/20 1142          Fluid Intake Evaluation    IV Fluid (mL)  3600        EN Evaluation    TF Tolerance  -- No BM yet           Malnutrition Severity Assessment     Row Name 11/16/20 1222          Malnutrition Severity Assessment    Malnutrition Type  Acute Disease or Injury - Related Malnutrition        " Unintentional Weight Loss     Unintentional Weight Loss Findings  Severe     Unintentional Weight Loss   Weight loss greater than 7.5% in three months        Muscle Loss    Loss of Muscle Mass Findings  Severe     [place]  Severe - deep hollowing/scooping, lack of muscle to touch, facial bones well defined     Clavicle Bone Region  Severe - protruding prominent bone     Dorsal Hand Region  Severe - prominent depression        Fat Loss    Subcutaneous Fat Loss Findings  Severe     Orbital Region   Severe - pronounced hollowness/depression, dark circles, loose saggy skin        Criteria Met (Must meet criteria for severity in at least 2 of these categories: M Wasting, Fat Loss, Fluid, Secondary Signs, Wt. Status, Intake)    Patient meets criteria for   Severe Malnutrition           Problem/Interventions:  Problem 1     Row Name 11/16/20 1225          Nutrition Diagnoses Problem 1    Problem 1  Malnutrition     Etiology (related to)  Medical Diagnosis     Neurological  AMS;Other (comment);Dementia Hx Anorexia     Signs/Symptoms (evidenced by)  Unintended Weight Change;Other (comment) see MSA     Unintended Weight Change  Loss     Number of Pounds Lost  35lb     Weight loss time period  1-3 months           Intervention Goal     Row Name 11/16/20 1229          Intervention Goal    General  Maintain nutrition;Nutrition support treatment;Reduce/improve symptoms;Meet nutritional needs for age/condition;Improved nutrition related lab(s);Disease management/therapy     TF/PN  Inititiate TF/PN;Tolerate TF at goal     Weight  Appropriate weight gain         Nutrition Intervention     Row Name 11/16/20 1229          Nutrition Intervention    RD/Tech Action  Care plan reviewd;Follow Tx progress;Recommend/ordered     Recommended/Ordered  EN         Nutrition Prescription     Row Name 11/16/20 1229          Nutrition Prescription EN    Enteral Prescription  Enteral begin/change;Enteral to supply     Enteral Route  ND      Product  Fibersource HN     TF Delivery Method  Continuous     Continuous TF Goal Rate (mL/hr)  60 mL/hr     Water flush (mL)   30 mL     Water Flush Frequency  Every 4 hours renal managing     New EN Prescription Ordered?  Yes        EN to Supply    Kcal/Day  1728 Kcal/Day     Protein (gm/day)  77.76 gm/day     Meet Estimated Kcal Need (%)  100 %     Meet Estimated Protein Need (%)  100 %     TF Free H2O (mL)  1166.4 mL     Total Free H2O (mL/day)  1346 mL/day         Education/Evaluation     Row Name 11/16/20 1232          Education    Education  Will Instruct as appropriate        Monitor/Evaluation    Monitor  Skin status;Per protocol;I&O;Symptoms;Pertinent labs;TF delivery/tolerance;Weight           Electronically signed by:  Shayy Macias RD  11/16/20 15:33 EST

## 2020-11-16 NOTE — NURSING NOTE
WOCN consult: pressure injuries upon admission. DTIs     11/16/20 1100   Wound coccyx Pressure Injury   No Placement Date or Time found.   Present on Hospital Admission: Yes  Location: coccyx  Primary Wound Type: Pressure Injury  Stage, Pressure Injury : Stage 1   Dressing Appearance dry;intact   Base maroon/purple   Periwound intact;blanchable   Edges irregular   Wound Length (cm)   (large area @ 5ucj6nb DTI)   Drainage Amount none   Care, Wound   (silicone border dressing)   Wound Left heel   No Placement Date or Time found.   Side: Left  Location: heel   Dressing Appearance dry;intact   Base maroon/purple   Periwound intact;blanchable   Wound Length (cm) 3 cm   Wound Width (cm) 3 cm   Drainage Amount none   Dressing Care   (silicone border dressing)   Wound 11/15/20 2030 Right posterior heel Pressure Injury   Placement Date/Time: 11/15/20 2030   Present on Hospital Admission: Yes  Side: Right  Orientation: posterior  Location: heel  Primary Wound Type: Pressure Injury  Stage, Pressure Injury : deep tissue injury   Dressing Appearance dry;intact   Base maroon/purple   Periwound intact;blanchable   Wound Length (cm) 3 cm   Wound Width (cm) 3 cm   Drainage Amount none   Care, Wound   (silicone marge dressing)     Bilateral heels and coccyx. Continue silicone border dressings and offload heels

## 2020-11-16 NOTE — PROGRESS NOTES
Discharge Planning Assessment  [facility]     Patient Name: Ann Marie Blanton  MRN: [id number]  Today's Date: 11/16/2020    Admit Date: 11/15/2020    Discharge Needs Assessment     Row Name 11/16/20 1227       Living Environment    Lives With  facility resident    Current Living Arrangements  extended care facility    Potentially Unsafe Housing Conditions  unable to assess    Primary Care Provided by  other (see comments)    Provides Primary Care For  other (see comments)    Family Caregiver if Needed  none    Quality of Family Relationships  supportive    Able to Return to Prior Arrangements  yes       Resource/Environmental Concerns    Resource/Environmental Concerns  none    Transportation Concerns  car, none       Transition Planning    Patient/Family Anticipates Transition to  home with family    Patient/Family Anticipated Services at Transition  none    Transportation Anticipated  family or friend will provide       Discharge Needs Assessment    Current Outpatient/Agency/Support Group  assisted living facility    Concerns to be Addressed  discharge planning    Equipment Needed After Discharge  none    Outpatient/Agency/Support Group Needs  assisted living facility    Provided Post Acute Provider List?  N/A    Provided Post Acute Provider Quality & Resource List?  N/A        Discharge Plan     Row Name 11/16/20 1237       Plan    Plan  Return to [facility]    Plan Comments  IMM noted  CCP spoke to patient’s daughter/MARIAH Sue, [phone]  via telephone  CCP role explained and discharge planning discussed. Face sheet verified. Patient’s PCP is Dr. Josh Gross.  Pt. emergency contact is her daughter.  Pt obtains her medications from [facility] where she lives in the memory Care/Personal Care unit.  She uses a wheel chair to ambulate. She is dependent with ADL’s. Patient has no past history of Home Health.  Patient has no prior rehab stays.   Patient plan is to return to the Memory Care at [facility]  [place].    CCP following for discharge needs as the patient's clinical course evolves        Continued Care and Services - Admitted Since 11/15/2020     Destination     Service Provider Request Status Selected Services Address Phone Fax    [facility]  Pending - Request Sent N/A [id number] [facility], [facility] [address] [phone]                Demographic Summary    No documentation.       Functional Status    No documentation.       Psychosocial    No documentation.       Abuse/Neglect    No documentation.       Legal    No documentation.       Substance Abuse    No documentation.       Patient Forms    No documentation.           Bita Wade RN

## 2020-11-16 NOTE — DISCHARGE PLACEMENT REQUEST
"Evelio Blanchard (87 y.o. Female)     Date of Birth Social Security Number Address Home Phone MRN    02/23/1933  [address] [address] [phone] [id number]    [employer] Marital Status          None        Admission Date Admission Type Admitting Provider Attending Provider Department, Room/Bed    11/15/20 Emergency Tutu Schmidt MD Nidadavolu, Vinay Gopal, MD [facility] INTENSIVE CARE, I376/1    Discharge Date Discharge Disposition Discharge Destination                       Attending Provider: Tutu Schmidt MD    Allergies: Sulfa Antibiotics    Isolation: None   Infection: None   Code Status: No CPR    Ht: 162.6 cm (64\")   Wt: 48 kg (105 lb 13.1 oz)    Admission Cmt: None   Principal Problem: None                Active Insurance as of 11/15/2020     Primary Coverage     Payor Plan Insurance Group Employer/Plan Group    MEDICARE MEDICARE A & B      Payor Plan Address Payor Plan Phone Number Payor Plan Fax Number Effective Dates    [address] [phone]  2/1/1998 - None Entered    [facility] [id number]       Subscriber Name Subscriber Birth Date Member ID       EVELIO BLANCHARD 2/23/1933 [id number]           Secondary Coverage     Payor Plan Insurance Group Employer/Plan Group    [facility] SUPP [id number]     Payor Plan Address Payor Plan Phone Number Payor Plan Fax Number Effective Dates    [address]   12/1/2016 - None Entered    [facility] [id number]       Subscriber Name Subscriber Birth Date Member ID       EVELIO BLANCHARD 2/23/1933 [id number]                 Emergency Contacts      (Rel.) Home Phone Work Phone Mobile Phone    Linette Gamboa (Poa) (Daughter) [phone] -- --    David Blanchard (Poa) (Son) -- -- [phone]              "

## 2020-11-16 NOTE — NURSING NOTE
Spoke with pts daughter this am and was told that pt has fallen three times in the past few days. Also pt had been refusing to eat and drink r/t pts h/o off anorexia. Pt moans out for no reason and if touched. Pt is oriented to self. Call light in reach.

## 2020-11-16 NOTE — PROGRESS NOTES
Tutu Schmidt MD                          [phone]      Patient ID:    Name:  Ann Marie Blanton    MRN:  [id number]    1933   87 y.o.  female            Patient Care Team:  Josh Gross MD as PCP - General (Family Medicine)    CC/ Reason for visit: Acute metabolic encephalopathy, severe hypernatremia, dehydration, acute renal failure    Subjective: Pt seen and examined this AM.  Patient continues to be encephalopathic and is being aggressively volume resuscitated with some response.  Labs not available this morning due to hemolysis and recommended nurse to get repeat testing stat.  Will reconsult nephrology    ROS: Unable to obtain due to severe encephalopathy    Objective     Vital Signs past 24hrs    BP range: BP: ()/() 85/58  Pulse range: Heart Rate:  [] 97  Resp rate range: Resp:  [17-20] 18  Temp range: Temp (24hrs), Av °F (36.7 °C), Min:96.9 °F (36.1 °C), Max:98.3 °F (36.8 °C)      Ventilator/Non-Invasive Ventilation Settings (From admission, onward)    None          Device (Oxygen Therapy): room air       48 kg (105 lb 13.1 oz); Body mass index is 18.16 kg/m².      Intake/Output Summary (Last 24 hours) at 2020 0900  Last data filed at 2020 0514  Gross per 24 hour   Intake 4009 ml   Output 300 ml   Net 3709 ml       PHYSICAL EXAM   Constitutional:  Elderly cachectic white female pt in bed, No acute respiratory distress, + accessory muscle use  Head: - NCAT  Eyes: No pallor, Anicteric conjunctiva, EOMI.  ENMT:  Mallampati 2, no oral thrush.  Dry MM.   NECK: Trachea midline, No thyromegaly, no palpable cervical LNpathy  Heart: RRR, no murmur. No pedal edema   Lungs: LUDIVINA +, tachypnea, no wheezes/ crackles heard    Abdomen: Soft.  Scaphoid no tenderness, guarding or rigidity. No palpable masses  Extremities: Extremities warm and well perfused. No cyanosis/ clubbing  Neuro: Conscious, responding to painful stimuli, no gross focal  neuro deficits  Psych: Mood and affect uto    PPE recommended per [facility] infectious disease Isolation protocol for the current clinical scenario(as mentioned below) was followed.     Scheduled meds:  cefTRIAXone, 1 g, Intravenous, Q24H  sodium chloride, 10 mL, Intravenous, Q12H        IV meds:                      sodium chloride, 100 mL/hr, Last Rate: 100 mL/hr (11/16/20 0018)        Data Review:      Results from last 7 days   Lab Units 11/16/20  0457 11/15/20  1311   SODIUM mmol/L  --  172*   POTASSIUM mmol/L  --  4.4   CHLORIDE mmol/L  --  128*   CO2 mmol/L  --  25.0   BUN mg/dL  --  81*   CREATININE mg/dL  --  2.50*   CALCIUM mg/dL  --  9.2   BILIRUBIN mg/dL  --  0.4   ALK PHOS U/L  --  63   ALT (SGPT) U/L  --  13   AST (SGOT) U/L  --  11   GLUCOSE mg/dL  --  192*   WBC 10*3/mm3 27.76* 33.98*   HEMOGLOBIN g/dL 12.9 14.5   PLATELETS 10*3/mm3 213 337   INR  1.52*  --    PROCALCITONIN ng/mL  --  0.34*       Lab Results   Component Value Date    CALCIUM 9.2 11/15/2020    PHOS 2.2 (L) 11/16/2020             Results from last 7 days   Lab Units 11/15/20  1327   PH, ARTERIAL pH units 7.401   PO2 ART mm Hg 91.0   PCO2, ARTERIAL mm Hg 36.5   HCO3 ART mmol/L 22.6        Results Review:    I have reviewed the relevant laboratory results and independently reviewed the chest imaging from this hospitalization including the available echocardiogram reports personally and summarized it if/ when appropriate below    Assessment    Acute metabolic encephalopathy  Recent fall without any injury  Urinary tract infection   Sepsis with hypotension  Severe dehydration  Severe life-threatening hypernatremia  Acute renal failure  Mild elevated troponin  Hypertension  H/o anorexia  Severe PCM  Dementia  DNR    PLAN:  Patient remains severely encephalopathic with some improvement.  Continues to be severely dehydrated still and will continue with aggressive volume resuscitation.  We will give 1 L IV fluid bolus again.  Repeat  sodium not done since yesterday due to hemolysis in the lab of the sample.  Discussed with nurse to get repeat check stat given need for close monitoring of correction of sodium.   Oliguric still.  Nephrology has not evaluated the patient still and will reconsult for assistance.  Continue to trend troponin.  No concern for ischemia at this point based on EKG.  Will place a Dobbhoff tube and start tube feeds.  Discussed with the daughter yesterday and there is significant concern for poor p.o. intake and anorexia previously which has been an issue.  Nutrition evaluation for severe malnutrition  DNR    Guarded prognosis given overall poor baseline health with multiple new issues.  Discussed with nurse and daughter yesterday and expressed concern.    CC-40 mins    Tutu Schmidt MD  11/16/2020

## 2020-11-16 NOTE — PROGRESS NOTES
Malnutrition Severity Assessment    Patient Name:  Ann Marie Blanton  YOB: 1933  MRN: [id number]  Admit Date:  11/15/2020    Patient meets criteria for : Severe Malnutrition    Comments:  See nutrition assessment for details.  TF to begin.    Malnutrition Severity Assessment  Malnutrition Type: Acute Disease or Injury - Related Malnutrition     Malnutrition Type (last 8 hours)      Malnutrition Severity Assessment     Row Name 11/16/20 1222       Malnutrition Severity Assessment    Malnutrition Type  Acute Disease or Injury - Related Malnutrition    Row Name 11/16/20 1222       Unintentional Weight Loss     Unintentional Weight Loss Findings  Severe    Unintentional Weight Loss   Weight loss greater than 7.5% in three months    Row Name 11/16/20 1222       Muscle Loss    Loss of Muscle Mass Findings  Severe    [place]  Severe - deep hollowing/scooping, lack of muscle to touch, facial bones well defined    Clavicle Bone Region  Severe - protruding prominent bone    Dorsal Hand Region  Severe - prominent depression    Row Name 11/16/20 1222       Fat Loss    Subcutaneous Fat Loss Findings  Severe    Orbital Region   Severe - pronounced hollowness/depression, dark circles, loose saggy skin    Row Name 11/16/20 1222       Criteria Met (Must meet criteria for severity in at least 2 of these categories: M Wasting, Fat Loss, Fluid, Secondary Signs, Wt. Status, Intake)    Patient meets criteria for   Severe Malnutrition          Electronically signed by:  Shayy Macias RD  11/16/20 15:37 EST

## 2020-11-16 NOTE — PLAN OF CARE
Goal Outcome Evaluation:   Pt remains in ICU, has had low BP throughout the shift. 2L bolus given with good results. Fluids changed to D5W per nephrology. Cortrak placed and tubed feed started this afternoon. Spoke with pts family, aware of pt status.

## 2020-11-16 NOTE — CONSULTS
Referring Provider: Tutu Schmidt MD  Reason for Consultation: Evaluation of patient severe hypernatremia    Subjective     Chief complaint   Chief Complaint   Patient presents with   • Altered Mental Status       History of present illness:   Is an 87-year-old  female was admitted because of altered mental status she has severe dementia noted to have severe hyponatremia sodium was 172 on admission she was hypotensive she was given multiple boluses of normal saline recheck sodium is 162 now.  Blood pressure has a stabilized.  Patient is unable to give any information.  Past medical history known for dementia, anxiety and depression, dyslipidemia, seasonal allergy and sciatic.    Past Medical History:   Diagnosis Date   • Anxiety    • Dementia (CMS/HCC)    • Depression    • Eustachian tube dysfunction    • Eye exam normal 2013   • Grief    • Hyperlipidemia    • Hypokalemia    • Mild cognitive impairment    • Sciatica    • Seasonal allergies      Past Surgical History:   Procedure Laterality Date   • APPENDECTOMY     • EYE SURGERY Left 06/01/2015    Dr. Saldana   • HYSTERECTOMY     • KNEE SURGERY  2010    torn meniscus   • TONSILLECTOMY       Family History   Problem Relation Age of Onset   • Kidney disease Mother    • Nephrolithiasis Mother    • Diabetes Mother    • Heart disease Father    • Diabetes Other    • Breast cancer Sister    • Thyroid disease Brother        Social History     Tobacco Use   • Smoking status: Never Smoker   • Smokeless tobacco: Never Used   Substance Use Topics   • Alcohol use: No   • Drug use: No     Medications Prior to Admission   Medication Sig Dispense Refill Last Dose   • clonazePAM (KlonoPIN) 0.5 MG tablet Take 0.5 mg by mouth 2 (Two) Times a Day As Needed.      • doxepin (SINEquan) 25 MG capsule Take 25 mg by mouth Every Night.      • megestrol (MEGACE) 40 MG/ML suspension Take  by mouth Daily.      • potassium chloride (MICRO-K) 10 MEQ CR capsule Take 10 mEq by  "mouth Daily.      • acetaminophen (TYLENOL) 325 MG tablet Take 2 tablets by mouth Every 4 (Four) Hours As Needed for Mild Pain .      • aspirin 81 MG EC tablet Take 81 mg by mouth Daily.      • simvastatin (ZOCOR) 40 MG tablet Take 1 tablet by mouth Every Night. 30 tablet 5    • vitamin B-12 (CYANOCOBALAMIN) 1000 MCG tablet Take 1 tablet by mouth Daily. 90 tablet 0    • Vortioxetine HBr (TRINTELLIX) 10 MG tablet Take 10 mg by mouth Daily. 30 tablet 11      Allergies:  Sulfa antibiotics    Review of Systems  Not obtainable    Objective     Vital Signs  Temp:  [96.9 °F (36.1 °C)-98.3 °F (36.8 °C)] 98.2 °F (36.8 °C)  Heart Rate:  [] 97  Resp:  [17-20] 18  BP: ()/() 85/58    Flowsheet Rows      First Filed Value   Admission Height  162.6 cm (64\") Documented at 11/15/2020 1317   Admission Weight  49.9 kg (110 lb) Documented at 11/15/2020 1317           No intake/output data recorded.  I/O last 3 completed shifts:  In: 4009 [I.V.:1959; IV Piggyback:2050]  Out: 300 [Urine:300]    Intake/Output Summary (Last 24 hours) at 11/16/2020 1132  Last data filed at 11/16/2020 0514  Gross per 24 hour   Intake 4009 ml   Output 300 ml   Net 3709 ml       Physical Exam:  General Appearance: Unresponsive, chronically ill, no acute distress,   Skin: warm and dry  HEENT: pupils round and reactive to light, oral mucosa very dry, nonicteric sclera  Neck: supple, no JVD, trachea midline, no cervical or supraclavicular lymphadenopathy, no carotid bruit  Lungs: Lateral, unlabored breathing effort  Heart: RRR, normal S1 and S2, no S3, no rub  Abdomen: soft, no guarding, normoactive bowel sounds auscultation  : no palpable bladder,  Extremities: no edema, cyanosis or clubbing  Neuro: Unable to assess      Results Review:  Results for orders placed or performed during the hospital encounter of 11/15/20   Respiratory Panel PCR w/COVID-19(SARS-CoV-2) VICENTA/AMADA/VERONIKA/PAD/COR/MAD/JOS In-House, NP Swab in UTM/VTM, 3-4 HR TAT - Swab, " Nasopharynx    Specimen: Nasopharynx; Swab   Result Value Ref Range    ADENOVIRUS, PCR Not Detected Not Detected    Coronavirus 229E Not Detected Not Detected    Coronavirus HKU1 Not Detected Not Detected    Coronavirus NL63 Not Detected Not Detected    Coronavirus OC43 Not Detected Not Detected    COVID19 Not Detected Not Detected - Ref. Range    Human Metapneumovirus Not Detected Not Detected    Human Rhinovirus/Enterovirus Not Detected Not Detected    Influenza A PCR Not Detected Not Detected    Influenza B PCR Not Detected Not Detected    Parainfluenza Virus 1 Not Detected Not Detected    Parainfluenza Virus 2 Not Detected Not Detected    Parainfluenza Virus 3 Not Detected Not Detected    Parainfluenza Virus 4 Not Detected Not Detected    RSV, PCR Not Detected Not Detected    Bordetella pertussis pcr Not Detected Not Detected    Bordetella parapertussis PCR Not Detected Not Detected    Chlamydophila pneumoniae PCR Not Detected Not Detected    Mycoplasma pneumo by PCR Not Detected Not Detected   Comprehensive Metabolic Panel    Specimen: Blood   Result Value Ref Range    Glucose 192 (H) 65 - 99 mg/dL    BUN 81 (H) 8 - 23 mg/dL    Creatinine 2.50 (H) 0.57 - 1.00 mg/dL    Sodium 172 (C) 136 - 145 mmol/L    Potassium 4.4 3.5 - 5.2 mmol/L    Chloride 128 (H) 98 - 107 mmol/L    CO2 25.0 22.0 - 29.0 mmol/L    Calcium 9.2 8.6 - 10.5 mg/dL    Total Protein 6.5 6.0 - 8.5 g/dL    Albumin 3.10 (L) 3.50 - 5.20 g/dL    ALT (SGPT) 13 1 - 33 U/L    AST (SGOT) 11 1 - 32 U/L    Alkaline Phosphatase 63 39 - 117 U/L    Total Bilirubin 0.4 0.0 - 1.2 mg/dL    eGFR Non African Amer 18 (L) >60 mL/min/1.73    Globulin 3.4 gm/dL    A/G Ratio 0.9 g/dL    BUN/Creatinine Ratio 32.4 (H) 7.0 - 25.0    Anion Gap 19.0 (H) 5.0 - 15.0 mmol/L   Lipase    Specimen: Blood   Result Value Ref Range    Lipase 38 13 - 60 U/L   Urinalysis With Microscopic If Indicated (No Culture) - Urine, Catheter    Specimen: Urine, Catheter   Result Value Ref Range     Color, UA Dark Yellow (A) Yellow, Straw    Appearance, UA Turbid (A) Clear    pH, UA 6.0 5.0 - 8.0    Specific Gravity, UA 1.018 1.005 - 1.030    Glucose, UA Negative Negative    Ketones, UA Trace (A) Negative    Bilirubin, UA Negative Negative    Blood, UA Large (3+) (A) Negative    Protein, UA >=300 mg/dL (3+) (A) Negative    Leuk Esterase, UA Large (3+) (A) Negative    Nitrite, UA Positive (A) Negative    Urobilinogen, UA 1.0 E.U./dL 0.2 - 1.0 E.U./dL   Troponin    Specimen: Blood   Result Value Ref Range    Troponin T 0.080 (C) 0.000 - 0.030 ng/mL   Lactic Acid, Plasma    Specimen: Blood   Result Value Ref Range    Lactate 3.9 (C) 0.5 - 2.0 mmol/L   Procalcitonin    Specimen: Blood   Result Value Ref Range    Procalcitonin 0.34 (H) 0.00 - 0.25 ng/mL   T4, Free    Specimen: Blood   Result Value Ref Range    Free T4 1.10 0.93 - 1.70 ng/dL   TSH    Specimen: Blood   Result Value Ref Range    TSH 1.690 0.270 - 4.200 uIU/mL   CK    Specimen: Blood   Result Value Ref Range    Creatine Kinase 23 20 - 180 U/L   Magnesium    Specimen: Blood   Result Value Ref Range    Magnesium 2.8 (H) 1.6 - 2.4 mg/dL   Blood Gas, Arterial -    Specimen: Arterial Blood   Result Value Ref Range    Site Arterial: left brachial     Hector's Test N/A     pH, Arterial 7.401 7.350 - 7.450 pH units    pCO2, Arterial 36.5 35.0 - 45.0 mm Hg    pO2, Arterial 91.0 80.0 - 100.0 mm Hg    HCO3, Arterial 22.6 22.0 - 28.0 mmol/L    Base Excess, Arterial -1.8 (L) 0.0 - 2.0 mmol/L    O2 Saturation Calculated 97.1 92.0 - 99.0 %    Barometric Pressure for Blood Gas 745.9 mmHg    Modality Room Air     Set Mech Resp Rate 20    CBC Auto Differential    Specimen: Blood   Result Value Ref Range    WBC 33.98 (C) 3.40 - 10.80 10*3/mm3    RBC 5.43 (H) 3.77 - 5.28 10*6/mm3    Hemoglobin 14.5 12.0 - 15.9 g/dL    Hematocrit 44.1 34.0 - 46.6 %    MCV 81.2 79.0 - 97.0 fL    MCH 26.7 26.6 - 33.0 pg    MCHC 32.9 31.5 - 35.7 g/dL    RDW 14.4 12.3 - 15.4 %    RDW-SD 41.2  37.0 - 54.0 fl    MPV 10.1 6.0 - 12.0 fL    Platelets 337 140 - 450 10*3/mm3   Manual Differential    Specimen: Blood   Result Value Ref Range    Neutrophil % 93.0 (H) 42.7 - 76.0 %    Lymphocyte % 5.0 (L) 19.6 - 45.3 %    Monocyte % 2.0 (L) 5.0 - 12.0 %    Neutrophils Absolute 31.60 (H) 1.70 - 7.00 10*3/mm3    Lymphocytes Absolute 1.70 0.70 - 3.10 10*3/mm3    Monocytes Absolute 0.68 0.10 - 0.90 10*3/mm3    [place] Cells Mod/2+ None Seen    Ovalocytes Slight/1+ None Seen    Poikilocytes Large/3+ None Seen    Polychromasia Slight/1+ None Seen    WBC Morphology Normal Normal    Platelet Morphology Normal Normal   Urinalysis, Microscopic Only - Urine, Catheter    Specimen: Urine, Catheter   Result Value Ref Range    RBC, UA Unable to determine due to loaded field (A) None Seen, 0-2 /HPF    WBC, UA Too Numerous to Count (A) None Seen, 0-2 /HPF    Bacteria, UA 4+ (A) None Seen /HPF    Squamous Epithelial Cells, UA Unable to determine due to loaded field (A) None Seen, 0-2 /HPF    Hyaline Casts, UA Unable to determine due to loaded field None Seen /LPF    Calcium Oxalate Crystals, UA Small/1+ None Seen /HPF    Methodology Manual Light Microscopy    Lactic Acid, Reflex Timer (This will reflex a repeat order 3-3:15 hours after ordered.)    Specimen: Blood   Result Value Ref Range    Hold Tube Hold for add-ons.    Urine Drug Screen - Urine, Catheter    Specimen: Urine, Catheter   Result Value Ref Range    Amphet/Methamphet, Screen Negative Negative    Barbiturates Screen, Urine Negative Negative    Benzodiazepine Screen, Urine Negative Negative    Cocaine Screen, Urine Negative Negative    Opiate Screen Negative Negative    THC, Screen, Urine Negative Negative    Methadone Screen, Urine Negative Negative    Oxycodone Screen, Urine Negative Negative   Lactic Acid, Reflex    Specimen: Blood   Result Value Ref Range    Lactate 2.0 0.5 - 2.0 mmol/L   Basic Metabolic Panel    Specimen: Blood   Result Value Ref Range    Glucose 88 65  - 99 mg/dL    BUN 54 (H) 8 - 23 mg/dL    Creatinine 1.03 (H) 0.57 - 1.00 mg/dL    Sodium 162 (C) 136 - 145 mmol/L    Potassium 2.7 (L) 3.5 - 5.2 mmol/L    Chloride 129 (H) 98 - 107 mmol/L    CO2 22.9 22.0 - 29.0 mmol/L    Calcium 7.6 (L) 8.6 - 10.5 mg/dL    eGFR Non African Amer 51 (L) >60 mL/min/1.73    BUN/Creatinine Ratio 52.4 (H) 7.0 - 25.0    Anion Gap 10.1 5.0 - 15.0 mmol/L   Magnesium    Specimen: Blood   Result Value Ref Range    Magnesium 2.6 (H) 1.6 - 2.4 mg/dL   Phosphorus    Specimen: Blood   Result Value Ref Range    Phosphorus 2.2 (L) 2.5 - 4.5 mg/dL   Protime-INR    Specimen: Blood   Result Value Ref Range    Protime 18.0 (H) 11.7 - 14.2 Seconds    INR 1.52 (H) 0.90 - 1.10   CBC Auto Differential    Specimen: Blood   Result Value Ref Range    WBC 27.76 (H) 3.40 - 10.80 10*3/mm3    RBC 4.89 3.77 - 5.28 10*6/mm3    Hemoglobin 12.9 12.0 - 15.9 g/dL    Hematocrit 38.5 34.0 - 46.6 %    MCV 78.7 (L) 79.0 - 97.0 fL    MCH 26.4 (L) 26.6 - 33.0 pg    MCHC 33.5 31.5 - 35.7 g/dL    RDW 14.3 12.3 - 15.4 %    RDW-SD 39.2 37.0 - 54.0 fl    MPV 10.7 6.0 - 12.0 fL    Platelets 213 140 - 450 10*3/mm3    Neutrophil % 90.3 (H) 42.7 - 76.0 %    Lymphocyte % 3.4 (L) 19.6 - 45.3 %    Monocyte % 4.4 (L) 5.0 - 12.0 %    Eosinophil % 0.0 (L) 0.3 - 6.2 %    Basophil % 0.4 0.0 - 1.5 %    Immature Grans % 1.5 (H) 0.0 - 0.5 %    Neutrophils, Absolute 25.04 (H) 1.70 - 7.00 10*3/mm3    Lymphocytes, Absolute 0.95 0.70 - 3.10 10*3/mm3    Monocytes, Absolute 1.23 (H) 0.10 - 0.90 10*3/mm3    Eosinophils, Absolute 0.01 0.00 - 0.40 10*3/mm3    Basophils, Absolute 0.12 0.00 - 0.20 10*3/mm3    Immature Grans, Absolute 0.41 (H) 0.00 - 0.05 10*3/mm3    nRBC 0.1 0.0 - 0.2 /100 WBC   POC Glucose Once    Specimen: Blood   Result Value Ref Range    Glucose 140 (H) 70 - 130 mg/dL   POC Glucose Once    Specimen: Blood   Result Value Ref Range    Glucose 116 70 - 130 mg/dL   POC Glucose Once    Specimen: Blood   Result Value Ref Range    Glucose  117 70 - 130 mg/dL   POC Glucose Once    Specimen: Blood   Result Value Ref Range    Glucose 73 70 - 130 mg/dL   ECG 12 Lead   Result Value Ref Range    QT Interval 389 ms   Type & Screen    Specimen: Blood   Result Value Ref Range    ABO Type B     RH type Negative     Antibody Screen Negative     T&S Expiration Date 11/18/2020 11:59:59 PM    Light Blue Top   Result Value Ref Range    Extra Tube hold for add-on    Green Top (Gel)   Result Value Ref Range    Extra Tube Hold for add-ons.    Lavender Top   Result Value Ref Range    Extra Tube hold for add-on    Gold Top - SST   Result Value Ref Range    Extra Tube Hold for add-ons.      Imaging Results (Last 72 Hours)     Procedure Component Value Units Date/Time    CT Head Without Contrast [[id number]] Collected: 11/15/20 1526     Updated: 11/15/20 1630    Narrative:      CT SCAN OF THE BRAIN WITHOUT CONTRAST     HISTORY: Hypotension. Fell.     The CT scan was performed as an emergency procedure through the brain  without contrast. There is prominent diffuse atrophy and chronic small  vessel ischemic change similar to yesterday's exam. There is no evidence  of acute intracranial hemorrhage or mass effect and the visualized  sinuses and mastoid air cells are clear.     Radiation dose reduction techniques were utilized, including automated  exposure control and exposure modulation based on body size.     This report was finalized on 11/15/2020 4:27 PM by Dr. Luis Felipe Lemus M.D.       CT Abdomen Pelvis Without Contrast [[id number]] Collected: 11/15/20 1604     Updated: 11/15/20 1630    Narrative:      CT SCANS OF THE CHEST AND ABDOMEN AND PELVIS WITHOUT CONTRAST     HISTORY: Recent falls. Vaguely nodular densities in the left chest noted  on recent portable chest x-ray. Chest and abdominal pain.     The CT scans of the chest and abdomen and pelvis were performed as an  emergency procedure without contrast and compared to previous CT scans  of the abdomen and pelvis dated  04/23/2018. The following findings are  present:  1. In the chest, there are several very small areas of somewhat  peripheral ground glass opacity in the left lung. These are nonspecific  but could relate to COVID-19 pneumonia and further clinical correlation  is recommended. One of the areas has a more nodular component measuring  up to 6 or 7 mm as seen on image 28 but likely corresponds to the same  process.  2. There is no mediastinal or hilar or axillary adenopathy. There is a  tiny pericardial effusion.  3. There are several hepatic cysts measuring up to 3.1 cm unchanged from  04/23/2018. The spleen, pancreas, both adrenal glands, and both kidneys  are unremarkable and unchanged.  4. There is no aortic aneurysm or retroperitoneal lymphadenopathy. The  large and small bowel loops are normal in caliber. There is a moderate  amount of dense stool in the colon including dense stool within the  rectal ampulla that measures up to 6.7 cm and raising the concern of an  element of mild fecal impaction. The remainder of the pelvis is  unremarkable.  5. At bone windows, there is no evidence of acute compression fracture  in the thoracic or lumbar spine. No rib fracture is seen.                 Radiation dose reduction techniques were utilized, including automated  exposure control and exposure modulation based on body size.     This report was finalized on 11/15/2020 4:27 PM by Dr. Luis Felipe Lemus M.D.       CT Chest Without Contrast [[id number]] Collected: 11/15/20 1604     Updated: 11/15/20 1630    Narrative:      CT SCANS OF THE CHEST AND ABDOMEN AND PELVIS WITHOUT CONTRAST     HISTORY: Recent falls. Vaguely nodular densities in the left chest noted  on recent portable chest x-ray. Chest and abdominal pain.     The CT scans of the chest and abdomen and pelvis were performed as an  emergency procedure without contrast and compared to previous CT scans  of the abdomen and pelvis dated 04/23/2018. The following findings  are  present:  1. In the chest, there are several very small areas of somewhat  peripheral ground glass opacity in the left lung. These are nonspecific  but could relate to COVID-19 pneumonia and further clinical correlation  is recommended. One of the areas has a more nodular component measuring  up to 6 or 7 mm as seen on image 28 but likely corresponds to the same  process.  2. There is no mediastinal or hilar or axillary adenopathy. There is a  tiny pericardial effusion.  3. There are several hepatic cysts measuring up to 3.1 cm unchanged from  04/23/2018. The spleen, pancreas, both adrenal glands, and both kidneys  are unremarkable and unchanged.  4. There is no aortic aneurysm or retroperitoneal lymphadenopathy. The  large and small bowel loops are normal in caliber. There is a moderate  amount of dense stool in the colon including dense stool within the  rectal ampulla that measures up to 6.7 cm and raising the concern of an  element of mild fecal impaction. The remainder of the pelvis is  unremarkable.  5. At bone windows, there is no evidence of acute compression fracture  in the thoracic or lumbar spine. No rib fracture is seen.                 Radiation dose reduction techniques were utilized, including automated  exposure control and exposure modulation based on body size.     This report was finalized on 11/15/2020 4:27 PM by Dr. Luis Felipe Lemus M.D.       XR Hip With or Without Pelvis 2 - 3 View Left [[id number]] Collected: 11/15/20 1413     Updated: 11/15/20 1419    Narrative:      PROCEDURE:  XR HIP W OR WO PELVIS 2-3 VIEW LEFT-     HISTORY: Left hip pain after fall.     COMPARISON: None.     FINDINGS: 5 views of the pelvis and left hip were obtained. There is  degenerative disc disease in the lower lumbar spine. There is diffuse  osseous demineralization, which limits evaluation of fine osseous  detail. Within these limitations, no acute fracture is identified. There  is moderate left and mild hip  osteoarthritis with joint space narrowing,  subchondral sclerosis, and marginal osteophytes. There is no  dislocation. There is calcific atherosclerosis. If there is persistent  clinical concern for acute fracture or inability to bear weight, further  evaluation with cross-sectional imaging would be warranted.     This report was finalized on 11/15/2020 2:16 PM by Dr. Dahlia Blackburn M.D.       XR Chest 1 View [[id number]] Collected: 11/15/20 1410     Updated: 11/15/20 1416    Narrative:      XR CHEST 1 VW-     HISTORY:  Altered mental status.     COMPARISON:  Chest radiograph 04/03/2016.     FINDINGS:    2 views of the chest were obtained. The cardiac silhouette and  mediastinal and hilar contours are within normal limits. There is  calcific aortic atherosclerosis. There are nodular opacities projecting  over the mid to upper left lung and lateral left lung base, not clearly  present on prior radiographs from 2016. Recommend further evaluation  with CT chest. There is no focal consolidation. Pleural spaces are  clear. There is osseous demineralization. There is multilevel  degenerative disc disease. There is mild asymmetric elevation of the  left hemidiaphragm, which is new.     This report was finalized on 11/15/2020 2:13 PM by Dr. Dahlia Blackburn M.D.               cefTRIAXone, 1 g, Intravenous, Q24H  sodium chloride, 1,000 mL, Intravenous, Once  sodium chloride, 10 mL, Intravenous, Q12H      dextrose, 150 mL/hr        Assessment/Plan   1.  Acute kidney injury, creatinine on admission 2.5 down to 1.03 now associated with severe dehydration and hypotension, the patient has severe hypernatremia with estimated free water loss over 10 L.  2.  Hypokalemia with normal magnesium, potassium on admission was 4.4 but is down to 2.6 now.  3.  Altered mental status  4.  Severe dementia.      Plan:  1.  Check urine osmolality  2.  Check random urine for sodium, chloride, potassium and creatinine  3.  Change IV fluid to D5W to  run at 150 cc/h since the blood pressure has stabilized  4.  Check electrolytes at 2 PM and make more adjustment in her IV fluid as needed.  In order to slow down the drop in sodium.  5.  Replete potassium by using electrolyte replacement protocol  6.  Surveillance labs    Enck you for asking me to see this patient in consultation      I discussed the patient's findings and my recommendations with the patient's nurse      Eddi Ventura MD  11/16/20  11:32 EST      Much of this encounter note is an electronic transcription/translation of spoken language to printed text. The electronic translation of spoken language may permit erroneous, or at times, nonsensical words or phrases to be inadvertently transcribed; Although I have reviewed the note for such errors, some may still exist

## 2020-11-16 NOTE — SIGNIFICANT NOTE
11/16/20 0921   OTHER   Discipline speech language pathologist   Rehab Time/Intention   Session Not Performed other (see comments)  (Discussed with RN, patient not appropriate this date for clinical swallow eval secondary to poor alertness and inability to follow commands. SLP to follow for swallow evaluation as patient appropriate. )

## 2020-11-17 PROBLEM — E43 SEVERE MALNUTRITION (HCC): Status: ACTIVE | Noted: 2020-01-01

## 2020-11-17 NOTE — PLAN OF CARE
Goal Outcome Evaluation:   VSS. Electrolytes replaced per orders. TF advanced. Ready to transfer off ICU when bed available. Will CTM

## 2020-11-17 NOTE — PLAN OF CARE
Goal Outcome Evaluation:  PATIENT DOES NOT FOLLOW COMMANDS, MOANS WITH DRAW TO PAIN, VVS REPLACED POTASSIUM

## 2020-11-17 NOTE — NURSING NOTE
New labs obtained r/t results off. Finally results ok and K+ 3.2 so Kruns x's 4 started;2/4 infusing now. Phos 1.6 and Dr. Jurado notified. OK for phos protocol if renal function is ok. BP is labile and it looked like pt went into Afib. EKG obtained and Dr. Jurado notified that results were NSR. No new orders at this time. Report given to RUSLAN David.

## 2020-11-17 NOTE — SIGNIFICANT NOTE
11/17/20 1207   OTHER   Discipline speech language pathologist   Rehab Time/Intention   Session Not Performed other (see comments)  (Discussed with RN. Patient not appropriate for swallow eval this date. SLP to follow for eval as appropriate.)

## 2020-11-17 NOTE — PROGRESS NOTES
Tutu Schmidt MD                          [phone]      Patient ID:    Name:  Ann Marie Blanton    MRN:  [id number]    1933   87 y.o.  female            Patient Care Team:  Josh Gross MD as PCP - General (Family Medicine)    CC/ Reason for visit: Acute metabolic encephalopathy, severe hypernatremia, dehydration, acute renal failure    Subjective: Pt seen and examined this AM. Patient continues to be encephalopathic with mild improvement. She is being aggressively volume resuscitated with some response.  Hyponatremia has improved and renal failure is stable as well.  Still oliguric.  Some improvement in encephalopathy but clearly not liking blood draws or Dobbhoff tube or restraints    ROS: Unable to obtain due to severe encephalopathy    Objective     Vital Signs past 24hrs    BP range: BP: ()/() 109/62  Pulse range: Heart Rate:  [] 96  Resp rate range: Resp:  [18] 18  Temp range: Temp (24hrs), Av.2 °F (36.8 °C), Min:98.1 °F (36.7 °C), Max:98.5 °F (36.9 °C)      Ventilator/Non-Invasive Ventilation Settings (From admission, onward)    None          Device (Oxygen Therapy): nasal cannula       53.3 kg (117 lb 8.1 oz); Body mass index is 20.17 kg/m².      Intake/Output Summary (Last 24 hours) at 2020 0953  Last data filed at 2020 0432  Gross per 24 hour   Intake 5370 ml   Output 500 ml   Net 4870 ml       PHYSICAL EXAM   Constitutional:  Elderly cachectic white female pt in bed, No acute respiratory distress, + accessory muscle use  Head: - NCAT  Eyes: No pallor, Anicteric conjunctiva, EOMI.  ENMT:  Mallampati 2, no oral thrush.  Dry MM.   NECK: Trachea midline, No thyromegaly, no palpable cervical LNpathy  Heart: RRR, no murmur. No pedal edema   Lungs: LUDIVINA +, tachypnea, no wheezes/ crackles heard    Abdomen: Soft.  Scaphoid no tenderness, guarding or rigidity. No palpable masses  Extremities: Extremities warm and well perfused. No  cyanosis/ clubbing  Neuro: Conscious, responding to verbal stimuli, no gross focal neuro deficits  Psych: Mood and affect uto    PPE recommended per [facility] infectious disease Isolation protocol for the current clinical scenario(as mentioned below) was followed.     Scheduled meds:  cefTRIAXone, 1 g, Intravenous, Q24H  midodrine, 10 mg, Oral, TID AC  potassium chloride, 40 mEq, Oral, Q4H  potassium phosphate, 15 mmol, Intravenous, Once  sodium chloride, 10 mL, Intravenous, Q12H        IV meds:                      dextrose, 150 mL/hr, Last Rate: 150 mL/hr (11/17/20 0659)        Data Review:      Results from last 7 days   Lab Units 11/17/20  0751 11/16/20  2106 11/16/20  0851 11/16/20  0457 11/15/20  1311   SODIUM mmol/L 154* 160* 162*  --  172*   POTASSIUM mmol/L 3.0* 3.2* 2.7*  --  4.4   CHLORIDE mmol/L 126* 131* 129*  --  128*   CO2 mmol/L 21.2* 21.3* 22.9  --  25.0   BUN mg/dL 29* 39* 54*  --  81*   CREATININE mg/dL 0.60 0.77 1.03*  --  2.50*   CALCIUM mg/dL 7.3* 7.4* 7.6*  --  9.2   BILIRUBIN mg/dL 0.3  --   --   --  0.4   ALK PHOS U/L 56  --   --   --  63   ALT (SGPT) U/L 12  --   --   --  13   AST (SGOT) U/L 16  --   --   --  11   GLUCOSE mg/dL 199* 132* 88  --  192*   WBC 10*3/mm3  --   --   --  27.76* 33.98*   HEMOGLOBIN g/dL  --   --   --  12.9 14.5   PLATELETS 10*3/mm3  --   --   --  213 337   INR  1.52*  --   --  1.52*  --    PROCALCITONIN ng/mL  --   --   --   --  0.34*       Lab Results   Component Value Date    CALCIUM 7.3 (L) 11/17/2020    PHOS 0.9 (C) 11/17/2020       Results from last 7 days   Lab Units 11/15/20  1451 11/15/20  1436   BLOODCX  No growth at 24 hours No growth at 24 hours       Results from last 7 days   Lab Units 11/15/20  1327   PH, ARTERIAL pH units 7.401   PO2 ART mm Hg 91.0   PCO2, ARTERIAL mm Hg 36.5   HCO3 ART mmol/L 22.6        Results Review:    I have reviewed the relevant laboratory results and independently reviewed the chest imaging from this hospitalization  including the available echocardiogram reports personally and summarized it if/ when appropriate below    Assessment    Acute metabolic encephalopathy  Recent fall without any injury  Urinary tract infection   Sepsis with hypotension  Severe dehydration  Severe life-threatening hypernatremia  Acute renal failure  Refeeding syndrome  Severe dysphagia s/p DHT  Hypertension  H/o anorexia  Severe PCM  Advanced dementia  DNR    PLAN:  Patient remains encephalopathic with some improvement.  C/w aggressive volume resuscitation. Several litres behind still.   Sodium and other electrolytes are improving and being corrected by nephrology.  Appreciate their input.  Discussed with Dr. Silva  C/w Dobbhoff tube and tube feeds.  Unfortunately with refeeding syndrome and severe hypophosphatemia and will need coordination with nutrition.  Discussed with them as well.    Continue antibiotics to finish a course while we await cultures.  DNR    Very guarded prognosis given overall poor baseline health with multiple new issues mainly from advanced dementia and poor nutrition.      Long discussion with the patient's daughter and I have explained to her that her mom is not making any significant progress and her current issues are mainly from severe malnutrition and dehydration due to poor p.o. intake and given advanced dementia she will continue to have these issues in spite of maximum correction through inpatient stay.  She feels that she should be given some more time and request to restart Megace.  I have told her that she is in no shape to take p.o. diet at this point and would consider that once we feel that she is safe for that.    Discussed with Dr. Silva and she reports that the same concerns that patient should be made hospice at this point and is not benefiting from current aggressive treatment.  I have placed a palliative care consult but cancelled it as the patient's daughter reportedly had a bad experience previously with  her dad's palliative care and does not want to talk to them but overall understands that her mom might be heading towards need for comfort measures only. D/w with palliative RN      She was offered to come and see her BUT she would like to avoid hospital visit due to concern for coronavirus and is interested in video conference if the nurses can set it up. I have asked her to coordinate with the nurse and see her mom.    CC-80 mins    Tutu Schmidt MD  11/17/2020

## 2020-11-17 NOTE — PROGRESS NOTES
"   LOS: 2 days    Patient Care Team:  Josh Gross MD as PCP - General (Family Medicine)    Chief Complaint:    Chief Complaint   Patient presents with   • Altered Mental Status     Follow UP SIMA, hypernatremia  Subjective     Interval History:     Patient moans when stimulated. Not verbal.  cc overnight.   BP in 90's to 100.   Review of Systems:   Unable to obtain.  RN reports smear of BM this am.   Tolerating TF    Objective     Vital Signs  Temp:  [98.1 °F (36.7 °C)-98.5 °F (36.9 °C)] 98.2 °F (36.8 °C)  Heart Rate:  [] 96  Resp:  [18] 18  BP: ()/() 109/62    Flowsheet Rows      First Filed Value   Admission Height  162.6 cm (64\") Documented at 11/15/2020 1317   Admission Weight  49.9 kg (110 lb) Documented at 11/15/2020 1317          No intake/output data recorded.  I/O last 3 completed shifts:  In: 7329 [I.V.:6753; Other:200; NG/GT:195; IV Piggyback:181]  Out: 800 [Urine:800]    Intake/Output Summary (Last 24 hours) at 11/17/2020 0921  Last data filed at 11/17/2020 0432  Gross per 24 hour   Intake 5370 ml   Output 500 ml   Net 4870 ml       Physical Exam:   Elderly, cachectic. Nasal cortrak. No scleral icterus  Moans with stimulation.   Oral mucosa dry  Neck no jvd  Heart RRR no s3 or rub  Lungs clear to auscultation, no wheezing  Abd scaphoid.  + bs, soft, no grimace to palpation  Ext no edema. No muscle mass  Neuro not verbal.   Skin no rash .  Coccyx pressure injury. Bilateral heel pressure injuries. Reviewed wound care note .     Results Review:    Results from last 7 days   Lab Units 11/17/20  0751 11/16/20  2106 11/16/20  0851 11/15/20  1311   SODIUM mmol/L 154* 160* 162* 172*   POTASSIUM mmol/L 3.0* 3.2* 2.7* 4.4   CHLORIDE mmol/L 126* 131* 129* 128*   CO2 mmol/L 21.2* 21.3* 22.9 25.0   BUN mg/dL 29* 39* 54* 81*   CREATININE mg/dL 0.60 0.77 1.03* 2.50*   CALCIUM mg/dL 7.3* 7.4* 7.6* 9.2   BILIRUBIN mg/dL 0.3  --   --  0.4   ALK PHOS U/L 56  --   --  63   ALT (SGPT) U/L 12  " --   --  13   AST (SGOT) U/L 16  --   --  11   GLUCOSE mg/dL 199* 132* 88 192*       Estimated Creatinine Clearance: 41.7 mL/min (by C-G formula based on SCr of 0.6 mg/dL).    Results from last 7 days   Lab Units 11/17/20  0751 11/16/20  2106 11/16/20  0457 11/15/20  1311   MAGNESIUM mg/dL 1.7  --  2.6* 2.8*   PHOSPHORUS mg/dL 0.9* 1.6* 2.2*  --        Results from last 7 days   Lab Units 11/17/20  0751   URIC ACID mg/dL 6.8*       Results from last 7 days   Lab Units 11/16/20  0457 11/15/20  1311   WBC 10*3/mm3 27.76* 33.98*   HEMOGLOBIN g/dL 12.9 14.5   PLATELETS 10*3/mm3 213 337       Results from last 7 days   Lab Units 11/17/20  0751 11/16/20  0457   INR  1.52* 1.52*         Imaging Results (Last 24 Hours)     ** No results found for the last 24 hours. **        cefTRIAXone, 1 g, Intravenous, Q24H  midodrine, 10 mg, Oral, TID AC  potassium chloride, 40 mEq, Oral, Q4H  potassium phosphate, 15 mmol, Intravenous, Once  sodium chloride, 10 mL, Intravenous, Q12H      dextrose, 150 mL/hr, Last Rate: 150 mL/hr (11/17/20 0659)        Medication Review:   Current Facility-Administered Medications   Medication Dose Route Frequency Provider Last Rate Last Dose   • aluminum-magnesium hydroxide-simethicone (MAALOX MAX) 400-400-40 MG/5ML suspension 15 mL  15 mL Oral Q6H PRN Tutu Schmidt MD       • cefTRIAXone (ROCEPHIN) IVPB 1 g  1 g Intravenous Q24H Tutu Schmidt  mL/hr at 11/16/20 1340 1 g at 11/16/20 1340   • dextrose (D5W) 5 % infusion  150 mL/hr Intravenous Continuous Audrey, Eddi Moya  mL/hr at 11/17/20 0659 150 mL/hr at 11/17/20 0659   • ipratropium-albuterol (DUO-NEB) nebulizer solution 3 mL  3 mL Nebulization Q6H PRN Tutu Schmidt MD       • labetalol (NORMODYNE,TRANDATE) injection 20 mg  20 mg Intravenous Q10 Min PRN Tutu Schmidt MD       • magnesium hydroxide (MILK OF MAGNESIA) suspension 2400 mg/10mL 10 mL  10 mL Oral Daily PRN Tutu Schmidt,  MD       • midodrine (PROAMATINE) tablet 10 mg  10 mg Oral TID AC Tutu Schmidt MD       • ondansetron (ZOFRAN) injection 4 mg  4 mg Intravenous Q6H PRN Tutu Schmidt MD       • potassium chloride (KLOR-CON) packet 40 mEq  40 mEq Oral Q4H Mary Silva MD       • potassium chloride 10 mEq in 100 mL IVPB  10 mEq Intravenous Q1H PRN Eddi Ventura  mL/hr at 11/17/20 0615 10 mEq at 11/17/20 0615   • potassium phosphate 15 mmol in sodium chloride 0.9 % 100 mL infusion  15 mmol Intravenous Once Mary Silva MD       • sennosides-docusate (PERICOLACE) 8.6-50 MG per tablet 2 tablet  2 tablet Oral Nightly PRN Tutu Schmidt MD       • sodium chloride 0.9 % flush 10 mL  10 mL Intravenous PRN Emergency, Triage Protocol, MD       • sodium chloride 0.9 % flush 10 mL  10 mL Intravenous PRN Jace Macias II, MD       • sodium chloride 0.9 % flush 10 mL  10 mL Intravenous Q12H Tutu Schmidt MD   10 mL at 11/16/20 2246   • sodium chloride 0.9 % flush 10 mL  10 mL Intravenous PRN Tutu Schmidt MD   10 mL at 11/15/20 1749       Assessment/Plan   1. Nilson Resolved.  2.  Hypernatremia.  Sodium improving slowly.  On free water per IV and enteral.  Will increase enteral water.  3. Hypophosphatemia likely due to refeeding.  Suspect she was not eating or drinking at all.  Replace and recheck.   4. Hypokalemia.  Replace.  5. PCM.  Tube feed with likely refeeding hypophosphatemia.   6. Acute metabolic encephalopathy superimposed on Dementia.  7. Leukocytosis. On rocephin day 2.  Pyuria.  Urine culture not sent.     Recommend consideration of palliative care.     Mary Silva MD  11/17/20  09:21 EST

## 2020-11-17 NOTE — PROGRESS NOTES
Adult Nutrition  Assessment/PES    Patient Name:  Ann Marie Blanton  YOB: 1933  MRN: [id number]  Admit Date:  11/15/2020    Assessment Date:  11/17/2020    Comments:  Nutrition follow up. Labs noted, K and Phos being replaced. Na+ down to 154 today. Free water increased to 200 q 4 hrs and continuing D5W. TF still at 20cc/hr with plans to increase slowly. Goal for feeds remains 60cc/hr.  Discussed constipation issue with RN - she will give some of the prn laxatives today.  Will cont to follow clinical course, nutrition support needs.    Reason for Assessment     Row Name 11/17/20 1109          Reason for Assessment    Reason For Assessment  TF/PN;follow-up protocol         Nutrition/Diet History     Row Name 11/17/20 1109          Nutrition/Diet History    Factors Affecting Nutritional Intake  impaired cognitive status/motor control;difficulty/impaired swallowing         Anthropometrics     Row Name 11/17/20 0432          Anthropometrics    Weight  53.3 kg (117 lb 8.1 oz)         Labs/Tests/Procedures/Meds     Row Name 11/17/20 1110          Labs/Procedures/Meds    Lab Results Reviewed  reviewed, pertinent     Lab Results Comments  Na+ 154, K+3, Phos .9, Glu, alb, wbc        Diagnostic Tests/Procedures    Diagnostic Test/Procedure Reviewed  reviewed, pertinent        Medications    Pertinent Medications Reviewed  reviewed, pertinent     Pertinent Medications Comments  rosephin, kcl, D5W, kphos, prn laxatives         Physical Findings     Row Name 11/17/20 1111          Physical Findings    Overall Physical Appearance  underweight     Gastrointestinal  constipation;feeding tube     Tubes  nasogastric tube     Oral/Mouth Cavity  poor dentition     Skin  pressure injury;other (see comments) PI coccyx, heels, B=11           Nutrition Prescription Ordered     Row Name 11/17/20 1113          Nutrition Prescription PO    Current PO Diet  NPO        Nutrition Prescription EN    Enteral Route  NG     Product   Fibersource HN (Jevity 1.2)     TF Delivery Method  Continuous     Continuous TF Goal Rate (mL/hr)  60 mL/hr     Continuous TF Current Rate (mL/hr)  20 mL/hr     Water flush (mL)   200 mL     Water Flush Frequency  Every 4 hours         Evaluation of Received Nutrient/Fluid Intake     Row Name 11/17/20 1114          Calories Evaluation    Enteral Calories (kcal)  576     % of Kcal Needs  34        Protein Evaluation    Enteral Protein (gm)  25.92     % of Protein Needs  36        Intake Assessment    Energy/Calorie Requirement Assessment  not meeting needs     Protein Requirement Assessment  not meeting needs     Fluid Requirement Assessment  not meeting needs     Tolerance  tolerating        Fluid Intake Evaluation    Enteral (Free Water) Fluid (mL)  388.8     Free Water Flush Fluid (mL)  1200     Total Free Water Intake (mL)  1588.8 mL     IV Fluid (mL)  3600        EN Evaluation    TF Changes  Increase free water     TF Residual  0     TF Tolerance  Other (comment) No BM     HOB  Greater than or equal to 30 degress         Problem/Interventions:    Intervention Goal     Row Name 11/17/20 1115          Intervention Goal    General  Maintain nutrition;Nutrition support treatment;Disease management/therapy;Reduce/improve symptoms;Improved nutrition related lab(s);Meet nutritional needs for age/condition     TF/PN  Tolerate TF at goal     Weight  Appropriate weight gain         Nutrition Intervention     Row Name 11/17/20 1115          Nutrition Intervention    RD/Tech Action  Care plan reviewd;Follow Tx progress         Nutrition Prescription     Row Name 11/17/20 1116          Nutrition Prescription EN    Enteral Prescription  Continue same protocol         Education/Evaluation     Row Name 11/17/20 1116          Education    Education  Will Instruct as appropriate        Monitor/Evaluation    Monitor  Per protocol;Skin status;Symptoms;I&O;Pertinent labs;TF delivery/tolerance;Weight           Electronically signed  by:  Shayy Macias RD  11/17/20 11:19 EST

## 2020-11-18 NOTE — CONSULTS
Received hospice consult. Our office called daughter to schedule visit. She would only agree to phone explanation of service. We have nurse scheduled to speak with her tomorrow. I will update [facility] tomorrow if daughter is agreeable to services after speaking with us. Thank you for the referral.    Letty Lo, RN  [facility]  [phone]

## 2020-11-18 NOTE — PROGRESS NOTES
Continued Stay Note  [facility]     Patient Name: Ann Marie Blanton  MRN: [id number]  Today's Date: 11/18/2020    Admit Date: 11/15/2020    Discharge Plan     Row Name 11/18/20 0811       Plan    Plan Comments  [facility]P following for discharge needs as the patient's clinical course evolves. Plan is to return to [facility] if they can accept her back.  Daughter declines palliative/Hospice referrals  [facility]P following.  [facility] following        Discharge Codes    No documentation.             Bita Wade RN

## 2020-11-18 NOTE — PROGRESS NOTES
Adult Nutrition  Assessment/PES    Patient Name:  Ann Marie Blanton  YOB: 1933  MRN: [id number]  Admit Date:  11/15/2020    Assessment Date:  11/18/2020    Comments:  Nutrition follow up.  TF adv to 30cc/hr.  Labs reviewed - continues to need phos replacement. Na+ trending down and now 146.  Had 2 BM's yesterday. Received MOM yesterday. Will cont to follow clinical course, slow adv to goal of 60cc/hr with TF.     Reason for Assessment     Row Name 11/18/20 0859          Reason for Assessment    Reason For Assessment  TF/PN;follow-up protocol         Nutrition/Diet History     Row Name 11/18/20 0859          Nutrition/Diet History    Factors Affecting Nutritional Intake  impaired cognitive status/motor control;difficulty/impaired swallowing         Anthropometrics     Row Name 11/18/20 0451          Anthropometrics    Weight  53.7 kg (118 lb 6.2 oz)         Labs/Tests/Procedures/Meds     Row Name 11/18/20 0859          Labs/Procedures/Meds    Lab Results Reviewed  reviewed, pertinent     Lab Results Comments  Na+ 146, Phos 1.3, Glu, Cr, plts, wbc alb        Diagnostic Tests/Procedures    Diagnostic Test/Procedure Reviewed  reviewed, pertinent        Medications    Pertinent Medications Reviewed  reviewed, pertinent     Pertinent Medications Comments  asa, rocephin, megace, kphos, B12, D5W         Physical Findings     Row Name 11/18/20 0901          Physical Findings    Overall Physical Appearance  underweight;on oxygen therapy     Gastrointestinal  constipation;feeding tube     Tubes  nasogastric tube     Oral/Mouth Cavity  poor dentition     Skin  pressure injury;other (see comments) PI to coccyx and heels, B=11           Nutrition Prescription Ordered     Row Name 11/18/20 0902          Nutrition Prescription PO    Current PO Diet  NPO        Nutrition Prescription EN    Enteral Route  NG     Product  Fibersource HN (Jevity 1.2)     TF Delivery Method  Continuous     Continuous TF Goal Rate (mL/hr)   60 mL/hr     Continuous TF Current Rate (mL/hr)  30 mL/hr     Water flush (mL)   200 mL     Water Flush Frequency  Every 4 hours         Evaluation of Received Nutrient/Fluid Intake     Row Name 11/18/20 0902          Calories Evaluation    Enteral Calories (kcal)  864     % of Kcal Needs  51        Protein Evaluation    Enteral Protein (gm)  38.88     % of Protein Needs  54        Intake Assessment    Energy/Calorie Requirement Assessment  not meeting needs     Protein Requirement Assessment  not meeting needs     Fluid Requirement Assessment  not meeting needs     Tolerance  tolerating        Fluid Intake Evaluation    Enteral (Free Water) Fluid (mL)  583.2     Free Water Flush Fluid (mL)  1200     Total Free Water Intake (mL)  1783.2 mL     IV Fluid (mL)  3000        EN Evaluation    TF Residual  0     TF Tolerance  Other (comment) 2 BM's yesterday     HOB  Greater than or equal to 30 degress         Problem/Interventions:    Intervention Goal     Row Name 11/18/20 0904          Intervention Goal    General  Maintain nutrition;Disease management/therapy;Nutrition support treatment;Reduce/improve symptoms;Meet nutritional needs for age/condition;Improved nutrition related lab(s)     TF/PN  Tolerate TF at goal     Weight  Appropriate weight gain         Nutrition Intervention     Row Name 11/18/20 0904          Nutrition Intervention    RD/Tech Action  Care plan reviewd;Follow Tx progress         Nutrition Prescription     Row Name 11/18/20 0904          Nutrition Prescription EN    Enteral Prescription  Continue same protocol         Education/Evaluation     Row Name 11/18/20 0904          Education    Education  Will Instruct as appropriate;Education not appropriate at this time     Please explain  Patient nonverbal;Patient confusion        Monitor/Evaluation    Monitor  Symptoms;Skin status;Per protocol;Pertinent labs;TF delivery/tolerance;Weight;I&O           Electronically signed by:  Shayy Macias  ABBY  11/18/20 09:05 EST

## 2020-11-18 NOTE — PROGRESS NOTES
"   LOS: 3 days    Patient Care Team:  Josh Gross MD as PCP - General (Family Medicine)    Chief Complaint:    Chief Complaint   Patient presents with   • Altered Mental Status     Follow UP SIMA, hypernatremia  Subjective     Interval History:   Eyes open.  Does seem to localize to voice but does not make eye contact.  Does not follow any commands. Dr. Schmidt discussion with Daughter noted regarding goals of care.     Review of Systems:   Unable to obtain    Objective     Vital Signs  Temp:  [97.6 °F (36.4 °C)-98.7 °F (37.1 °C)] 97.7 °F (36.5 °C)  Heart Rate:  [] 95  Resp:  [18-20] 20  BP: ()/(45-96) 95/55    Flowsheet Rows      First Filed Value   Admission Height  162.6 cm (64\") Documented at 11/15/2020 1317   Admission Weight  49.9 kg (110 lb) Documented at 11/15/2020 1317          No intake/output data recorded.  I/O last 3 completed shifts:  In: 7682 [I.V.:5728; Other:1069; NG/GT:835; IV Piggyback:50]  Out: 3800 [Urine:3800]    Intake/Output Summary (Last 24 hours) at 11/18/2020 0742  Last data filed at 11/18/2020 0500  Gross per 24 hour   Intake 5711 ml   Output 3300 ml   Net 2411 ml       Physical Exam:  Elderly, cachectic. Nasal cortrak. No scleral icterus. Does turn her head a little to voice to localize, but no eye contact   Oral mucosa dry. Mouth breathing  Neck no jvd  Heart RRR no s3 or rub  Lungs clear to auscultation, no wheezing  Abd scaphoid.  + bs, soft, no grimace to palpation  Ext no edema. No muscle mass  Neuro not verbal.   Skin no rash .  Coccyx pressure injury. Bilateral heel pressure injuries. Reviewed wound care note .     Results Review:    Results from last 7 days   Lab Units 11/18/20  0600 11/17/20  1404 11/17/20  0751  11/15/20  1311   SODIUM mmol/L 146* 150* 154*   < > 172*   POTASSIUM mmol/L 3.8 3.6 3.0*   < > 4.4   CHLORIDE mmol/L 116* 121* 126*   < > 128*   CO2 mmol/L 20.1* 22.6 21.2*   < > 25.0   BUN mg/dL 15 23 29*   < > 81*   CREATININE mg/dL 0.47* 0.62 " 0.60   < > 2.50*   CALCIUM mg/dL 7.2* 7.5* 7.3*   < > 9.2   BILIRUBIN mg/dL 0.3  --  0.3  --  0.4   ALK PHOS U/L 68  --  56  --  63   ALT (SGPT) U/L 24  --  12  --  13   AST (SGOT) U/L 41*  --  16  --  11   GLUCOSE mg/dL 123* 146* 199*   < > 192*    < > = values in this interval not displayed.       Estimated Creatinine Clearance: 42 mL/min (A) (by C-G formula based on SCr of 0.47 mg/dL (L)).    Results from last 7 days   Lab Units 11/18/20  0600 11/17/20  1404 11/17/20  0751 11/16/20  0457   MAGNESIUM mg/dL 1.7  --  1.7  --  2.6*   PHOSPHORUS mg/dL 1.3* 2.0* 0.9*   < > 2.2*    < > = values in this interval not displayed.       Results from last 7 days   Lab Units 11/17/20  0751   URIC ACID mg/dL 6.8*       Results from last 7 days   Lab Units 11/17/20  0751 11/16/20  0457 11/15/20  1311   WBC 10*3/mm3 18.63* 27.76* 33.98*   HEMOGLOBIN g/dL 10.3* 12.9 14.5   PLATELETS 10*3/mm3 149 213 337       Results from last 7 days   Lab Units 11/18/20  0600 11/17/20  0751 11/16/20  0457   INR  1.37* 1.52* 1.52*         Imaging Results (Last 24 Hours)     ** No results found for the last 24 hours. **        cefTRIAXone, 1 g, Intravenous, Q24H  midodrine, 10 mg, Oral, TID AC  sodium chloride, 10 mL, Intravenous, Q12H      dextrose, 150 mL/hr, Last Rate: 150 mL/hr (11/18/20 0342)        Medication Review:   Current Facility-Administered Medications   Medication Dose Route Frequency Provider Last Rate Last Admin   • aluminum-magnesium hydroxide-simethicone (MAALOX MAX) 400-400-40 MG/5ML suspension 15 mL  15 mL Oral Q6H PRN Tutu Schmidt MD       • cefTRIAXone (ROCEPHIN) IVPB 1 g  1 g Intravenous Q24H Tutu Schmidt  mL/hr at 11/17/20 1000 1 g at 11/17/20 1000   • dextrose (D5W) 5 % infusion  150 mL/hr Intravenous Continuous Audrey, Eddi Moya  mL/hr at 11/18/20 0342 150 mL/hr at 11/18/20 0342   • ipratropium-albuterol (DUO-NEB) nebulizer solution 3 mL  3 mL Nebulization Q6H PRN Tutu Schmidt  MD Sung       • labetalol (NORMODYNE,TRANDATE) injection 20 mg  20 mg Intravenous Q10 Min PRN Tutu Schmidt MD       • magnesium hydroxide (MILK OF MAGNESIA) suspension 2400 mg/10mL 10 mL  10 mL Oral Daily PRN Tutu Schmidt MD   10 mL at 11/17/20 1229   • midodrine (PROAMATINE) tablet 10 mg  10 mg Oral TID AC Tutu Schmidt MD   10 mg at 11/18/20 0646   • ondansetron (ZOFRAN) injection 4 mg  4 mg Intravenous Q6H PRN Tutu Schmidt MD       • potassium chloride 10 mEq in 100 mL IVPB  10 mEq Intravenous Q1H PRN Eddi Ventura  mL/hr at 11/17/20 0615 10 mEq at 11/17/20 0615   • sennosides-docusate (PERICOLACE) 8.6-50 MG per tablet 2 tablet  2 tablet Oral Nightly PRN Tutu Schmidt MD       • sodium chloride 0.9 % flush 10 mL  10 mL Intravenous PRN Emergency, Triage Protocol, MD       • sodium chloride 0.9 % flush 10 mL  10 mL Intravenous PRN Jace Macias II, MD       • sodium chloride 0.9 % flush 10 mL  10 mL Intravenous Q12H Tutu Schmidt MD   10 mL at 11/17/20 2003   • sodium chloride 0.9 % flush 10 mL  10 mL Intravenous PRN Tutu Schmidt MD   10 mL at 11/15/20 1749       Assessment/Plan   1. Nilson Resolved.  2.  Hypernatremia.  Sodium improving .  On free water per IV and enteral.  Continue both, but reduce IVF rate.  3. Hypophosphatemia likely due to refeeding.  Suspect she was not eating or drinking at all.  Replace and recheck.   4. Hypokalemia.  Replaced.  5. PCM.  Tube feed with likely refeeding hypophosphatemia.   6. Acute metabolic encephalopathy superimposed on Dementia.  7. Leukocytosis. On rocephin day 3.  Pyuria.  Urine culture pending.       Mary Silva MD  11/18/20  07:42 EST

## 2020-11-18 NOTE — PROGRESS NOTES
Tutu Schmidt MD                          [phone]      Patient ID:    Name:  Ann Marie Blatnon    MRN:  [id number]    1933   87 y.o.  female            Patient Care Team:  Josh Gross MD as PCP - General (Family Medicine)    CC/ Reason for visit: Acute metabolic encephalopathy, severe hypernatremia, dehydration, acute renal failure    Subjective: Pt seen and examined this AM. Patient continues to be encephalopathic with mild improvement. She is being aggressively volume resuscitated with some response.  Hyponatremia has improved and renal failure is stable as well.  Still oliguric.  Some improvement in encephalopathy but clearly not liking blood draws or Dobbhoff tube or restraints    ROS: Unable to obtain due to severe encephalopathy    Objective     Vital Signs past 24hrs    BP range: BP: ()/(45-96) 95/55  Pulse range: Heart Rate:  [] 95  Resp rate range: Resp:  [18-20] 20  Temp range: Temp (24hrs), Av.1 °F (36.7 °C), Min:97.6 °F (36.4 °C), Max:98.7 °F (37.1 °C)      Ventilator/Non-Invasive Ventilation Settings (From admission, onward)    None          Device (Oxygen Therapy): nasal cannula       53.7 kg (118 lb 6.2 oz); Body mass index is 20.32 kg/m².      Intake/Output Summary (Last 24 hours) at 2020 0847  Last data filed at 2020 0500  Gross per 24 hour   Intake 5711 ml   Output 3300 ml   Net 2411 ml       PHYSICAL EXAM   Constitutional:  Elderly cachectic white female pt in bed, No acute respiratory distress, + accessory muscle use  Head: - NCAT  Eyes: No pallor, Anicteric conjunctiva, EOMI.  ENMT:  Mallampati 2, no oral thrush.  Dry MM.   NECK: Trachea midline, No thyromegaly, no palpable cervical LNpathy  Heart: RRR, no murmur. No pedal edema   Lungs: LUDIVINA +, tachypnea, no wheezes/ crackles heard    Abdomen: Soft.  Scaphoid no tenderness, guarding or rigidity. No palpable masses  Extremities: Extremities warm and well perfused. No  cyanosis/ clubbing  Neuro: Conscious, responding to verbal stimuli, no gross focal neuro deficits  Psych: Mood and affect uto    PPE recommended per [facility] [place], operated by [facility] infectious disease Isolation protocol for the current clinical scenario(as mentioned below) was followed.     Scheduled meds:  cefTRIAXone, 1 g, Intravenous, Q24H  midodrine, 10 mg, Oral, TID AC  potassium phosphate, 21 mmol, Intravenous, Once  sodium chloride, 10 mL, Intravenous, Q12H        IV meds:                      dextrose, 125 mL/hr, Last Rate: 125 mL/hr (11/18/20 0756)        Data Review:      Results from last 7 days   Lab Units 11/18/20  0600 11/17/20  1404 11/17/20  0751  11/16/20  0457 11/15/20  1311   SODIUM mmol/L 146* 150* 154*   < >  --  172*   POTASSIUM mmol/L 3.8 3.6 3.0*   < >  --  4.4   CHLORIDE mmol/L 116* 121* 126*   < >  --  128*   CO2 mmol/L 20.1* 22.6 21.2*   < >  --  25.0   BUN mg/dL 15 23 29*   < >  --  81*   CREATININE mg/dL 0.47* 0.62 0.60   < >  --  2.50*   CALCIUM mg/dL 7.2* 7.5* 7.3*   < >  --  9.2   BILIRUBIN mg/dL 0.3  --  0.3  --   --  0.4   ALK PHOS U/L 68  --  56  --   --  63   ALT (SGPT) U/L 24  --  12  --   --  13   AST (SGOT) U/L 41*  --  16  --   --  11   GLUCOSE mg/dL 123* 146* 199*   < >  --  192*   WBC 10*3/mm3 15.69*  --  18.63*  --  27.76* 33.98*   HEMOGLOBIN g/dL 11.5*  --  10.3*  --  12.9 14.5   PLATELETS 10*3/mm3 119*  --  149  --  213 337   INR  1.37*  --  1.52*  --  1.52*  --    PROCALCITONIN ng/mL  --   --   --   --   --  0.34*    < > = values in this interval not displayed.       Lab Results   Component Value Date    CALCIUM 7.2 (L) 11/18/2020    PHOS 1.3 (C) 11/18/2020       Results from last 7 days   Lab Units 11/15/20  1451 11/15/20  1436   BLOODCX  No growth at 2 days No growth at 2 days       Results from last 7 days   Lab Units 11/15/20  1327   PH, ARTERIAL pH units 7.401   PO2 ART mm Hg 91.0   PCO2, ARTERIAL mm Hg 36.5   HCO3 ART mmol/L 22.6        Results Review:    I have reviewed the  relevant laboratory results and independently reviewed the chest imaging from this hospitalization including the available echocardiogram reports personally and summarized it if/ when appropriate below    Assessment    Acute metabolic encephalopathy  Recent fall without any injury  Urinary tract infection   Sepsis with hypotension  Severe dehydration  Severe life-threatening hypernatremia  Acute renal failure  Refeeding syndrome  Severe dysphagia s/p DHT  Hypertension  H/o anorexia  Severe PCM  Advanced dementia  DNR    PLAN:  Patient remains encephalopathic and not following commands and appears clearly uncomfortable  C/w aggressive volume resuscitation. Sodium and other electrolytes are improving and being corrected by nephrology. Discussed with Dr. Silva  C/w Dobbhoff tube and tube feeds. Now with refeeding syndrome and severe hypophosphatemia.  Continue antibiotics to finish a course while we await cultures.  DNR    Very guarded prognosis given overall poor baseline health with multiple new issues mainly from advanced dementia and poor nutrition.  Spoke with cirilo Sue a couple times and she continues to hope that she will recover.  I have expressed significant concerns with regards to her overall quality of life and strongly recommended to consider hospice care but patient she would like to wait a few more days.  We will respect her wishes.    Addendum - Patient with persistent issues with hypotension all afternoon requiring several fluid boluses and albumin bolus with not much response.  Discussed with nurse multiple times.  Long d/w daughter Linette again and I have told her that her mom is not doing well and clearly going the wrong direction and has not made any progress in the last 3 days in spite of improvement in her blood work.  I told her that we are clearly keeping her in pain as we are not able to give her pain medication due to concerns for low blood pressure and she understands and is okay to  "give low-dose morphine to keep her comfortable.  She agrees that we should not \"escalate care\" but continue the current treatment plan.    Plan is for DNR/DNI. No escalation of care. Continue current treatment. Will tx pt to floor.    CC- 32 mins    Tutu Schmidt MD  11/18/2020  "

## 2020-11-18 NOTE — SIGNIFICANT NOTE
11/18/20 1049   OTHER   Discipline speech language pathologist   Rehab Time/Intention   Session Not Performed other (see comments)  (Discussed with RN. Pt continues to be non-verbal and is not following commands. ST will continue to follow and eval when appropriate. Pt has Cortrak for nutrition and meds.)

## 2020-11-18 NOTE — PLAN OF CARE
Goal Outcome Evaluation:      Vss. Remains in restraints, pt confused and pulling at lines/ tubes.

## 2020-11-19 PROBLEM — E87.6 HYPOKALEMIA: Status: ACTIVE | Noted: 2020-01-01

## 2020-11-19 PROBLEM — F50.00 ANOREXIA NERVOSA: Status: ACTIVE | Noted: 2020-01-01

## 2020-11-19 PROBLEM — B95.62 MRSA BACTEREMIA: Status: ACTIVE | Noted: 2020-01-01

## 2020-11-19 PROBLEM — E87.0 HYPERNATREMIA: Status: ACTIVE | Noted: 2020-01-01

## 2020-11-19 PROBLEM — E83.39 HYPOPHOSPHATEMIA: Status: ACTIVE | Noted: 2020-01-01

## 2020-11-19 PROBLEM — N17.9 AKI (ACUTE KIDNEY INJURY) (HCC): Status: ACTIVE | Noted: 2020-01-01

## 2020-11-19 PROBLEM — R78.81 MRSA BACTEREMIA: Status: ACTIVE | Noted: 2020-01-01

## 2020-11-19 PROBLEM — J96.02 ACUTE RESPIRATORY FAILURE WITH HYPERCAPNIA (HCC): Status: ACTIVE | Noted: 2020-01-01

## 2020-11-19 NOTE — PROGRESS NOTES
[facility]  Clinical Pharmacy Department     Vancomycin Pharmacokinetic Note    Ann Marie Blanton is a 87 y.o. female who is on day 1 of pharmacy to dose vancomycin for MRSA bacteremia.    Target level: AUC24 400-600  Consulting Provider:  Lynne Weston  Current Vancomycin Dose:   1250 mg (23 mg/kg) IV every  24  hours  Planned Duration of Therapy: TBD  Other Antimicrobials: Ceftriaxone 1g IV q24h    Allergies  Allergies as of 11/15/2020 - Reviewed 11/15/2020   Allergen Reaction Noted   • Sulfa antibiotics Other (See Comments) 10/28/2015       Microbiology:  Microbiology Results (last 10 days)     Procedure Component Value - Date/Time    Blood Culture - Blood, Arm, Left [[id number]] Collected: 11/15/20 1451    Lab Status: Preliminary result Specimen: Blood from Arm, Left Updated: 11/18/20 1530     Blood Culture No growth at 3 days    Blood Culture - Blood, Hand, Left [[id number]]  (Abnormal) Collected: 11/15/20 1436    Lab Status: Preliminary result Specimen: Blood from Hand, Left Updated: 11/19/20 0637     Blood Culture Staphylococcus aureus, MRSA     Comment: Infectious disease consultation is highly recommended to rule out distant foci of infection.  Methicillin resistant Staphylococcus aureus, Patient may be an isolation risk.        Gram Stain Aerobic Bottle Gram positive cocci in clusters    Blood Culture ID, PCR - Blood, Hand, Left [[id number]]  (Abnormal) Collected: 11/15/20 1436    Lab Status: Final result Specimen: Blood from Hand, Left Updated: 11/18/20 2213     BCID, PCR Staphylococcus aureus. mecA (methicillin resistance gene) detected. Identification by BCID PCR.     BOTTLE TYPE Aerobic Bottle    Urine Culture - Urine, Urine, Catheter [[id number]] Collected: 11/15/20 1347    Lab Status: Final result Specimen: Urine, Catheter Updated: 11/18/20 1250     Urine Culture >100,000 CFU/mL Normal Urogenital Hue    Narrative:      Specimen contains mixed organisms of questionable pathogenicity which indicates  "contamination with commensal sher.  Further identification is unlikely to provide clinically useful information.  Suggest recollection.    Respiratory Panel PCR w/COVID-19(SARS-CoV-2) VICENTA/AMADA/VERONIKA/PAD/COR/MAD/JOS In-House, NP Swab in UTM/VTM, 3-4 HR TAT - Swab, Nasopharynx [[id number]]  (Normal) Collected: 11/15/20 1315    Lab Status: Final result Specimen: Swab from Nasopharynx Updated: 11/15/20 0392     ADENOVIRUS, PCR Not Detected     Coronavirus 229E Not Detected     Coronavirus HKU1 Not Detected     Coronavirus NL63 Not Detected     Coronavirus OC43 Not Detected     COVID19 Not Detected     Human Metapneumovirus Not Detected     Human Rhinovirus/Enterovirus Not Detected     Influenza A PCR Not Detected     Influenza B PCR Not Detected     Parainfluenza Virus 1 Not Detected     Parainfluenza Virus 2 Not Detected     Parainfluenza Virus 3 Not Detected     Parainfluenza Virus 4 Not Detected     RSV, PCR Not Detected     Bordetella pertussis pcr Not Detected     Bordetella parapertussis PCR Not Detected     Chlamydophila pneumoniae PCR Not Detected     Mycoplasma pneumo by PCR Not Detected    Narrative:      Fact sheet for providers: https://docs.[employer]/wp-content/uploads/CKW4301-7010-VL1.1-EUA-Provider-Fact-Sheet-3.pdf    Fact sheet for patients: https://docs.[employer]/wp-content/uploads/ZGL8653-2200-AK8.1-EUA-Patient-Fact-Sheet-1.pdf          Vitals/Labs/I&O  162.6 cm (64\")  54.4 kg (120 lb)  Body mass index is 20.6 kg/m².   Temp:  [97.4 °F (36.3 °C)-98 °F (36.7 °C)] 97.4 °F (36.3 °C)  Heart Rate:  [] 110  Resp:  [12-28] 28  BP: ()/(38-88) 103/80    Results from last 7 days   Lab Units 11/18/20  0600 11/17/20  0751 11/16/20  0457   WBC 10*3/mm3 15.69* 18.63* 27.76*     Results from last 7 days   Lab Units 11/15/20  1712 11/15/20  1325   LACTATE mmol/L 2.0 3.9*      Results from last 7 days   Lab Units 11/15/20  1311   PROCALCITONIN ng/mL 0.34*                  Estimated Creatinine " "Clearance: 42.5 mL/min (A) (by C-G formula based on SCr of 0.37 mg/dL (L)).  Results from last 7 days   Lab Units 11/19/20  0359 11/18/20  0600 11/17/20  1404   BUN mg/dL 8 15 23   CREATININE mg/dL 0.37* 0.47* 0.62     Intake & Output (last 3 days)       11/16 0701 - 11/17 0700 11/17 0701 - 11/18 0700 11/18 0701 - 11/19 0700 11/19 0701 - 11/20 0700    I.V. (mL/kg) 4794 (89.9) 4092 (76.2) 2880.8 (53)     Other 200 929      NG/ 640      IV Piggyback 181 50      Total Intake(mL/kg) 5370 (100.8) 5711 (106.4) 2880.8 (53)     Urine (mL/kg/hr) 500 (0.4) 3300 (2.6) 3250 (2.5) 950 (7.2)    Stool  0 0     Total Output 500 3300 3250 950    Net +4870 +2411 -369.3 -950            Urine Unmeasured Occurrence   1 x     Stool Unmeasured Occurrence  2 x 2 x           Vancomycin Dosing and Level History:  Inpatient Dosing History (date/time):  11/19 @ 0024 Vancomycin 1000mg IV x1                  Assessment/Plan:    Assessment:  Bayesian analysis of the most recent level(s) using [email or web address] provides the following patient-specific pharmacokinetic parameters:   CL: 3.59 L/h   Vd: 45.4 L   T1/2: 9.15 h  If the predicted trough on this regimen is not within what was previously considered a \"target trough range\", the AUC24 (a stronger predictor of vancomycin efficacy) is predicted to achieve the accepted target of 400-600 mg*h/L. To best balance efficacy and toxicity, we will be targeting AUC24 in this patient rather than steady-state trough levels.     Increasing the regimen to 750 mg (13.8 mg/kg) IV every 12 hours gives a predicted steady-state trough of 12.1 mg/L and AUC24 of 413 mg*h/L.    Recommendations/Plan:  - Note GOC discussions ongoing.  In absence of documented decision to pursue palliative care, will adjust vancomycin to 750mg IV q12h for MRSA bacteremia.  Prediction model estimates subtherapeutic AUC (346 mg/L*hr) on previous regimen of 1250mg IV q24h.  - Obtain vancomycin level 11/20 @ 1100 or sooner if acute " changes  - Continue to monitor SCr      Thank you for involving pharmacy in this patient's care. Please contact pharmacy with any questions or concerns.                           Perri Crisostomo [facility]  Clinical Pharmacist  11/19/20 09:25 EST

## 2020-11-19 NOTE — NURSING NOTE
"Rapid response called for decline in patient condition. Pt had dropped her oxygen saturation to 72%. 100% NRB mask add to the patient. She had increased respiratory effort and appeared in distress. I talked to daughter Linette who stated she was on her way to the hospital. She wanted the nursing staff to \"keep her alive until she gets there\". She agreed to not escalating care and not putting her on ventilator, not performing CPR. Daughter wants feeding tube removed.    "

## 2020-11-19 NOTE — PLAN OF CARE
Problem: Adult Inpatient Plan of Care  Goal: Plan of Care Review  Outcome: Ongoing, Progressing  Flowsheets (Taken 11/19/2020 9395)  Plan of Care Reviewed With: patient  Outcome Summary: patient actively dying, DNR, will continue to give morphine q2 and keep comfortable, 15L non rebreather, IV abx continued, will continue to monitor

## 2020-11-19 NOTE — SIGNIFICANT NOTE
11/19/20 0802   OTHER   Discipline speech language pathologist   Rehab Time/Intention   Session Not Performed other (see comments)  (Change in medical status noted, SLP to sign off.)

## 2020-11-19 NOTE — CONSULTS
Name: Ann Marie Blanton ADMIT: 11/15/2020   : 1933  PCP: Josh Gross MD    MRN: [id number] LOS: 4 days   AGE/SEX: 87 y.o. female  ROOM: E456/1     Chief Complaint   Patient presents with   • Altered Mental Status   consult to assume care out of icu, acute respiratory failure, hypernatremia, anorexia, MRSA bacteremia, advanced dementia, possible transition to palliative care    Subjective   HPI  Ms. Blanton is a 87 y.o. female admitted to the ICU pulmonology with fall, hypotension, and severe electrolyte abnormalities.  Nephrology was consulted.  She required aggressive volume resuscitation and DHT for feeding.  Patient has a history of poor intake and anorexia.  Her electrolytes and acute renal failure began to improve.  She did develop refeeding syndrome and hypophosphatemia.  Despite improvement in her blood work she had continued hypotension and severe encephalopathy.  She had continued signs of pain and distress.  She did have 1 out of 2 cultures positive for MRSA so was given vancomycin.  After goals of care discussion with the family they did not want to further escalate care but did want to continue current treatment so she was transferred to the floor.  She has had worsening respiratory status and is currently having respiratory distress.  She is opening her eyes and using accessory muscles.  She is not speaking.  She did not have peripheral edema.  Daughter was at the bedside and was asking about chest x-ray which has just been performed to monitor her fluid status.  Hospice spoke with her yesterday and she is anticipating following up with them today.  I have also spoken with palliative care here and will consult them to follow.     Past Medical History:   Diagnosis Date   • Anxiety    • Dementia (CMS/HCC)    • Depression    • Eustachian tube dysfunction    • Eye exam normal    • Grief    • Hyperlipidemia    • Hypokalemia    • Mild cognitive impairment    • Sciatica    • Seasonal  allergies      Past Surgical History:   Procedure Laterality Date   • APPENDECTOMY     • EYE SURGERY Left 06/01/2015    Dr. Saldana   • HYSTERECTOMY     • KNEE SURGERY  2010    torn meniscus   • TONSILLECTOMY       Family History   Problem Relation Age of Onset   • Kidney disease Mother    • Nephrolithiasis Mother    • Diabetes Mother    • Heart disease Father    • Diabetes Other    • Breast cancer Sister    • Thyroid disease Brother      Social History     Tobacco Use   • Smoking status: Never Smoker   • Smokeless tobacco: Never Used   Substance Use Topics   • Alcohol use: No   • Drug use: No     Medications Prior to Admission   Medication Sig Dispense Refill Last Dose   • clonazePAM (KlonoPIN) 0.5 MG tablet Take 0.5 mg by mouth 2 (Two) Times a Day As Needed.      • doxepin (SINEquan) 25 MG capsule Take 25 mg by mouth Every Night.      • megestrol (MEGACE) 40 MG/ML suspension Take  by mouth Daily.      • potassium chloride (MICRO-K) 10 MEQ CR capsule Take 10 mEq by mouth Daily.      • acetaminophen (TYLENOL) 325 MG tablet Take 2 tablets by mouth Every 4 (Four) Hours As Needed for Mild Pain .      • aspirin 81 MG EC tablet Take 81 mg by mouth Daily.      • vitamin B-12 (CYANOCOBALAMIN) 1000 MCG tablet Take 1 tablet by mouth Daily. 90 tablet 0      Allergies:    Allergies   Allergen Reactions   • Sulfa Antibiotics Other (See Comments)       Review of Systems   Unable to perform ROS: Severe respiratory distress        Objective    Vital Signs  Temp:  [97.4 °F (36.3 °C)-98 °F (36.7 °C)] 97.4 °F (36.3 °C)  Heart Rate:  [] 110  Resp:  [12-28] 28  BP: ()/(38-80) 103/80  SpO2:  [82 %-100 %] 83 %  on  Flow (L/min):  [2-15] 15;   Device (Oxygen Therapy): nonrebreather mask  Body mass index is 20.6 kg/m².    Physical Exam  Vitals signs and nursing note reviewed.   Constitutional:       General: She is in acute distress.      Appearance: She is ill-appearing.   HENT:      Head: Atraumatic.   Eyes:      General: No  scleral icterus.     Conjunctiva/sclera: Conjunctivae normal.   Neck:      Musculoskeletal: Neck supple. No muscular tenderness.   Cardiovascular:      Rate and Rhythm: Tachycardia present.   Pulmonary:      Effort: Respiratory distress present.      Breath sounds: Decreased breath sounds and rales present. No wheezing.   Abdominal:      General: There is no distension.      Palpations: Abdomen is soft.      Tenderness: There is no abdominal tenderness.   Musculoskeletal:      Right lower leg: No edema.      Left lower leg: No edema.   Skin:     General: Skin is warm and dry.   Neurological:      Mental Status: She is disoriented.   Psychiatric:         Cognition and Memory: Cognition is impaired. Memory is impaired.         Results Review:  I reviewed the patient's new clinical results.  I reviewed imaging, agree with interpretation.  I reviewed EKG/telemetry, sinus tachycardia.  I reviewed prior records.    Lab Results (last 24 hours)     Procedure Component Value Units Date/Time    POC Glucose Once [[id number]]  (Normal) Collected: 11/18/20 1139    Specimen: Blood Updated: 11/18/20 1146     Glucose 90 mg/dL     POC Glucose Once [[id number]]  (Abnormal) Collected: 11/18/20 1523    Specimen: Blood Updated: 11/18/20 1525     Glucose 56 mg/dL     Phosphorus [[id number]]  (Abnormal) Collected: 11/18/20 1526    Specimen: Blood Updated: 11/18/20 1637     Phosphorus 2.2 mg/dL     POC Glucose Once [[id number]]  (Normal) Collected: 11/18/20 1547    Specimen: Blood Updated: 11/18/20 1553     Glucose 75 mg/dL     POC Glucose Once [[id number]]  (Abnormal) Collected: 11/18/20 1549    Specimen: Blood Updated: 11/18/20 1553     Glucose 69 mg/dL     POC Glucose Once [[id number]]  (Abnormal) Collected: 11/18/20 1552    Specimen: Blood Updated: 11/18/20 1554     Glucose 145 mg/dL     POC Glucose Once [[id number]]  (Abnormal) Collected: 11/18/20 1559    Specimen: Blood Updated: 11/18/20 1600     Glucose 208 mg/dL     Magnesium [[id number]]   (Abnormal) Collected: 11/19/20 0359    Specimen: Blood Updated: 11/19/20 0527     Magnesium 1.5 mg/dL     Protime-INR [[id number]]  (Abnormal) Collected: 11/19/20 0359    Specimen: Blood Updated: 11/19/20 0504     Protime 15.9 Seconds      INR 1.29    Renal Function Panel [[id number]]  (Abnormal) Collected: 11/19/20 0359    Specimen: Blood Updated: 11/19/20 0529     Glucose 129 mg/dL      BUN 8 mg/dL      Creatinine 0.37 mg/dL      Sodium 146 mmol/L      Potassium 3.2 mmol/L      Chloride 115 mmol/L      CO2 24.7 mmol/L      Calcium 6.5 mg/dL      Albumin 2.50 g/dL      Phosphorus 2.6 mg/dL      Anion Gap 6.3 mmol/L      BUN/Creatinine Ratio 21.6     eGFR Non African Amer >150 mL/min/1.73     Narrative:      GFR Normal >60  Chronic Kidney Disease <60  Kidney Failure <15      Blood Gas, Arterial - [[id number]]  (Abnormal) Collected: 11/19/20 0745    Specimen: Arterial Blood Updated: 11/19/20 0750     Site Arterial: right radial     Hector's Test Positive     pH, Arterial 7.280 pH units      Comment: Critical:Notify RUSLAN EASTON (19-Nov-20 07:48:19)Read back ok        pCO2, Arterial 57.2 mm Hg      Comment: Critical:Notify RUSLAN EASTON (19-Nov-20 07:48:19)Read back ok        pO2, Arterial 67.2 mm Hg      HCO3, Arterial 26.9 mmol/L      Base Excess, Arterial -0.5 mmol/L      O2 Saturation Calculated 89.9 %      Barometric Pressure for Blood Gas 756.7 mmHg      Modality NRB     Rate 26 Breaths/minute     CBC & Differential [[id number]] Collected: 11/19/20 0949    Specimen: Blood from Hand, Right Updated: 11/19/20 0959    Narrative:      The following orders were created for panel order CBC & Differential.  Procedure                               Abnormality         Status                     ---------                               -----------         ------                     CBC Auto Differential[[id number]]                            In process                   Please view results for these tests on the individual  orders.    CBC Auto Differential [[id number]] Collected: 11/19/20 0949    Specimen: Blood from Hand, Right Updated: 11/19/20 0959          XR Chest 1 View   Final Result      CT Head Without Contrast   Final Result      CT Abdomen Pelvis Without Contrast   Final Result      CT Chest Without Contrast   Final Result      XR Chest 1 View   Final Result      XR Hip With or Without Pelvis 2 - 3 View Left   Final Result        Assessment/Plan      Active Hospital Problems    Diagnosis  POA   • MRSA bacteremia [R78.81, B95.62]  Yes   • Acute respiratory failure with hypercapnia (CMS/[facility]) [J96.02]  Yes   • SIMA (acute kidney injury) (CMS/[facility]) [N17.9]  Yes   • Hypernatremia [E87.0]  Yes   • Hypokalemia [E87.6]  Yes   • Anorexia nervosa [F50.00]  Yes   • Hypophosphatemia [E83.39]  Yes   • Severe malnutrition (CMS/[facility]) [E43]  Yes   • Altered mental status [R41.82]  Yes   • DNR (do not resuscitate) [Z66]  Yes   • Dementia (CMS/[facility]) [F03.90]  Yes      Resolved Hospital Problems   No resolved problems to display.     · Having acute respiratory failure now.  Discussed chest x-ray results with Dr. Schmidt.  He is going to discuss with the family again.  Given her rapid decline now he is said he would remain as the primary attending so there can be continuity of care for family.  I did talk with our palliative care nurse here and consult placed for them to follow and aid with likely transition to palliative care.  Electrolyte replacement protocols are entered.  Her renal failure has resolved.  Nephrology is following.  I am happy to take over as attending if pulmonology would like should she stabilize.  Will follow peripherally for now.      I discussed the patients findings and my recommendations with patient, family, nursing staff and consulting provider.      Hai Connors MD  [facility]  11/19/20  11:04 EST    Dictated portions using Dragon dictation software.  During the entire encounter, I was  wearing recommended PPE including face mask and eye protection. Hand sanitization was performed prior to entering room and upon exit.

## 2020-11-19 NOTE — PROGRESS NOTES
Tutu Schmidt MD                          [phone]      Patient ID:    Name:  Ann Marie Blanton    MRN:  [id number]    1933   87 y.o.  female            Patient Care Team:  Josh Gross MD as PCP - General (Family Medicine)    CC/ Reason for visit: Acute metabolic encephalopathy, severe hypernatremia, dehydration, acute renal failure    Subjective: Pt seen and examined this AM.  Patient had further decline overnight and rapid response was called this morning.  Please see events and description mentioned below.    ROS: Unable to obtain due to severe encephalopathy    Objective     Vital Signs past 24hrs    BP range: BP: ()/(38-80) 110/53  Pulse range: Heart Rate:  [] 120  Resp rate range: Resp:  [16-32] 32  Temp range: Temp (24hrs), Av.8 °F (36.6 °C), Min:97.4 °F (36.3 °C), Max:98 °F (36.7 °C)      Ventilator/Non-Invasive Ventilation Settings (From admission, onward)    None          Device (Oxygen Therapy): nonrebreather mask       54.4 kg (120 lb); Body mass index is 20.6 kg/m².      Intake/Output Summary (Last 24 hours) at 2020 1631  Last data filed at 2020 0819  Gross per 24 hour   Intake 2880.75 ml   Output 2000 ml   Net 880.75 ml       PHYSICAL EXAM   Constitutional:  Elderly cachectic white female pt in bed, appears in respiratory distress and is having obvious accessory muscle use  Head: - NCAT  Eyes: No pallor, Anicteric conjunctiva, EOMI.  ENMT:  Mallampati 2, no oral thrush.   Diffuse upper airway sounds Dry MM.   NECK: Trachea midline, No thyromegaly, no palpable cervical LNpathy  Heart: RRR, no murmur. No pedal edema   Lungs: LUDIVINA +, tachypnea, minimal breath sounds at the left base.  Bilateral rhonchi.  No wheezes/ crackles heard    Abdomen: Soft.  Scaphoid no tenderness, guarding or rigidity. No palpable masses  Extremities: Extremities warm and well perfused. No cyanosis/ clubbing  Neuro: Conscious, not responding to any  stimuli, no gross focal neuro deficits  Psych: Mood and affect -anxious and uncomfortable    PPE recommended per [facility] [place] operated by [facility] infectious disease Isolation protocol for the current clinical scenario(as mentioned below) was followed.     Scheduled meds:  aspirin, 81 mg, Oral, Daily  doxepin, 25 mg, Oral, Nightly  midodrine, 20 mg, Oral, TID AC  sodium chloride, 10 mL, Intravenous, Q12H  vancomycin, 750 mg, Intravenous, Q12H        IV meds:                      Pharmacy to dose vancomycin,         Data Review:      Results from last 7 days   Lab Units 11/19/20  0949 11/19/20  0359 11/18/20  0600 11/17/20  1404 11/17/20  0751  11/15/20  1311   SODIUM mmol/L  --  146* 146* 150* 154*   < > 172*   POTASSIUM mmol/L  --  3.2* 3.8 3.6 3.0*   < > 4.4   CHLORIDE mmol/L  --  115* 116* 121* 126*   < > 128*   CO2 mmol/L  --  24.7 20.1* 22.6 21.2*   < > 25.0   BUN mg/dL  --  8 15 23 29*   < > 81*   CREATININE mg/dL  --  0.37* 0.47* 0.62 0.60   < > 2.50*   CALCIUM mg/dL  --  6.5* 7.2* 7.5* 7.3*   < > 9.2   BILIRUBIN mg/dL  --   --  0.3  --  0.3  --  0.4   ALK PHOS U/L  --   --  68  --  56  --  63   ALT (SGPT) U/L  --   --  24  --  12  --  13   AST (SGOT) U/L  --   --  41*  --  16  --  11   GLUCOSE mg/dL  --  129* 123* 146* 199*   < > 192*   WBC 10*3/mm3 17.28*  --  15.69*  --  18.63*   < > 33.98*   HEMOGLOBIN g/dL 10.7*  --  11.5*  --  10.3*   < > 14.5   PLATELETS 10*3/mm3 141  --  119*  --  149   < > 337   INR   --  1.29* 1.37*  --  1.52*   < >  --    PROCALCITONIN ng/mL  --   --   --   --   --   --  0.34*    < > = values in this interval not displayed.       Lab Results   Component Value Date    CALCIUM 6.5 (L) 11/19/2020    PHOS 2.6 11/19/2020       Results from last 7 days   Lab Units 11/15/20  1451 11/15/20  1436 11/15/20  1347   BLOODCX  No growth at 4 days Staphylococcus aureus, MRSA*  --    URINECX   --   --  >100,000 CFU/mL Normal Urogenital Hue   BCIDPCR   --  Staphylococcus aureus. mecA (methicillin resistance gene)  detected. Identification by BCID PCR.*  --        Results from last 7 days   Lab Units 11/19/20  0745 11/15/20  1327   PH, ARTERIAL pH units 7.280* 7.401   PO2 ART mm Hg 67.2* 91.0   PCO2, ARTERIAL mm Hg 57.2* 36.5   HCO3 ART mmol/L 26.9 22.6        Results Review:    I have reviewed the relevant laboratory results and independently reviewed the chest imaging from this hospitalization including the available echocardiogram reports personally and summarized it if/ when appropriate below    Assessment    Acute metabolic encephalopathy; worsening  MRSA bacteremia  Left lower lobe collapse/pneumonia  Recent fall without any injury  Urinary tract infection   Sepsis with hypotension  Severe dehydration  Severe life-threatening hypernatremia  Acute renal failure  Refeeding syndrome  Severe dysphagia s/p DHT  Hypertension  H/o anorexia  Severe PCM  Advanced dementia  DNR    PLAN:  Patient continued to decline overnight as well as this morning with worsening respiratory failure and rapid response was called as the family was notified about the events and the daughter reportedly asked her to get full treatment as an resuscitation.  I spoke with charge nurse several times about her care and mention to her about the discussion overnight and the plan is to make her DNR/DNI with no escalation of care and we have agreed on this after a long discussion with the daughter.    I have called the daughter and spoke to her and reminded her about the prior discussion and she agrees and states that she only wants her to be alive till she comes to see her in the hospital.  I have discussed with her that we will try to keep her as stable as possible. Several discussions with the daughter with regards to her request to give her mother some Lasix and get a chest x-ray.  Patient is already hypotensive and I do not think congestive heart failure is a current issue. I agreed to get a chest x-ray which obviously showed left dense atelectasis/ pna  with likely mucous plugging from aspiration given severe encephalopathy and obvious aspiration concerns.  Patient is clearly uncomfortable but the daughter is limiting pain medications. I have convinced her several times but she does not want to give her anything that would alleviate her pain at this point and actually does not believe that she is in pain even though we obviously see her uncomfortable.    I had a discussion with the patient's brother and explained to her about the difficulty her sister is facing with regards to accepting the inevitable and mention to him that she is limiting pain medication and that is not appropriate and he spoke to her and they agreed to let her get low-dose pain medication but would not want to give her any other sedative medications including Ativan.    Palliative care nurse as been evaluated and have discussed with them as well and they understand the situation and are around to help but would like to continue with the current plan.    I have made a final recommendation to continue with the current treatment plan with no escalation.  Family requested to see if he can place a Dobbhoff tube back which I do not think is appropriate and daughter is agreeing to not put it back.    Discussed with Dr. Connors as well and the plan is to continue to keep the patient under our service to give a consistent message to the patient's family and avoid any confusion.    CC- 80 mins - Appreciate Tiera MERCER attention to the pt    Tutu Schmidt MD  11/19/2020

## 2020-11-19 NOTE — PROGRESS NOTES
[facility]  Clinical Pharmacy Department     Vancomycin Pharmacokinetic Note    Ann Marie Blanton is a 87 y.o. female who is on day 1 of pharmacy to dose vancomycin for  Bacteremia, MRSA in blood culture .    Target level: AUC24 400-600  Consulting Provider:  Lynne Weston  Current Vancomycin Dose:   1,250 mg (23.3 mg/kg) IV every  24  hours  Planned Duration of Therapy: 5 days  Other Antimicrobials: ceftriaxone    Allergies  Allergies as of 11/15/2020 - Reviewed 11/15/2020   Allergen Reaction Noted    Sulfa antibiotics Other (See Comments) 10/28/2015       Microbiology:  Microbiology Results (last 10 days)       Procedure Component Value - Date/Time    Blood Culture - Blood, Arm, Left [[id number]] Collected: 11/15/20 1451    Lab Status: Preliminary result Specimen: Blood from Arm, Left Updated: 11/18/20 1530     Blood Culture No growth at 3 days    Blood Culture - Blood, Hand, Left [[id number]] Collected: 11/15/20 1436    Lab Status: Preliminary result Specimen: Blood from Hand, Left Updated: 11/18/20 1445     Blood Culture No growth at 3 days    Blood Culture ID, PCR - Blood, Hand, Left [[id number]]  (Abnormal) Collected: 11/15/20 1436    Lab Status: Final result Specimen: Blood from Hand, Left Updated: 11/18/20 2213     BCID, PCR Staphylococcus aureus. mecA (methicillin resistance gene) detected. Identification by BCID PCR.     BOTTLE TYPE Aerobic Bottle    Urine Culture - Urine, Urine, Catheter [[id number]] Collected: 11/15/20 1347    Lab Status: Final result Specimen: Urine, Catheter Updated: 11/18/20 1250     Urine Culture >100,000 CFU/mL Normal Urogenital Sher    Narrative:      Specimen contains mixed organisms of questionable pathogenicity which indicates contamination with commensal sher.  Further identification is unlikely to provide clinically useful information.  Suggest recollection.    Respiratory Panel PCR w/COVID-19(SARS-CoV-2) VICENTA/AMADA/VERONIKA/PAD/COR/MAD/JOS In-House, NP Swab in UTM/VTM, 3-4 HR TAT -  "Swab, Nasopharynx [[id number]]  (Normal) Collected: 11/15/20 1315    Lab Status: Final result Specimen: Swab from Nasopharynx Updated: 11/15/20 5053     ADENOVIRUS, PCR Not Detected     Coronavirus 229E Not Detected     Coronavirus HKU1 Not Detected     Coronavirus NL63 Not Detected     Coronavirus OC43 Not Detected     COVID19 Not Detected     Human Metapneumovirus Not Detected     Human Rhinovirus/Enterovirus Not Detected     Influenza A PCR Not Detected     Influenza B PCR Not Detected     Parainfluenza Virus 1 Not Detected     Parainfluenza Virus 2 Not Detected     Parainfluenza Virus 3 Not Detected     Parainfluenza Virus 4 Not Detected     RSV, PCR Not Detected     Bordetella pertussis pcr Not Detected     Bordetella parapertussis PCR Not Detected     Chlamydophila pneumoniae PCR Not Detected     Mycoplasma pneumo by PCR Not Detected    Narrative:      Fact sheet for providers: https://docs.[employer]/wp-content/uploads/VBY4236-3222-CV3.1-EUA-Provider-Fact-Sheet-3.pdf    Fact sheet for patients: https://docs.[employer]/wp-content/uploads/RIN3336-4891-JO9.1-EUA-Patient-Fact-Sheet-1.pdf                Vitals/Labs/I&O  162.6 cm (64\")  53.7 kg (118 lb 6.2 oz)  Body mass index is 20.32 kg/m².   [unfilled]    Results from last 7 days   Lab Units 11/18/20  0600 11/17/20  0751 11/16/20  0457   WBC 10*3/mm3 15.69* 18.63* 27.76*     Results from last 7 days   Lab Units 11/15/20  1712 11/15/20  1325   LACTATE mmol/L 2.0 3.9*      Results from last 7 days   Lab Units 11/15/20  1311   PROCALCITONIN ng/mL 0.34*                  Estimated Creatinine Clearance: 42 mL/min (A) (by C-G formula based on SCr of 0.47 mg/dL (L)).  Results from last 7 days   Lab Units 11/18/20  0600 11/17/20  1404 11/17/20  0751   BUN mg/dL 15 23 29*   CREATININE mg/dL 0.47* 0.62 0.60     Intake & Output (last 3 days)         11/16 0701 - 11/17 0700 11/17 0701 - 11/18 0700 11/18 0701 - 11/19 0700    I.V. (mL/kg) 4794 (89.9) 4092 (76.2) 2880.8 " "(53.6)    Other 200 929     NG/ 640     IV Piggyback 181 50     Total Intake(mL/kg) 5370 (100.8) 5711 (106.4) 2880.8 (53.6)    Urine (mL/kg/hr) 500 (0.4) 3300 (2.6) 2900 (2.3)    Stool  0 0    Total Output 500 3300 2900    Net +4870 +2411 -19.3           Stool Unmeasured Occurrence  2 x 1 x            Vancomycin Dosing and Level History:  Last Dose Received in the ED/Outside Facility: 1,000 mg  Is Patient on Dialysis or Renal Replacement: no  Inpatient Dosing History (date/time): 11-19-20 @ 00:24                  Assessment/Plan:    Assessment:  Bayesian analysis of the most recent level(s) using [employer] provides the following patient-specific pharmacokinetic parameters:   CL: 2.94 L/h   Vd: 44.8 L   T1/2: 11 h  If the predicted trough on this regimen is not within what was previously considered a \"target trough range\", the AUC24 (a stronger predictor of vancomycin efficacy) is predicted to achieve the accepted target of 400-600 mg*h/L. To best balance efficacy and toxicity, we will be targeting AUC24 in this patient rather than steady-state trough levels.     Initiating the regimen of 1,250 mg (23.3mg/kg) IV every 24 hours gives a predicted steady-state trough of 10.6 mg/L and AUC24 of 422 mg*h/L based upon population pharmacokinetics and this patient's specific parameters.    Recommendations/Plan:  -Initiate vancomycin 1,250 mg (23.3 mg/kg) IV every 24 hours   -Obtain vancomycin level on 11-20-20 prior to the 3rd dose or sooner if acute changes   -Continue to monitor SCr    Thank you for involving pharmacy in this patient's care. Please contact pharmacy with any questions or concerns.                           Kash Lopez, [facility]  Clinical Pharmacist  11/19/20 03:20 EST    "

## 2020-11-19 NOTE — CONSULTS
Received hospice consult. Long discussion with daughter. Explained services HSB vs home with hospice. Patient eligible for HSB if bed becomes available which daughter is agreeable. Liaison will continue to follow for possible admission. Thank you for the referral.    Letty Lo RN  [facility]  [phone]

## 2020-11-19 NOTE — PLAN OF CARE
Goal Outcome Evaluation:   Patient resting abed with coretrak in place secured at 85cm to her center of nose. She is receiving her tube feeding and tolerating well at 40cc/hr without evidence of residual. V/S have been WNL this shift and NSR heart rhythm. She was started on an additional antibiotic tonight related to abnormal blood culture results, and did tolerate without any adverse side effects. Lungs are with coarse rhales in bilateral upper lobes and diminished in the bases. Patient arouses to her name/voice then asleep during the shift.

## 2020-11-19 NOTE — NURSING NOTE
Daughter called at beginning of shift to inform her patient was declining and requiring more oxygen demands, RR called as daughter stated she still wanted everything but CPR. RR team informed daughter we were not escalating care and that we would try to keep patient alive until she could get here. Dr TREJO aware and at bedside during this time.

## 2020-11-19 NOTE — PROGRESS NOTES
"   LOS: 4 days    Patient Care Team:  Josh Gross MD as PCP - General (Family Medicine)    Chief Complaint:    Chief Complaint   Patient presents with   • Altered Mental Status     Follow UP SIMA, hypernatremia  Subjective     Interval History:   Looks much worse.  Hypotensive yesterday despite multiple fluid boluses. Looks terminal.  Non -rebreather mask in place.  Eyes open,  Does not turn her head to voice or appear aware.  Previous notes from today reviewed.  Daughter here earlier, but left to eat and has not yet returned.  Patient did receive morphine IV and looks much more comfortable per RUSLAN Mott.  IVF off now.     Review of Systems:   Unable to obtain    Objective     Vital Signs  Temp:  [97.4 °F (36.3 °C)-98 °F (36.7 °C)] 98 °F (36.7 °C)  Heart Rate:  [] 120  Resp:  [12-32] 32  BP: ()/(38-80) 110/53    Flowsheet Rows      First Filed Value   Admission Height  162.6 cm (64\") Documented at 11/15/2020 1317   Admission Weight  49.9 kg (110 lb) Documented at 11/15/2020 1317          I/O this shift:  In: -   Out: 950 [Urine:950]  I/O last 3 completed shifts:  In: 5473.8 [I.V.:4702.8; Other:429; NG/GT:342]  Out: 5050 [Urine:5050]    Intake/Output Summary (Last 24 hours) at 11/19/2020 1532  Last data filed at 11/19/2020 0819  Gross per 24 hour   Intake 2880.75 ml   Output 3900 ml   Net -1019.25 ml       Physical Exam:  Elderly, cachectic.  No scleral icterus. Does not turn her head  to voice.  Not responsive .  Oral mucosa dry. Mouth breathing. . NRB mask.   Neck no jvd  Heart RRR, tachy.  no s3 or rub  Lungs upper airway rhonchi.  no wheezing  Abd scaphoid.  + bs, soft, no grimace to palpation  Ext no edema. No muscle mass  Neuro not verbal.        Results Review:    Results from last 7 days   Lab Units 11/19/20  0359 11/18/20  0600 11/17/20  1404 11/17/20  0751  11/15/20  1311   SODIUM mmol/L 146* 146* 150* 154*   < > 172*   POTASSIUM mmol/L 3.2* 3.8 3.6 3.0*   < > 4.4   CHLORIDE mmol/L 115* " 116* 121* 126*   < > 128*   CO2 mmol/L 24.7 20.1* 22.6 21.2*   < > 25.0   BUN mg/dL 8 15 23 29*   < > 81*   CREATININE mg/dL 0.37* 0.47* 0.62 0.60   < > 2.50*   CALCIUM mg/dL 6.5* 7.2* 7.5* 7.3*   < > 9.2   BILIRUBIN mg/dL  --  0.3  --  0.3  --  0.4   ALK PHOS U/L  --  68  --  56  --  63   ALT (SGPT) U/L  --  24  --  12  --  13   AST (SGOT) U/L  --  41*  --  16  --  11   GLUCOSE mg/dL 129* 123* 146* 199*   < > 192*    < > = values in this interval not displayed.       Estimated Creatinine Clearance: 42.5 mL/min (A) (by C-G formula based on SCr of 0.37 mg/dL (L)).    Results from last 7 days   Lab Units 11/19/20 0359 11/18/20  1526 11/18/20 0600 11/17/20  0751   MAGNESIUM mg/dL 1.5*  --  1.7  --  1.7   PHOSPHORUS mg/dL 2.6 2.2* 1.3*   < > 0.9*    < > = values in this interval not displayed.       Results from last 7 days   Lab Units 11/17/20  0751   URIC ACID mg/dL 6.8*       Results from last 7 days   Lab Units 11/19/20  0949 11/18/20  0600 11/17/20  0751 11/16/20  0457 11/15/20  1311   WBC 10*3/mm3 17.28* 15.69* 18.63* 27.76* 33.98*   HEMOGLOBIN g/dL 10.7* 11.5* 10.3* 12.9 14.5   PLATELETS 10*3/mm3 141 119* 149 213 337       Results from last 7 days   Lab Units 11/19/20  0359 11/18/20  0600 11/17/20  0751 11/16/20  0457   INR  1.29* 1.37* 1.52* 1.52*         Imaging Results (Last 24 Hours)     Procedure Component Value Units Date/Time    XR Chest 1 View [[id number]] Collected: 11/19/20 1027     Updated: 11/19/20 1033    Narrative:      XR CHEST 1 VW-     Clinical: Respiratory failure     COMPARISON 11/15/2020 chest radiograph and chest CT     FINDINGS: Within the interim there has been development of opacity  involving the upper, mid and lower left lung zone with volume loss,  there is shift of the heart and mediastinum into the left hemithorax.  Opacity could represent collapse, consolidation and/or effusion. Low  lung volumes. No interval change in appearance of the right lung.  Visualized cardiomediastinal  silhouette is satisfactory in appearance.  The remainder of examination is unremarkable.     This report was finalized on 11/19/2020 10:30 AM by Dr. Vasyl Zhang M.D.           aspirin, 81 mg, Oral, Daily  doxepin, 25 mg, Oral, Nightly  megestrol, 400 mg, Oral, Daily  midodrine, 20 mg, Oral, TID AC  sodium chloride, 10 mL, Intravenous, Q12H  vancomycin, 750 mg, Intravenous, Q12H  vitamin B-12, 1,000 mcg, Oral, Daily      Pharmacy to dose vancomycin,         Medication Review:   Current Facility-Administered Medications   Medication Dose Route Frequency Provider Last Rate Last Admin   • aluminum-magnesium hydroxide-simethicone (MAALOX MAX) 400-400-40 MG/5ML suspension 15 mL  15 mL Oral Q6H PRN Tutu Schmidt MD       • aspirin EC tablet 81 mg  81 mg Oral Daily Tutu Schmidt MD   81 mg at 11/18/20 1057   • doxepin (SINEquan) capsule 25 mg  25 mg Oral Nightly Tutu Schmidt MD   25 mg at 11/18/20 2102   • ipratropium-albuterol (DUO-NEB) nebulizer solution 3 mL  3 mL Nebulization Q6H PRN Tutu Schmidt MD       • labetalol (NORMODYNE,TRANDATE) injection 20 mg  20 mg Intravenous Q10 Min PRN Tutu Schmidt MD       • magnesium hydroxide (MILK OF MAGNESIA) suspension 2400 mg/10mL 10 mL  10 mL Oral Daily PRN Tutu Schmidt MD   10 mL at 11/17/20 1229   • megestrol (MEGACE) 40 MG/ML suspension 400 mg  400 mg Oral Daily Tutu Schmidt MD   400 mg at 11/18/20 1056   • midodrine (PROAMATINE) tablet 20 mg  20 mg Oral TID AC Tutu Schmidt MD   20 mg at 11/19/20 0705   • morphine injection 1 mg  1 mg Intravenous Q2H PRN Tutu Schmidt MD   1 mg at 11/19/20 1420   • ondansetron (ZOFRAN) injection 4 mg  4 mg Intravenous Q6H PRN Tutu Schmidt MD       • Pharmacy to dose vancomycin   Does not apply Continuous PRN Lynne Weston, APRN       • potassium chloride (K-DUR,KLOR-CON) ER tablet 40 mEq  40 mEq Oral PRN Waylon  Hai ANN MD        Or   • potassium chloride (KLOR-CON) packet 40 mEq  40 mEq Oral PRN Hai Connors MD        Or   • potassium chloride 10 mEq in 100 mL IVPB  10 mEq Intravenous Q1H PRN Hai Connors MD       • potassium phosphate 45 mmol in sodium chloride 0.9 % 500 mL infusion  45 mmol Intravenous PRN Hai Connors MD        Or   • potassium phosphate 30 mmol in sodium chloride 0.9 % 250 mL infusion  30 mmol Intravenous PRN Hai Connors MD        Or   • potassium phosphate 15 mmol in sodium chloride 0.9 % 100 mL infusion  15 mmol Intravenous PRN Hai Connors MD        Or   • sodium phosphates 40 mmol in sodium chloride 0.9 % 500 mL IVPB  40 mmol Intravenous PRN Hai Connors MD        Or   • sodium phosphates 20 mmol in sodium chloride 0.9 % 250 mL IVPB  20 mmol Intravenous PRN Hai Connors MD       • sennosides-docusate (PERICOLACE) 8.6-50 MG per tablet 2 tablet  2 tablet Oral Nightly PRN Tutu Schmidt MD       • sodium chloride 0.9 % flush 10 mL  10 mL Intravenous PRN Tutu Schmidt MD       • sodium chloride 0.9 % flush 10 mL  10 mL Intravenous PRN Tutu Schmidt MD       • sodium chloride 0.9 % flush 10 mL  10 mL Intravenous Q12H Tutu Schmidt MD   10 mL at 11/18/20 2109   • sodium chloride 0.9 % flush 10 mL  10 mL Intravenous PRN Tutu Schmidt MD   10 mL at 11/15/20 1749   • vancomycin 750 mg/250 mL 0.9% NS add-vantage  750 mg Intravenous Q12H Hai Connors MD   750 mg at 11/19/20 1211   • vitamin B-12 (CYANOCOBALAMIN) tablet 1,000 mcg  1,000 mcg Oral Daily Tutu Schmidt MD   1,000 mcg at 11/18/20 1057       Assessment/Plan   1. Nilson Resolved.  2.  Hypernatremia.  Sodium improved. . Now off IVF.   3. Hypophosphatemia likely due to refeeding.  Suspect she was not eating or drinking at all.   4. MRSA bacteremia . Leukocytosis. Now on IV vanc .  5. PCM.  Tube feed with likely refeeding  hypophosphatemia.   6. Acute metabolic encephalopathy superimposed on Dementia  7. Hypoxic respiratory failure with possible collapse of left lung versus consolidative PNA.     She is dying.  Would not resume IVF or replace K as this will not make a difference in her survival.     Mary Silva MD  11/19/20  15:32 EST

## 2020-11-20 NOTE — PROGRESS NOTES
Tutu Schmidt MD                          [phone]      Patient ID:    Name:  Ann Marie Blanton    MRN:  [id number]    1933   87 y.o.  female            Patient Care Team:  Josh Gross MD as PCP - General (Family Medicine)    CC/ Reason for visit: Acute metabolic encephalopathy, severe hypernatremia, dehydration, acute renal failure    Subjective: Pt seen and examined this AM.  Patient remains barely awake and responding only to painful stimuli.  Still requiring 100% nonrebreather to keep sats in the mid 90s.  Appears fairly uncomfortable on the 1 mg morphine push approved by the daughter and have increased to 2 mg.  Asked the nurse to contact me if she has any questions about it.    ROS: Unable to obtain due to severe encephalopathy    Objective     Vital Signs past 24hrs    BP range: BP: ()/(53-88) 146/70  Pulse range: Heart Rate:  [105-120] 118  Resp rate range: Resp:  [24-32] 24  Temp range: Temp (24hrs), Av.6 °F (36.4 °C), Min:97.3 °F (36.3 °C), Max:98 °F (36.7 °C)      Ventilator/Non-Invasive Ventilation Settings (From admission, onward)    None          Device (Oxygen Therapy): nonrebreather mask       54.4 kg (120 lb); Body mass index is 20.6 kg/m².      Intake/Output Summary (Last 24 hours) at 2020 1334  Last data filed at 2020 1240  Gross per 24 hour   Intake 0 ml   Output 850 ml   Net -850 ml       PHYSICAL EXAM   Constitutional:  Elderly cachectic white female pt in bed, appears in respiratory distress and is having obvious accessory muscle use  Head: - NCAT  Eyes: No pallor, Anicteric conjunctiva, EOMI.  ENMT:  Mallampati 2, no oral thrush.   Diffuse upper airway sounds Dry MM.   NECK: Trachea midline, No thyromegaly, no palpable cervical LNpathy  Heart: RRR, no murmur. No pedal edema   Lungs: LUDIVINA +, tachypnea, minimal breath sounds at the left base.  Bilateral rhonchi.  No wheezes/ crackles heard    Abdomen: Soft.  Scaphoid no  tenderness, guarding or rigidity. No palpable masses  Extremities: Extremities warm and well perfused. No cyanosis/ clubbing  Neuro: Conscious, not responding to any stimuli, no gross focal neuro deficits  Psych: Mood and affect -anxious and uncomfortable    PPE recommended per [facility] infectious disease Isolation protocol for the current clinical scenario(as mentioned below) was followed.     Scheduled meds:  aspirin, 81 mg, Oral, Daily  doxepin, 25 mg, Oral, Nightly  midodrine, 20 mg, Oral, TID AC  sodium chloride, 10 mL, Intravenous, Q12H  vancomycin, 750 mg, Intravenous, Q12H        IV meds:                      Pharmacy to dose vancomycin,         Data Review:      Results from last 7 days   Lab Units 11/20/20  0509 11/19/20  0949 11/19/20  0359 11/18/20  0600 11/17/20  1404 11/17/20  0751  11/15/20  1311   SODIUM mmol/L  --   --  146* 146* 150* 154*   < > 172*   POTASSIUM mmol/L  --   --  3.2* 3.8 3.6 3.0*   < > 4.4   CHLORIDE mmol/L  --   --  115* 116* 121* 126*   < > 128*   CO2 mmol/L  --   --  24.7 20.1* 22.6 21.2*   < > 25.0   BUN mg/dL  --   --  8 15 23 29*   < > 81*   CREATININE mg/dL  --   --  0.37* 0.47* 0.62 0.60   < > 2.50*   CALCIUM mg/dL  --   --  6.5* 7.2* 7.5* 7.3*   < > 9.2   BILIRUBIN mg/dL  --   --   --  0.3  --  0.3  --  0.4   ALK PHOS U/L  --   --   --  68  --  56  --  63   ALT (SGPT) U/L  --   --   --  24  --  12  --  13   AST (SGOT) U/L  --   --   --  41*  --  16  --  11   GLUCOSE mg/dL  --   --  129* 123* 146* 199*   < > 192*   WBC 10*3/mm3  --  17.28*  --  15.69*  --  18.63*   < > 33.98*   HEMOGLOBIN g/dL  --  10.7*  --  11.5*  --  10.3*   < > 14.5   PLATELETS 10*3/mm3  --  141  --  119*  --  149   < > 337   INR  1.42*  --  1.29* 1.37*  --  1.52*   < >  --    PROCALCITONIN ng/mL  --   --   --   --   --   --   --  0.34*    < > = values in this interval not displayed.       Lab Results   Component Value Date    CALCIUM 6.5 (L) 11/19/2020    PHOS 2.6 11/19/2020       Results from  last 7 days   Lab Units 11/15/20  1451 11/15/20  1436 11/15/20  1347   BLOODCX  No growth at 4 days Staphylococcus aureus, MRSA*  --    URINECX   --   --  >100,000 CFU/mL Normal Urogenital Hue   BCIDPCR   --  Staphylococcus aureus. mecA (methicillin resistance gene) detected. Identification by BCID PCR.*  --        Results from last 7 days   Lab Units 11/19/20  0745 11/15/20  1327   PH, ARTERIAL pH units 7.280* 7.401   PO2 ART mm Hg 67.2* 91.0   PCO2, ARTERIAL mm Hg 57.2* 36.5   HCO3 ART mmol/L 26.9 22.6        Results Review:    I have reviewed the relevant laboratory results and independently reviewed the chest imaging from this hospitalization including the available echocardiogram reports personally and summarized it if/ when appropriate below    Assessment    Acute metabolic encephalopathy; worsening  MRSA bacteremia  Left lower lobe collapse/pneumonia  Recent fall without any injury  Urinary tract infection   Sepsis with hypotension  Severe dehydration  Severe life-threatening hypernatremia  Acute renal failure  Refeeding syndrome  Severe dysphagia s/p DHT  Hypertension  H/o anorexia  Severe PCM  Advanced dementia  DNR    PLAN:  Patient continues to require 100% nonrebreather with sats in the mid 90s.  Etiology is left lower lobe pneumonia and she is already on empiric antibiotics for the same.  Electrolytes are not being checked anymore per discussion with the daughter  She is okay with giving low-dose morphine but is not interested in pursuing other palliative measures at this point.  Palliative care is on board and helping.  Appreciate their help    Overall difficult course throughout the hospital stay essentially with daughter being very specific about her wishes with regards to her mother and having a difficult time accepting the inevitable. She unfortunately had a unpleasant experience with palliative care when going through it for her father.  Son has been notified and is coordinating with the  daughter with regards to her care.    Patient continues to remain DNR with limited treatment including current antibiotics and IV fluids and limited narcotics per daughters wishes.    Tx to [facility]. Hospice eval for HSB if daughter agrees    Tutu Schmidt MD  11/20/2020

## 2020-11-20 NOTE — PROGRESS NOTES
"   LOS: 5 days    Patient Care Team:  Josh Gross MD as PCP - General (Family Medicine)    Chief Complaint:    Chief Complaint   Patient presents with   • Altered Mental Status     Follow-up acute kidney injury and hypernatremia, the patient appears to be moribund  Subjective     Interval History:   The patient is poorly responsive    Review of Systems:   Unable to obtain    Objective     Vital Signs  Temp:  [97.3 °F (36.3 °C)-98 °F (36.7 °C)] 97.3 °F (36.3 °C)  Heart Rate:  [105-120] 118  Resp:  [24-32] 24  BP: ()/(53-88) 146/70    Flowsheet Rows      First Filed Value   Admission Height  162.6 cm (64\") Documented at 11/15/2020 1317   Admission Weight  49.9 kg (110 lb) Documented at 11/15/2020 1317          No intake/output data recorded.  I/O last 3 completed shifts:  In: 0   Out: 2600 [Urine:2600]    Intake/Output Summary (Last 24 hours) at 11/20/2020 0828  Last data filed at 11/20/2020 0444  Gross per 24 hour   Intake 0 ml   Output 600 ml   Net -600 ml       Physical Exam:  General Appearance: Obtunded, appears restless, cachectic very frail  Skin: warm and dry  HEENT: pupils round and reactive to light, nonicteric sclerae, has nonrebreather mask  Neck: supple, no JVD, trachea midline  Lungs: Bilateral rhonchi, unlabored breathing effort  Heart: RRR, normal S1 and S2, no S3, no rub  Abdomen: soft, no guarding, normoactive bowel sounds  : no palpable bladder,  Extremities: no edema, cyanosis or clubbing  Neuro: Unable to assess           Results Review:    Results from last 7 days   Lab Units 11/19/20  0359 11/18/20  0600 11/17/20  1404 11/17/20  0751  11/15/20  1311   SODIUM mmol/L 146* 146* 150* 154*   < > 172*   POTASSIUM mmol/L 3.2* 3.8 3.6 3.0*   < > 4.4   CHLORIDE mmol/L 115* 116* 121* 126*   < > 128*   CO2 mmol/L 24.7 20.1* 22.6 21.2*   < > 25.0   BUN mg/dL 8 15 23 29*   < > 81*   CREATININE mg/dL 0.37* 0.47* 0.62 0.60   < > 2.50*   CALCIUM mg/dL 6.5* 7.2* 7.5* 7.3*   < > 9.2   BILIRUBIN " mg/dL  --  0.3  --  0.3  --  0.4   ALK PHOS U/L  --  68  --  56  --  63   ALT (SGPT) U/L  --  24  --  12  --  13   AST (SGOT) U/L  --  41*  --  16  --  11   GLUCOSE mg/dL 129* 123* 146* 199*   < > 192*    < > = values in this interval not displayed.       Estimated Creatinine Clearance: 42.5 mL/min (A) (by C-G formula based on SCr of 0.37 mg/dL (L)).    Results from last 7 days   Lab Units 11/19/20  0359 11/18/20  1526 11/18/20  0600  11/17/20  0751   MAGNESIUM mg/dL 1.5*  --  1.7  --  1.7   PHOSPHORUS mg/dL 2.6 2.2* 1.3*   < > 0.9*    < > = values in this interval not displayed.       Results from last 7 days   Lab Units 11/17/20  0751   URIC ACID mg/dL 6.8*       Results from last 7 days   Lab Units 11/19/20  0949 11/18/20  0600 11/17/20  0751 11/16/20  0457 11/15/20  1311   WBC 10*3/mm3 17.28* 15.69* 18.63* 27.76* 33.98*   HEMOGLOBIN g/dL 10.7* 11.5* 10.3* 12.9 14.5   PLATELETS 10*3/mm3 141 119* 149 213 337       Results from last 7 days   Lab Units 11/20/20  0509 11/19/20  0359 11/18/20  0600 11/17/20  0751 11/16/20  0457   INR  1.42* 1.29* 1.37* 1.52* 1.52*         Imaging Results (Last 24 Hours)     Procedure Component Value Units Date/Time    XR Chest 1 View [[id number]] Collected: 11/19/20 1027     Updated: 11/19/20 1033    Narrative:      XR CHEST 1 VW-     Clinical: Respiratory failure     COMPARISON 11/15/2020 chest radiograph and chest CT     FINDINGS: Within the interim there has been development of opacity  involving the upper, mid and lower left lung zone with volume loss,  there is shift of the heart and mediastinum into the left hemithorax.  Opacity could represent collapse, consolidation and/or effusion. Low  lung volumes. No interval change in appearance of the right lung.  Visualized cardiomediastinal silhouette is satisfactory in appearance.  The remainder of examination is unremarkable.     This report was finalized on 11/19/2020 10:30 AM by Dr. Vasyl Zhang M.D.           aspirin, 81 mg,  Oral, Daily  doxepin, 25 mg, Oral, Nightly  midodrine, 20 mg, Oral, TID AC  sodium chloride, 10 mL, Intravenous, Q12H  vancomycin, 750 mg, Intravenous, Q12H      Pharmacy to dose vancomycin,         Medication Review:   Current Facility-Administered Medications   Medication Dose Route Frequency Provider Last Rate Last Admin   • aluminum-magnesium hydroxide-simethicone (MAALOX MAX) 400-400-40 MG/5ML suspension 15 mL  15 mL Oral Q6H PRN Tutu Schmidt MD       • aspirin EC tablet 81 mg  81 mg Oral Daily Tutu Schmidt MD   81 mg at 11/18/20 1057   • doxepin (SINEquan) capsule 25 mg  25 mg Oral Nightly Tutu Schmidt MD   25 mg at 11/18/20 2102   • ipratropium-albuterol (DUO-NEB) nebulizer solution 3 mL  3 mL Nebulization Q6H PRN Tutu Schmidt MD       • labetalol (NORMODYNE,TRANDATE) injection 20 mg  20 mg Intravenous Q10 Min PRN Tutu Schmidt MD       • magnesium hydroxide (MILK OF MAGNESIA) suspension 2400 mg/10mL 10 mL  10 mL Oral Daily PRN Tutu Schmidt MD   10 mL at 11/17/20 1229   • midodrine (PROAMATINE) tablet 20 mg  20 mg Oral TID AC Tutu Schmidt MD   20 mg at 11/19/20 0705   • morphine injection 1 mg  1 mg Intravenous Q1H PRN Tutu Schmidt MD       • morphine injection 2 mg  2 mg Intravenous Q1H PRN Tutu Schmidt MD   2 mg at 11/20/20 0806   • ondansetron (ZOFRAN) injection 4 mg  4 mg Intravenous Q6H PRN Tutu Schmidt MD       • Pharmacy to dose vancomycin   Does not apply Continuous PRN Lynne Weston APRN       • potassium chloride (K-DUR,KLOR-CON) ER tablet 40 mEq  40 mEq Oral PRN Hai Connors MD        Or   • potassium chloride (KLOR-CON) packet 40 mEq  40 mEq Oral PRN Hai Connors MD        Or   • potassium chloride 10 mEq in 100 mL IVPB  10 mEq Intravenous Q1H PRN Hai Connors MD       • potassium phosphate 45 mmol in sodium chloride 0.9 % 500 mL infusion  45 mmol  Intravenous PRN Hai Connors MD        Or   • potassium phosphate 30 mmol in sodium chloride 0.9 % 250 mL infusion  30 mmol Intravenous PRN Hai Connors MD        Or   • potassium phosphate 15 mmol in sodium chloride 0.9 % 100 mL infusion  15 mmol Intravenous PRN Hai Connors MD        Or   • sodium phosphates 40 mmol in sodium chloride 0.9 % 500 mL IVPB  40 mmol Intravenous PRN Hai Connors MD        Or   • sodium phosphates 20 mmol in sodium chloride 0.9 % 250 mL IVPB  20 mmol Intravenous PRN Hai Connors MD       • sennosides-docusate (PERICOLACE) 8.6-50 MG per tablet 2 tablet  2 tablet Oral Nightly PRN Tutu Schmidt MD       • sodium chloride 0.9 % flush 10 mL  10 mL Intravenous PRN Tutu Schmidt MD       • sodium chloride 0.9 % flush 10 mL  10 mL Intravenous PRN Tutu Schmidt MD       • sodium chloride 0.9 % flush 10 mL  10 mL Intravenous Q12H Tutu Schmidt MD   10 mL at 11/20/20 0806   • sodium chloride 0.9 % flush 10 mL  10 mL Intravenous PRN Tutu Schmidt MD   10 mL at 11/15/20 1749   • vancomycin 750 mg/250 mL 0.9% NS add-vantage  750 mg Intravenous Q12H Hai Connors MD   750 mg at 11/19/20 2318       Assessment/Plan   1. Nilson Resolved.  2.  Hypernatremia.  Sodium yesterday was 146, currently off IV fluid  3. Hypophosphatemia likely due to refeeding.  Phosphorus yesterday was 2.6  4. MRSA bacteremia . Leukocytosis.  Treated  5. PCM.    6.  Dementia with altered mental status no improvement despite the improvement of her electrolytes and renal function      Plan:  1.  Continue the same treatment, replete potassium if needed electrolytes still pending.  2.  Patient is moribund I will strongly recommend palliative care        Eddi Ventura MD  11/20/20  08:28 EST

## 2020-11-20 NOTE — PLAN OF CARE
Goal Outcome Evaluation:  Plan of Care Reviewed With: patient  Progress: declining  Outcome Summary: pt. actively dying; 15L non rebreather - O2 sat. slowly declining; HR IV abx continued; morphine q2h for comfort and pain; pt. is very restless and agitated throughout shift - pulling on lines and heart monitor; will continue to monitor

## 2020-11-20 NOTE — DISCHARGE SUMMARY
PHYSICIAN DISCHARGE SUMMARY                                                                          [facility]      Patient Identification:    Name: Ann Marie Blanton  Age: 87 y.o.  Sex: female  :  1933  MRN: [id number]    Admit date: 11/15/2020    Discharge date 2020    Discharged Condition: Declining     Discharge Diagnoses:    Palliative care  Acute metabolic encephalopathy; worsening  MRSA bacteremia  Left lower lobe collapse/pneumonia  Recent fall without any injury  Urinary tract infection   Sepsis with hypotension  Severe dehydration  Severe life-threatening hypernatremia  Acute renal failure  Refeeding syndrome  Severe dysphagia s/p DHT  Hypertension  H/o anorexia  Severe PCM  Advanced dementia  DNR    HPI 87-year-old elderly cachectic white female with a past medical history significant for severe dementia, hypertension who was sent by assisted living facility due to persistent altered mental status which is significantly worse than her baseline mental status after being recently evaluated in the ER the day before for fall with no obvious injury based on work-up.  Patient is unable to provide any history at this point and there is no family members available at this point to get more information.  She was noted to be hypotensive initially which responded well to IV fluid hydration.  She was noted to have several life-threatening electrolyte abnormalities and hence we have been asked to evaluate the patient and admit to the ICU.    Consults:   Patient Care Team:  Josh Gross MD as PCP - General (Family Medicine)    Procedures Performed:         Hospital Course: Patient was admitted to the ICU with acute metabolic encephalopathy with severe life-threatening hypernatremia and severe dehydration in the setting of acute renal failure.  Patient was also thought to have urinary tract infection and was given  antibiotics.  She did not make much of a progress with regards to her encephalopathy in spite of correction of electrolytes.  She was noted to have bacteremia and started on vancomycin for MRSA along with suspected aspiration pneumonia of the left lower lobe.  Overall patient not make any progress and had persistent issues with pain control and had to be restrained to start tube feeds and aggressive nutrition with complications relating to refeeding syndrome at which point we had a long discussion with the patient's daughter several days and eventually she agreed to switch her goals of care but currently would like to continue with some medications and limits pain medications as she reportedly had a bad experience with her dad going through palliative care.     Patient's son has been notified as well and they both are aware that she will be discharged and readmitted to the hospital scattered bed while getting antibiotics and limited pain medications.      Objective:   Temp:  [97.3 °F (36.3 °C)-97.6 °F (36.4 °C)] 97.3 °F (36.3 °C)  Heart Rate:  [105-118] 118  Resp:  [24-28] 24  BP: ()/(70-88) 146/70   SpO2:  [100 %] 100 %  on  Flow (L/min):  [15] 15 Device (Oxygen Therapy): nonrebreather mask    Intake/Output Summary (Last 24 hours) at 11/20/2020 1617  Last data filed at 11/20/2020 1240  Gross per 24 hour   Intake 0 ml   Output 850 ml   Net -850 ml     Body mass index is 20.6 kg/m².      11/17/20  0432 11/18/20  0455 11/19/20  0558   Weight: 53.3 kg (117 lb 8.1 oz) 53.7 kg (118 lb 6.2 oz) 54.4 kg (120 lb)     Weight change:       Physical Exam:  Constitutional:  Elderly cachectic white female pt in bed, appears in respiratory distress and is having obvious accessory muscle use  Head: - NCAT  Eyes: No pallor, Anicteric conjunctiva, EOMI.  ENMT:  Mallampati 2, no oral thrush.   Diffuse upper airway sounds Dry MM.   NECK: Trachea midline, No thyromegaly, no palpable cervical LNpathy  Heart: RRR, no murmur. No  pedal edema   Lungs: LUDIVINA +, tachypnea, minimal breath sounds at the left base.  Bilateral rhonchi.  No wheezes/ crackles heard    Abdomen: Soft.  Scaphoid no tenderness, guarding or rigidity. No palpable masses  Extremities: Extremities warm and well perfused. No cyanosis/ clubbing  Neuro: Conscious, not responding to any stimuli, no gross focal neuro deficits  Psych: Mood and affect -anxious and uncomfortable      Significant Discharge Diagnostics     Pertinent Lab Results:  Results from last 7 days   Lab Units 11/19/20  0359 11/18/20  0600 11/17/20  1404 11/17/20  0751 11/16/20  2106 11/16/20  0851 11/15/20  1311   SODIUM mmol/L 146* 146* 150* 154* 160* 162* 172*   POTASSIUM mmol/L 3.2* 3.8 3.6 3.0* 3.2* 2.7* 4.4   CHLORIDE mmol/L 115* 116* 121* 126* 131* 129* 128*   CO2 mmol/L 24.7 20.1* 22.6 21.2* 21.3* 22.9 25.0   BUN mg/dL 8 15 23 29* 39* 54* 81*   CREATININE mg/dL 0.37* 0.47* 0.62 0.60 0.77 1.03* 2.50*   GLUCOSE mg/dL 129* 123* 146* 199* 132* 88 192*   CALCIUM mg/dL 6.5* 7.2* 7.5* 7.3* 7.4* 7.6* 9.2   AST (SGOT) U/L  --  41*  --  16  --   --  11   ALT (SGPT) U/L  --  24  --  12  --   --  13     Results from last 7 days   Lab Units 11/15/20  1311   CK TOTAL U/L 23   TROPONIN T ng/mL 0.080*     Results from last 7 days   Lab Units 11/19/20  0949 11/18/20  0600 11/17/20  0751 11/16/20  0457  11/15/20  1311   WBC 10*3/mm3 17.28* 15.69* 18.63* 27.76*  --  33.98*   HEMOGLOBIN g/dL 10.7* 11.5* 10.3* 12.9  --  14.5   HEMATOCRIT % 32.0* 35.2 31.2* 38.5  --  44.1   PLATELETS 10*3/mm3 141 119* 149 213  --  337   MCV fL 79.2 80.2 77.8* 78.7*  --  81.2   MCH pg 26.5* 26.2* 25.7* 26.4*  --  26.7   MCHC g/dL 33.4 32.7 33.0 33.5  --  32.9   RDW % 14.4 14.0 14.1 14.3  --  14.4   RDW-SD fl 41.2 40.0 38.7 39.2  --  41.2   MPV fL 11.5 11.0 10.4 10.7  --  10.1   NEUTROPHIL % % 85.6* 83.5* 87.0* 90.3*   < >  --    LYMPHOCYTE % % 3.9* 6.1* 4.6* 3.4*   < >  --    MONOCYTES % % 7.0 5.9 5.5 4.4*   < >  --    EOSINOPHIL % % 1.7 2.7 1.1  0.0*   < >  --    BASOPHIL % % 0.4 0.4 0.4 0.4   < >  --    IMM GRAN % %  --   --  1.4* 1.5*   < >  --    NEUTROS ABS 10*3/mm3 14.79* 13.09* 16.20* 25.04*   < > 31.60*   LYMPHS ABS 10*3/mm3 0.67* 0.96 0.86 0.95   < >  --    MONOS ABS 10*3/mm3 1.21* 0.93* 1.03* 1.23*   < >  --    EOS ABS 10*3/mm3 0.29 0.43* 0.20 0.01   < >  --    BASOS ABS 10*3/mm3 0.07 0.06 0.07 0.12   < >  --    IMMATURE GRANS (ABS) 10*3/mm3  --   --  0.27* 0.41*   < >  --    NRBC /100 WBC  --   --  0.1 0.1   < >  --     < > = values in this interval not displayed.     Results from last 7 days   Lab Units 11/20/20  0509 11/19/20  0359 11/18/20  0600 11/17/20  0751 11/16/20  0457   INR  1.42* 1.29* 1.37* 1.52* 1.52*     Results from last 7 days   Lab Units 11/19/20  0359 11/18/20  0600 11/17/20  0751 11/16/20  0457 11/15/20  1311   MAGNESIUM mg/dL 1.5* 1.7 1.7 2.6* 2.8*         Results from last 7 days   Lab Units 11/15/20  1311   TSH uIU/mL 1.690     Results from last 7 days   Lab Units 11/19/20  0745 11/15/20  1327   PH, ARTERIAL pH units 7.280* 7.401   PO2 ART mm Hg 67.2* 91.0   PCO2, ARTERIAL mm Hg 57.2* 36.5   HCO3 ART mmol/L 26.9 22.6     Results from last 7 days   Lab Units 11/15/20  1712 11/15/20  1325 11/15/20  1311   PROCALCITONIN ng/mL  --   --  0.34*   LACTATE mmol/L 2.0 3.9*  --      Results from last 7 days   Lab Units 11/15/20  1311   LIPASE U/L 38     Results from last 7 days   Lab Units 11/15/20  1451 11/15/20  1436 11/15/20  1347   BLOODCX  No growth at 5 days Staphylococcus aureus, MRSA*  --    URINECX   --   --  >100,000 CFU/mL Normal Urogenital Hue   BCIDPCR   --  Staphylococcus aureus. mecA (methicillin resistance gene) detected. Identification by BCID PCR.*  --      Results from last 7 days   Lab Units 11/15/20  1347   NITRITE UA  Positive*   WBC UA /HPF Too Numerous to Count*   BACTERIA UA /HPF 4+*   SQUAM EPITHEL UA /HPF Unable to determine due to loaded field*   URINECX  >100,000 CFU/mL Normal Urogenital Hue     Results  from last 7 days   Lab Units 11/15/20  1315   ADENOVIRUS DETECTION BY PCR  Not Detected   CORONAVIRUS 229E  Not Detected   CORONAVIRUS HKU1  Not Detected   CORONAVIRUS NL63  Not Detected   CORONAVIRUS OC43  Not Detected   HUMAN METAPNEUMOVIRUS  Not Detected   HUMAN RHINOVIRUS/ENTEROVIRUS  Not Detected   INFLUENZA B PCR  Not Detected   PARAINFLUENZA 1  Not Detected   PARAINFLUENZA VIRUS 2  Not Detected   PARAINFLUENZA VIRUS 3  Not Detected   PARAINFLUENZA VIRUS 4  Not Detected   BORDETELLA PERTUSSIS PCR  Not Detected   CHLAMYDOPHILA PNEUMONIAE PCR  Not Detected   MYCOPLAMA PNEUMO PCR  Not Detected   INFLUENZA A PCR  Not Detected   RSV, PCR  Not Detected       Imaging Results:  Imaging Results (All)     Procedure Component Value Units Date/Time    XR Chest 1 View [[id number]] Collected: 11/19/20 1027     Updated: 11/19/20 1033    Narrative:      XR CHEST 1 VW-     Clinical: Respiratory failure     COMPARISON 11/15/2020 chest radiograph and chest CT     FINDINGS: Within the interim there has been development of opacity  involving the upper, mid and lower left lung zone with volume loss,  there is shift of the heart and mediastinum into the left hemithorax.  Opacity could represent collapse, consolidation and/or effusion. Low  lung volumes. No interval change in appearance of the right lung.  Visualized cardiomediastinal silhouette is satisfactory in appearance.  The remainder of examination is unremarkable.     This report was finalized on 11/19/2020 10:30 AM by Dr. Vasyl Zhang M.D.       CT Head Without Contrast [[id number]] Collected: 11/15/20 1526     Updated: 11/15/20 1630    Narrative:      CT SCAN OF THE BRAIN WITHOUT CONTRAST     HISTORY: Hypotension. Fell.     The CT scan was performed as an emergency procedure through the brain  without contrast. There is prominent diffuse atrophy and chronic small  vessel ischemic change similar to yesterday's exam. There is no evidence  of acute intracranial hemorrhage or  mass effect and the visualized  sinuses and mastoid air cells are clear.     Radiation dose reduction techniques were utilized, including automated  exposure control and exposure modulation based on body size.     This report was finalized on 11/15/2020 4:27 PM by Dr. Luis Felipe Lemus M.D.       CT Abdomen Pelvis Without Contrast [[id number]] Collected: 11/15/20 1604     Updated: 11/15/20 1630    Narrative:      CT SCANS OF THE CHEST AND ABDOMEN AND PELVIS WITHOUT CONTRAST     HISTORY: Recent falls. Vaguely nodular densities in the left chest noted  on recent portable chest x-ray. Chest and abdominal pain.     The CT scans of the chest and abdomen and pelvis were performed as an  emergency procedure without contrast and compared to previous CT scans  of the abdomen and pelvis dated 04/23/2018. The following findings are  present:  1. In the chest, there are several very small areas of somewhat  peripheral ground glass opacity in the left lung. These are nonspecific  but could relate to COVID-19 pneumonia and further clinical correlation  is recommended. One of the areas has a more nodular component measuring  up to 6 or 7 mm as seen on image 28 but likely corresponds to the same  process.  2. There is no mediastinal or hilar or axillary adenopathy. There is a  tiny pericardial effusion.  3. There are several hepatic cysts measuring up to 3.1 cm unchanged from  04/23/2018. The spleen, pancreas, both adrenal glands, and both kidneys  are unremarkable and unchanged.  4. There is no aortic aneurysm or retroperitoneal lymphadenopathy. The  large and small bowel loops are normal in caliber. There is a moderate  amount of dense stool in the colon including dense stool within the  rectal ampulla that measures up to 6.7 cm and raising the concern of an  element of mild fecal impaction. The remainder of the pelvis is  unremarkable.  5. At bone windows, there is no evidence of acute compression fracture  in the thoracic or  lumbar spine. No rib fracture is seen.                 Radiation dose reduction techniques were utilized, including automated  exposure control and exposure modulation based on body size.     This report was finalized on 11/15/2020 4:27 PM by Dr. Luis Felipe Lemus M.D.       CT Chest Without Contrast [[id number]] Collected: 11/15/20 1604     Updated: 11/15/20 1630    Narrative:      CT SCANS OF THE CHEST AND ABDOMEN AND PELVIS WITHOUT CONTRAST     HISTORY: Recent falls. Vaguely nodular densities in the left chest noted  on recent portable chest x-ray. Chest and abdominal pain.     The CT scans of the chest and abdomen and pelvis were performed as an  emergency procedure without contrast and compared to previous CT scans  of the abdomen and pelvis dated 04/23/2018. The following findings are  present:  1. In the chest, there are several very small areas of somewhat  peripheral ground glass opacity in the left lung. These are nonspecific  but could relate to COVID-19 pneumonia and further clinical correlation  is recommended. One of the areas has a more nodular component measuring  up to 6 or 7 mm as seen on image 28 but likely corresponds to the same  process.  2. There is no mediastinal or hilar or axillary adenopathy. There is a  tiny pericardial effusion.  3. There are several hepatic cysts measuring up to 3.1 cm unchanged from  04/23/2018. The spleen, pancreas, both adrenal glands, and both kidneys  are unremarkable and unchanged.  4. There is no aortic aneurysm or retroperitoneal lymphadenopathy. The  large and small bowel loops are normal in caliber. There is a moderate  amount of dense stool in the colon including dense stool within the  rectal ampulla that measures up to 6.7 cm and raising the concern of an  element of mild fecal impaction. The remainder of the pelvis is  unremarkable.  5. At bone windows, there is no evidence of acute compression fracture  in the thoracic or lumbar spine. No rib fracture is  seen.                 Radiation dose reduction techniques were utilized, including automated  exposure control and exposure modulation based on body size.     This report was finalized on 11/15/2020 4:27 PM by Dr. Luis Felipe Lemus M.D.       XR Hip With or Without Pelvis 2 - 3 View Left [[id number]] Collected: 11/15/20 1413     Updated: 11/15/20 1419    Narrative:      PROCEDURE:  XR HIP W OR WO PELVIS 2-3 VIEW LEFT-     HISTORY: Left hip pain after fall.     COMPARISON: None.     FINDINGS: 5 views of the pelvis and left hip were obtained. There is  degenerative disc disease in the lower lumbar spine. There is diffuse  osseous demineralization, which limits evaluation of fine osseous  detail. Within these limitations, no acute fracture is identified. There  is moderate left and mild hip osteoarthritis with joint space narrowing,  subchondral sclerosis, and marginal osteophytes. There is no  dislocation. There is calcific atherosclerosis. If there is persistent  clinical concern for acute fracture or inability to bear weight, further  evaluation with cross-sectional imaging would be warranted.     This report was finalized on 11/15/2020 2:16 PM by Dr. Dahlia Blackburn M.D.       XR Chest 1 View [[id number]] Collected: 11/15/20 1410     Updated: 11/15/20 1416    Narrative:      XR CHEST 1 VW-     HISTORY:  Altered mental status.     COMPARISON:  Chest radiograph 04/03/2016.     FINDINGS:    2 views of the chest were obtained. The cardiac silhouette and  mediastinal and hilar contours are within normal limits. There is  calcific aortic atherosclerosis. There are nodular opacities projecting  over the mid to upper left lung and lateral left lung base, not clearly  present on prior radiographs from 2016. Recommend further evaluation  with CT chest. There is no focal consolidation. Pleural spaces are  clear. There is osseous demineralization. There is multilevel  degenerative disc disease. There is mild asymmetric elevation of  the  left hemidiaphragm, which is new.     This report was finalized on 11/15/2020 2:13 PM by Dr. Dahlia Blackburn M.D.             Discharge Medications and Instructions:     Discharge Medications     Discharge Medications      ASK your doctor about these medications      Instructions Start Date   acetaminophen 325 MG tablet  Commonly known as: TYLENOL   650 mg, Oral, Every 4 Hours PRN      aspirin 81 MG EC tablet   81 mg, Oral, Daily      clonazePAM 0.5 MG tablet  Commonly known as: KlonoPIN   0.5 mg, Oral, 2 Times Daily PRN      doxepin 25 MG capsule  Commonly known as: SINEquan   25 mg, Oral, Nightly      megestrol 40 MG/ML suspension  Commonly known as: MEGACE   Oral, Daily      potassium chloride 10 MEQ CR capsule  Commonly known as: MICRO-K   10 mEq, Oral, Daily      vitamin B-12 1000 MCG tablet  Commonly known as: CYANOCOBALAMIN   1,000 mcg, Oral, Daily             Disposition:  HSB        Total time spent discharging patient including evaluation, medication reconciliation, arranging follow up, and post hospitalization instructions and education total time exceeds 30 minutes.    Signed:  Tutu Schmidt MD  11/20/2020  16:17 EST

## 2020-11-20 NOTE — PROGRESS NOTES
[facility]  Clinical Pharmacy Department     Vancomycin Pharmacokinetic Note    Ann Marie Blanton is a 87 y.o. female who is on day 1 of pharmacy to dose vancomycin for MRSA bacteremia.    Target level: AUC24 400-600  Consulting Provider:  Lynne Weston  Current Vancomycin Dose:  Vancomycin 750 mg Q12h  Planned Duration of Therapy: TBD  Other Antimicrobials: None    Allergies  Allergies as of 11/15/2020 - Reviewed 11/15/2020   Allergen Reaction Noted   • Sulfa antibiotics Other (See Comments) 10/28/2015       Microbiology:  Microbiology Results (last 10 days)     Procedure Component Value - Date/Time    Blood Culture - Blood, Arm, Left [[id number]] Collected: 11/15/20 1451    Lab Status: Preliminary result Specimen: Blood from Arm, Left Updated: 11/19/20 1530     Blood Culture No growth at 4 days    Blood Culture - Blood, Hand, Left [[id number]]  (Abnormal)  (Susceptibility) Collected: 11/15/20 1436    Lab Status: Final result Specimen: Blood from Hand, Left Updated: 11/20/20 0604     Blood Culture Staphylococcus aureus, MRSA     Comment: Infectious disease consultation is highly recommended to rule out distant foci of infection.  Methicillin resistant Staphylococcus aureus, Patient may be an isolation risk.        Gram Stain Aerobic Bottle Gram positive cocci in clusters    Susceptibility      Staphylococcus aureus, MRSA     JENNIFER     Gentamicin Susceptible     Oxacillin Resistant     Rifampin Susceptible     Vancomycin Susceptible                Susceptibility Comments     Staphylococcus aureus, MRSA    This isolate does not demonstrate inducible clindamycin resistance in vitro.               Blood Culture ID, PCR - Blood, Hand, Left [[id number]]  (Abnormal) Collected: 11/15/20 1436    Lab Status: Final result Specimen: Blood from Hand, Left Updated: 11/18/20 2213     BCID, PCR Staphylococcus aureus. mecA (methicillin resistance gene) detected. Identification by BCID PCR.     BOTTLE TYPE Aerobic Bottle    Urine  "Culture - Urine, Urine, Catheter [[id number]] Collected: 11/15/20 1347    Lab Status: Final result Specimen: Urine, Catheter Updated: 11/18/20 1250     Urine Culture >100,000 CFU/mL Normal Urogenital Sher    Narrative:      Specimen contains mixed organisms of questionable pathogenicity which indicates contamination with commensal sher.  Further identification is unlikely to provide clinically useful information.  Suggest recollection.    Respiratory Panel PCR w/COVID-19(SARS-CoV-2) VICENTA/AMADA/VERONIKA/PAD/COR/MAD/JOS In-House, NP Swab in UTM/VTM, 3-4 HR TAT - Swab, Nasopharynx [[id number]]  (Normal) Collected: 11/15/20 1315    Lab Status: Final result Specimen: Swab from Nasopharynx Updated: 11/15/20 1458     ADENOVIRUS, PCR Not Detected     Coronavirus 229E Not Detected     Coronavirus HKU1 Not Detected     Coronavirus NL63 Not Detected     Coronavirus OC43 Not Detected     COVID19 Not Detected     Human Metapneumovirus Not Detected     Human Rhinovirus/Enterovirus Not Detected     Influenza A PCR Not Detected     Influenza B PCR Not Detected     Parainfluenza Virus 1 Not Detected     Parainfluenza Virus 2 Not Detected     Parainfluenza Virus 3 Not Detected     Parainfluenza Virus 4 Not Detected     RSV, PCR Not Detected     Bordetella pertussis pcr Not Detected     Bordetella parapertussis PCR Not Detected     Chlamydophila pneumoniae PCR Not Detected     Mycoplasma pneumo by PCR Not Detected    Narrative:      Fact sheet for providers: https://docs.Noteleaf/wp-content/uploads/PQF2661-5953-NF0.1-EUA-Provider-Fact-Sheet-3.pdf    Fact sheet for patients: https://docs.Noteleaf/wp-content/uploads/KIN5754-2666-HU8.1-EUA-Patient-Fact-Sheet-1.pdf          Vitals/Labs/I&O  162.6 cm (64\")  54.4 kg (120 lb)  Body mass index is 20.6 kg/m².   Temp:  [97.3 °F (36.3 °C)-98 °F (36.7 °C)] 97.3 °F (36.3 °C)  Heart Rate:  [105-120] 118  Resp:  [24-32] 24  BP: ()/(53-88) 146/70    Results from last 7 days   Lab Units " 11/19/20  0949 11/18/20  0600 11/17/20  0751   WBC 10*3/mm3 17.28* 15.69* 18.63*     Results from last 7 days   Lab Units 11/15/20  1712 11/15/20  1325   LACTATE mmol/L 2.0 3.9*      Results from last 7 days   Lab Units 11/15/20  1311   PROCALCITONIN ng/mL 0.34*       Estimated Creatinine Clearance: 42.5 mL/min (A) (by C-G formula based on SCr of 0.37 mg/dL (L)).  Results from last 7 days   Lab Units 11/19/20  0359 11/18/20  0600 11/17/20  1404   BUN mg/dL 8 15 23   CREATININE mg/dL 0.37* 0.47* 0.62     Intake & Output (last 3 days)       11/17 0701 - 11/18 0700 11/18 0701 - 11/19 0700 11/19 0701 - 11/20 0700 11/20 0701 - 11/21 0700    P.O.   0     I.V. (mL/kg) 4092 (76.2) 2880.8 (53)      Other 929       NG/       IV Piggyback 50       Total Intake(mL/kg) 5711 (106.4) 2880.8 (53) 0 (0)     Urine (mL/kg/hr) 3300 (2.6) 3250 (2.5) 1550 (1.2)     Stool 0 0 0     Total Output 3300 3250 1550     Net +2411 -369.3 -1550             Urine Unmeasured Occurrence  1 x 1 x     Stool Unmeasured Occurrence 2 x 2 x 1 x           Vancomycin Dosing and Level History:  11/19 Vancomycin 1000 mg x 1 load @0024  11/19 Vancomycin 750 mg Q12h: 1211 2318   11/20 Trough@ = 18.5 mg/L (12.5 hr level)    Assessment/Plan:  · Trough collected at steady state returned at 18.5 mg/L with a corresponding calculated AUC of 597 mg/L*hr (Goal 400-600). While regimen is technically therapeutic it is at the upper end of goal and at risk for becoming supratherapeutic. I will reduce the regimen to vancomycin 1250 mg every 24 hours (after the evening dose of the current regimen), which is estimated to achieve a steady state trough of 13.2 mg/L and an AUC of 508 mg/L*hr.   · A trough will be collected prior to the 3rd dose of the new regimen or sooner if clinically indicated.      Bayesian analysis of the most recent level(s) using [employer] provides the following patient-specific pharmacokinetic parameters:   CL: 2.52 L/h   Vd: 41.2 L   T1/2: 12.6  "h  If the predicted trough on this regimen is not within what was previously considered a \"target trough range\", the AUC24 (a stronger predictor of vancomycin efficacy) is predicted to achieve the accepted target of 400-600 mg*h/L. To best balance efficacy and toxicity, we will be targeting AUC24 in this patient rather than steady-state trough levels.       Thank you for involving pharmacy in this patient's care. Please contact pharmacy with any questions or concerns.                           Caprice Mcknight [facility]  Clinical Pharmacist  11/20/20 09:46 EST    "

## 2020-11-20 NOTE — PROGRESS NOTES
"Discharge Planning Assessment  [facility]     Patient Name: Ann Marie Blanton  MRN: [id number]  Today's Date: 11/20/2020    Admit Date: 11/15/2020    Discharge Needs Assessment    No documentation.       Discharge Plan     Row Name 11/20/20 1619       Plan    Plan Comments  The patient transferred to [facility] from [facility] on 11/20/2020. The patient is palliative. No [facility] eval at this time. CCP will follow for any needs that may arise. EMLISSA Turner RN, [facility]    Row Name 11/20/20 1412       Plan    Plan  Transferred to [facility]. Daughter refusing Palliative Care. Patient continues to remain DNR with limited treatment including current antibiotics and IV fluids and limited narcotics per daughters wishes. Hospice eval for HSB if daughter agrees.    Patient/Family in Agreement with Plan  yes    Plan Comments  Transferred to [facility]. Pt requiring 100% NRB. Daughter does not want Palliative Care and only allowing small doses of morphine to be given. Per pulmonology note, \"11/20 Transferred to [facility]. Patient continues to remain DNR with limited treatment including current antibiotics and IV fluids and limited narcotics per daughters wishes. Hospice eval for HSB if daughter agrees.\"  Michael Curtis RN-BC        Continued Care and Services - Admitted Since 11/15/2020     Destination     Service Provider Request Status Selected Services Address Phone Fax Patient Preferred    [facility]  Pending - Request Sent N/A [id number] [facility] [id number] [phone] --                Demographic Summary    No documentation.       Functional Status    No documentation.       Psychosocial    No documentation.       Abuse/Neglect    No documentation.       Legal    No documentation.       Substance Abuse    No documentation.       Patient Forms    No documentation.           Lucy Turner RN    "

## 2020-11-20 NOTE — PROGRESS NOTES
"Continued Stay Note  [facility]     Patient Name: Ann Marie Blanton  MRN: [id number]  Today's Date: 11/20/2020    Admit Date: 11/15/2020    Discharge Plan     Row Name 11/20/20 1414       Plan    Plan  [place] [facility] Daughter refusing Palliative Care. Patient continues to remain DNR with limited treatment including current antibiotics and IV fluids and limited narcotics per daughters wishes. Hospice eval for HSB if daughter agrees.    Patient/Family in Agreement with Plan  yes    Plan Comments  [place] [facility] Pt requiring 100% NRB. Daughter does not want Palliative Care and only allowing small doses of morphine to be given. Per pulmonology note, \"11/20 [place] [facility] Patient continues to remain DNR with limited treatment including current antibiotics and IV fluids and limited narcotics per daughters wishes. Hospice eval for HSB if daughter agrees.\"  Michael Curtis RN-BC        Discharge Codes    No documentation.             Michael Curtis RN    "

## 2020-11-20 NOTE — PLAN OF CARE
Goal Outcome Evaluation:  Plan of Care Reviewed With: patient  Progress: declining  Outcome Summary: Pt new admit to unit this shift. Pt restless and agitated. Facial grimacing; moaning; movement of hands to O2 mask noted. Morphine Q2H administered for pain and discomfort. IV antibiotic and fluids infusing as ordered.   PAT LO WITH FLORINA NEXT FRIDAY

## 2020-11-20 NOTE — CONSULTS
Patient now on 4P. Spoke to daughter she is agreeable to hospice scattered bed admission for management of pain and shortness of air. She expressed that she wants to continue antibiotics, and oxygen at this time. She is currently traveling to [place] to  her son and asked me to reach out to her Brother to obtain consents for services as they share POA. Obtained consents, notified nurse Ramandeep who will reach out to Dr. Schmidt to obtain orders to discharge/readmit. Thank you for the referral and for allowing me to participate in the care of this patient.     Letty Lo, RN  [facility]  [phone]

## 2020-11-21 PROBLEM — Z51.5 HOSPICE CARE: Status: ACTIVE | Noted: 2020-01-01

## 2020-11-21 NOTE — H&P
[facility]  HISTORY AND PHYSICAL   [facility]        Patient Identification:  Name: Ann Marie Blanton  Age: 87 y.o.  Sex: female  :  1933  MRN: [id number]                     Primary Care Physician: Josh Gross MD    Chief Complaint:  Patient discharged and readmitted as hospice scatter bed for end-of-life care.    History of Present Illness:   Patient admitted with metabolic encephalopathy with MRSA bacteremia and pneumonia.  Deemed to be at end-of-life and change to palliative measures.  Patient being discharged and readmitted under hospice scatter bed for end-of-life care.  Patient is unresponsive and nonverbal.  Only moans unintelligibly.  Chart reviewed.    Past Medical History:  Past Medical History:   Diagnosis Date   • Anxiety    • Dementia (CMS/HCC)    • Depression    • Eustachian tube dysfunction    • Eye exam normal    • Grief    • Hyperlipidemia    • Hypokalemia    • Mild cognitive impairment    • Sciatica    • Seasonal allergies      Past Surgical History:  Past Surgical History:   Procedure Laterality Date   • APPENDECTOMY     • EYE SURGERY Left 2015    Dr. Saldana   • HYSTERECTOMY     • KNEE SURGERY  2010    torn meniscus   • TONSILLECTOMY        Home Meds:  Medications Prior to Admission   Medication Sig Dispense Refill Last Dose   • acetaminophen (TYLENOL) 325 MG tablet Take 2 tablets by mouth Every 4 (Four) Hours As Needed for Mild Pain .      • aspirin 81 MG EC tablet Take 81 mg by mouth Daily.      • clonazePAM (KlonoPIN) 0.5 MG tablet Take 0.5 mg by mouth 2 (Two) Times a Day As Needed.      • doxepin (SINEquan) 25 MG capsule Take 25 mg by mouth Every Night.      • megestrol (MEGACE) 40 MG/ML suspension Take  by mouth Daily.      • potassium chloride (MICRO-K) 10 MEQ CR capsule Take 10 mEq by mouth Daily.      • vitamin B-12 (CYANOCOBALAMIN) 1000 MCG tablet Take 1 tablet by mouth Daily. 90 tablet 0        Allergies:  Allergies   Allergen  Reactions   • Sulfa Antibiotics Other (See Comments)     Immunizations:  Immunization History   Administered Date(s) Administered   • PPD Test 2017   • Tdap 11/10/2020     Social History:   Social History     Social History Narrative   • Not on file     Social History     Tobacco Use   • Smoking status: Never Smoker   • Smokeless tobacco: Never Used   Substance Use Topics   • Alcohol use: No     Family History:  Family History   Problem Relation Age of Onset   • Kidney disease Mother    • Nephrolithiasis Mother    • Diabetes Mother    • Heart disease Father    • Diabetes Other    • Breast cancer Sister    • Thyroid disease Brother         Review of Systems  Unobtainable due to altered mental status    Objective:  tMax 24 hrs: Temp (24hrs), Av.5 °F (36.4 °C), Min:97.5 °F (36.4 °C), Max:97.5 °F (36.4 °C)    Vitals Ranges:   Temp:  [97.5 °F (36.4 °C)] 97.5 °F (36.4 °C)  Heart Rate:  [110-123] 110  Resp:  [18-24] 22  BP: (139)/(79) 139/79      Exam:  /79 (BP Location: Left arm, Patient Position: Lying)   Pulse 110   Temp 97.5 °F (36.4 °C) (Axillary)   Resp 22   LMP  (LMP Unknown)   SpO2 92%     GENERAL APPEARANCE:   · Unresponsive  EYES:    · PERRL                                                                           · Conjunctiva normal  · Sclera non-icteric.  HENT:   · Atraumatic, normocephalic  · External ears and nose normal  · Moist mucous membranes and no ulcers  NECK:  · Thyroid not enlarged  · Trachea midline   RESPIRATORY:    · Nonlabored breathing   · Coarse bilateral breath sounds  · No rales. No wheezing  · No dullness  CARDIOVASCULAR:    · RRR  · Normal S1, S2  · No murmur  · Lower extremity edema: none    GI:   · Bowel sounds normal  · Abdomen soft , non-distended, non-tender  · No abdominal masses.    MUSCULOSKELETAL:  · Normal movement of extremities  · No tenderness, no deformities  · No clubbing or cyanosis   Skin:    · No visible rashes  · No palpable  nodules  PSYCHIATRIC:  · Speech and behavior appropriate  · Normal mood and affect  · Oriented to person, place and time  NEUROLOGIC:   Cranial nerves II through XII grossly intact.  Sensation intact.  .     Data Review:  Labs reviewed      Imaging reviewed      Assessment:  Palliative care  Acute metabolic encephalopathy; worsening  MRSA bacteremia  Left lower lobe collapse/pneumonia  Recent fall without any injury  Urinary tract infection   Sepsis with hypotension  Severe dehydration  Severe life-threatening hypernatremia  Acute renal failure  Refeeding syndrome  Severe dysphagia s/p DHT  Hypertension  H/o anorexia  Severe PCM  Advanced dementia  DNR        Plan:  Admit for inpatient hospice scatter bed for end-of-life care.  Comfort is primary goal.  Palliative order set.  Family wants to continue antibiotics however.  Prognosis Hours to days.      Adolfo Elam MD  [facility], [facility]  Pulmonary and Critical Care Medicine    11/21/2020  14:28 EST

## 2020-11-21 NOTE — PLAN OF CARE
Goal Outcome Evaluation:  Plan of Care Reviewed With: daughter, son  Progress: declining  Outcome Summary: Spoke with son, , on phone.  Verified he is the primary POA for healthcare and emailed copy of Living will, filed in chart.  Discussed palliative care/comfort care measures with  fully in agreement with comfort care for patient including medication management and hensley catheter as needed.  Patient currently non-responsive verbally, withdrawing from pain with arms intermittently.  Morphine 2 mg IV given 2 times this shift for comfort and premedication for activity.

## 2020-11-21 NOTE — PLAN OF CARE
Goal Outcome Evaluation:  Plan of Care Reviewed With: patient  Progress: declining  Outcome Summary: Pt is dying. Pt gets extremely restless and SOA often. Medicated for comfort/SOA X4 (2mg morphine). Pt needs ativan and comfort care. Minimal Urine output (will bladder scan) see chart for results. Continue to monitor.

## 2020-11-22 NOTE — PROGRESS NOTES
Pharmacokinetic Consult - Vancomycin Dosing (Follow-up Note)    Ann Marie Blanton is on day 3 pharmacy to dose vancomycin for MRSA bacteremia.  Pharmacy dosing vancomycin per PRANEETH Bentley's request.     Current Vancomycin dose:  Vancomycin 1250 mg IV Q 24 hours     Relevant clinical data and objective history reviewed:  87 y.o. female        Past Medical History:   Diagnosis Date    Anxiety     Dementia (CMS/HCC)     Depression     Eustachian tube dysfunction     Eye exam normal 2013    Grief     Hyperlipidemia     Hypokalemia     Mild cognitive impairment     Sciatica     Seasonal allergies      Creatinine   Date Value Ref Range Status   11/21/2020 1.02 (H) 0.57 - 1.00 mg/dL Final   11/19/2020 0.37 (L) 0.57 - 1.00 mg/dL Final   11/18/2020 0.47 (L) 0.57 - 1.00 mg/dL Final     BUN   Date Value Ref Range Status   11/21/2020 27 (H) 8 - 23 mg/dL Final     Estimated Creatinine Clearance: 33.4 mL/min (A) (by C-G formula based on SCr of 1.02 mg/dL (H)).    Lab Results   Component Value Date    WBC 20.05 (H) 11/21/2020     Temp Readings from Last 3 Encounters:   11/22/20 97.4 °F (36.3 °C) (Oral)   11/20/20 97.3 °F (36.3 °C) (Oral)   11/14/20 97.8 °F (36.6 °C) (Tympanic)         Anti-Infectives (From admission, onward)      Ordered     Dose/Rate Route Frequency Start Stop    11/20/20 2058  vancomycin 1250 mg/250 mL 0.9% NS IVPB ([facility])     Ordering Provider: Tutu Schmidt MD    1,250 mg  over 75 Minutes Intravenous Every 24 Hours 11/21/20 1300 11/24/20 1259    11/20/20 2058  vancomycin 750 mg/250 mL 0.9% NS add-vantage     Ordering Provider: Tutu Schmidt MD    750 mg  over 45 Minutes Intravenous Every 12 Hours 11/20/20 2330 11/21/20 0046    11/20/20 2044  Pharmacy to dose vancomycin     Ordering Provider: Tutu Schmidt MD     Does not apply Continuous PRN 11/20/20 2044 11/23/20 2043           Lab Results   Component Value Date    VANCOTROUGH 18.50 11/20/2020        Assessment/Plan    Patient has been switched to palliative care, but patient's family wants to finish antibiotics through Monday. No Vancomycin level will be drawn. Pharmacy will continue to follow daily while on vancomycin and adjust as needed.     Cindy Toney, [facility]   Staff Clinical Pharmacist

## 2020-11-22 NOTE — PLAN OF CARE
Goal Outcome Evaluation:  Plan of Care Reviewed With: patient, daughter, grandchild(cate)  Progress: declining  Outcome Summary: Premedicated for activity with morphine and ativan.  Verbally non-responsive,  appears comfortable.  Comfort care provided per palliative guidelines.

## 2020-11-22 NOTE — PROGRESS NOTES
[facility] Visit Report    Ann Marie Blanton  [id number]  11/22/2020    Admission R/T [facility] Dx: YES      Reason for [facility] Admission: Senile degeneration of the brain      Symptom  Management: Pain control      Nursing/Medication Recommendations: Please contact [facility] at [phone] for any questions or concerns.      Psychosocial Issues and Recommendations: Provide support to patient and family      Spiritual Concerns and Recommendations: None at present      [facility] Discharge Plans:  None, patient in dying process and is not safe for transfer. Daily RN assessment required for symptom management of pain using IV medications prn. Patient is meeting criteria for GIP as a [facility] patient.      Review of Visit: Met with pt in room. No family or visitors present at bedside. Upon assessment, pt resting in bed and appears comfortable without s/s pain, SOA or restlessness. Unresponsive to voice or touch. Respirations even and unlabored. Bowel sounds hypoactive. Peripheral pulses weak but palpable. Skin color pale. Hopson catheter in place with small amount yellow urine output. In the past 24 hours, pt has received 5 doses of Ativan 1mg IVP, and 6 doses of Morphine 2mg IVP. Update provided to family over the phone. No issues or concerns at this time. Will continue to follow with daily RN visits.         Tisha Ovalles, RN  [facility] scattered [facility] nurse

## 2020-11-22 NOTE — PLAN OF CARE
Goal Outcome Evaluation:  Plan of Care Reviewed With: patient, daughter, grandchild(cate)  Progress: declining  Outcome Summary: Pt rested much better tonight. Medicated for comfort prior to Q4 turns (1mg Ativan, 2mg Morphine). Spent 30 min educating family on palliative care and the dying process. The pt's daught really wants the pt to get vitamin C (daughter thinks it will help the pt improve). Continue comfort care.

## 2020-11-22 NOTE — PROGRESS NOTES
hospitals Visit Report    Ann Marie Blanton  [id number]  11/21/2020    Admission R/T hospitals Dx: YES      Reason for hospitals Admission: Senile degeneration of the brain      Symptom  Management: Pain control      Nursing/Medication Recommendations: Please contact hospitals at [phone] for any questions or concerns.      Psychosocial Issues and Recommendations: Provide support to patient and family      Spiritual Concerns and Recommendations: None at present      hospitals Discharge Plans:  None, patient in dying process and is not safe for transfer. Daily RN assessment required for symptom management of pain using IV medications prn. Patient is meeting criteria for GIP as a hospitals scattered bed patient.      Review of Visit: Close monitoring for safety, daily RN assessment, comfort care for patient in dying process. Entered room and patient lying in bed, unresponsive, except for slight moan, color pale to ashen, nailbeds dusky. Breathing shallow with 02 sat 97% on 6L, PPS 10%. VS 98.9-112-22 no BP taken. F/C to BSD with yellow urine noted. No family present. Discussed care needs with staff RUSLAN Osborn and patient is receiving IV Morphine 2mg x 7 doses in last 24 hours and appears comfortable. Will continue to see daily to assess needs, monitor status and offer support.        Radha Way RN  hospitals scattered bed nurse

## 2020-11-22 NOTE — PROGRESS NOTES
Case Management Discharge Note      Final Note: Patient admitted to Hospice scattered bed    Provided Post Acute Provider List?: N/A  Provided Post Acute Provider Quality & Resource List?: N/A    Selected Continued Care - Discharged on 11/20/2020 Admission date: 11/15/2020 - Discharge disposition: Hospice/Medical Facility (D[facility] - [facility])    Destination    No services have been selected for the patient.              Durable Medical Equipment    No services have been selected for the patient.              Dialysis/Infusion    No services have been selected for the patient.              Home Medical Care    No services have been selected for the patient.              Therapy    No services have been selected for the patient.              Community Resources    No services have been selected for the patient.                       Final Discharge Disposition Code: 51 - hospice medical facility

## 2020-11-22 NOTE — PROGRESS NOTES
[facility] INPATIENT PROGRESS NOTE         [address]    2020      PATIENT IDENTIFICATION:  Name: Ann Marie Blanton ADMIT: 2020   : 1933  PCP: Josh Gross MD    MRN: [id number] LOS: 2 days   AGE/SEX: 87 y.o. female  ROOM: [facility]                     LOS 2    Reason for visit: End-of-life care      SUBJECTIVE:      Discussion with nursing at bedside.  Apparently hospice was never consulted but patient was discharged and readmitted as hospice scattered bed 2 days ago by my partner.  We will make sure that hospice is involved for inpatient scatter bed.  Family is interested in high-dose vitamin C.  I have no reason that this would be beneficial but if hospice feels that this is appropriate it is okay with me.      Objective   OBJECTIVE:    Vital Sign Min/Max for last 24 hours  Temp  Min: 97.4 °F (36.3 °C)  Max: 98.9 °F (37.2 °C)   BP  Min: 97/59  Max: 97/59   Pulse  Min: 110  Max: 120   Resp  Min: 20  Max: 22   SpO2  Min: 90 %  Max: 97 %   No data recorded   No data recorded                         There is no height or weight on file to calculate BMI.    Intake/Output Summary (Last 24 hours) at 2020 1322  Last data filed at 2020 0431  Gross per 24 hour   Intake --   Output 750 ml   Net -750 ml         Exam:  GEN:  No distress, appears stated age  NECK:  No adenopathy, midline trachea  LUNGS: Manage bilateral breath sounds on auscultation      Assessment     Scheduled meds:  ipratropium-albuterol, 3 mL, Nebulization, 4x Daily - RT  sodium chloride, 10 mL, Intravenous, Q12H  vancomycin, 1,250 mg, Intravenous, Q24H      IV meds:                      Pharmacy to dose vancomycin,       Data Review:  Results from last 7 days   Lab Units 20  0806 20  0359 20  0600 20  1404 20  0751   SODIUM mmol/L 149* 146* 146* 150* 154*   POTASSIUM mmol/L 3.8 3.2* 3.8 3.6 3.0*   CHLORIDE mmol/L 117* 115* 116* 121* 126*   CO2 mmol/L 25.1 24.7 20.1* 22.6  21.2*   BUN mg/dL 27* 8 15 23 29*   CREATININE mg/dL 1.02* 0.37* 0.47* 0.62 0.60   GLUCOSE mg/dL 101* 129* 123* 146* 199*   CALCIUM mg/dL 7.7* 6.5* 7.2* 7.5* 7.3*         Estimated Creatinine Clearance: 33.4 mL/min (A) (by C-G formula based on SCr of 1.02 mg/dL (H)).  Results from last 7 days   Lab Units 11/21/20  0806 11/19/20  0949 11/18/20  0600 11/17/20  0751 11/16/20  0457   WBC 10*3/mm3 20.05* 17.28* 15.69* 18.63* 27.76*   HEMOGLOBIN g/dL 9.6* 10.7* 11.5* 10.3* 12.9   PLATELETS 10*3/mm3 172 141 119* 149 213     Results from last 7 days   Lab Units 11/20/20  0509 11/19/20  0359 11/18/20  0600 11/17/20  0751 11/16/20  0457   INR  1.42* 1.29* 1.37* 1.52* 1.52*     Results from last 7 days   Lab Units 11/18/20  0600 11/17/20  0751   ALT (SGPT) U/L 24 12   AST (SGOT) U/L 41* 16     Results from last 7 days   Lab Units 11/19/20  0745 11/15/20  1327   PH, ARTERIAL pH units 7.280* 7.401   PO2 ART mm Hg 67.2* 91.0   PCO2, ARTERIAL mm Hg 57.2* 36.5   HCO3 ART mmol/L 26.9 22.6     Results from last 7 days   Lab Units 11/15/20  1712 11/15/20  1325   LACTATE mmol/L 2.0 3.9*             )        Active Hospital Problems    Diagnosis  POA   • Hospice care [Z51.5]  Not Applicable   • Altered mental status [R41.82]  Yes      Resolved Hospital Problems   No resolved problems to display.         ASSESSMENT:  Palliative care  Acute metabolic encephalopathy; worsening  MRSA bacteremia  Left lower lobe collapse/pneumonia  Recent fall without any injury  Urinary tract infection   Sepsis with hypotension  Severe dehydration  Severe life-threatening hypernatremia  Acute renal failure  Refeeding syndrome  Severe dysphagia s/p DHT  Hypertension  H/o anorexia  Severe PCM  Advanced dementia  DNR    PLAN:  No benefit of high-dose vitamin C from my standpoint.  If hospice feels differently happy to allow them to order vitamin C.  Hospice consult placed.      Adolfo Elam MD  Pulmonary and Critical Care Medicine  [facility],  [facility]  11/22/2020    13:22 EST

## 2020-11-23 NOTE — PROGRESS NOTES
[facility] SBT  Visit Report    Ann Marie Blanton  [id number]  2020    Review of Visit (Include All Collaboration- including names of hospital and family involved during admission/visit):  SBT SW and CHP completed joint I&C visit, pt shahbaz Sanjana present at bedside, [facility] staff just leaving, pt non-responsive, shahbaz feels she is comfortable; SW and CHP provided supportive active listening as Sanjana shared anecdotes and stories; pt is [employer], inactive; discussed self care,  plans in process, left card referencing grief counseling availability, as shahbaz leaving she requested and CHP provided prayer at bedside.  SBT RN visited a short time later and spoke with pt son on the phone, son aware of grief counseling availability.      Adria Negrete, [facility]

## 2020-11-23 NOTE — PROGRESS NOTES
[facility] Visit Report    Ann Marie Blanton  [id number]  11/23/2020    Admission R/T [facility] Dx: yes    Reason for [facility] Admission:Senile degeneration of the brain    Symptom  Management: pain, restlessness and SOA    Nursing/Medication Recommendations:nothing at this time    Psychosocial Issues and Recommendations:    Spiritual Concerns and Recommendations:    [facility] Discharge Plans:  Nothing at this time, patient is actively dying    Review of Visit (Include All Collaboration- including names of hospital and family involved during admission/visit):RN arrived on the unit and received report from RUSLAN Bennett. RN also reviewed Epic notes. RN arrived at bedside. Patient is alone. Patient is laying in bed. Patient is unresponsive with a PPS of 10%. Patient is using accessory muscles with each respiration. Patient's color is ashen and pale. Extremities are cold and mottled. Urine is timoteo with minimal output. Patient appears comfortable and actively dying. RN spoke with son by phone with update given.         Camilo Daily RN

## 2020-11-23 NOTE — DISCHARGE SUMMARY
[facility]  Discharge Summary    Date of Admission: 2020  Date of Death:  2020      PCP: Josh Gross MD    Principle Cause of Death:   Sepsis    Secondary Diagnoses:     Altered mental status    Hospice care  Palliative care  Acute metabolic encephalopathy; worsening  MRSA bacteremia  Left lower lobe collapse/pneumonia  Recent fall without any injury  Urinary tract infection   Sepsis with hypotension  Severe dehydration  Severe life-threatening hypernatremia  Acute renal failure  Refeeding syndrome  Severe dysphagia s/p DHT  Hypertension  H/o anorexia  Severe PCM  Advanced dementia  DNR         Presenting Problem/History of Present Illness  Altered mental status, unspecified altered mental status type [R41.82]  Hospice care [Z51.5]      Hospital Course  Patient was a 87 y.o. female who presented to [facility] for a scatter bed for comfort measures.  Patient was given comfort measures providing end-of-life comfort.  Patient subsequently       Saeid Jurado MD  20  17:41 EST

## 2020-11-23 NOTE — PLAN OF CARE
Goal Outcome Evaluation:  Plan of Care Reviewed With: patient, daughter  Progress: declining  Patient is on palliative care.  Minimally responsive this shift to pain only.  Premedicated for turn/reposition with PRN meds x3 this shift. Meds effective, meds toerated, reposition / care tolerated, patient appears to be resting comfortably.  Will continue with palliative plan of care, no new concerns, will continue to monitor.

## 2020-11-24 NOTE — PROGRESS NOTES
Case Management Discharge Note      Final Note: The patient  on 2020 @ 15:24. B. Johnny RN, CCP.         Selected Continued Care - Discharged on 2020 Admission date: 2020 - Discharge disposition:     Destination Coordination complete    Service Provider Selected Services Address Phone Fax Patient Preferred    [facility] [address], [place] [id number] [phone] --          Durable Medical Equipment    No services have been selected for the patient.              Dialysis/Infusion    No services have been selected for the patient.              Home Medical Care    No services have been selected for the patient.              Therapy    No services have been selected for the patient.              Community Resources    No services have been selected for the patient.                       Final Discharge Disposition Code: 41 -  in medical facility